# Patient Record
Sex: MALE | Race: WHITE | NOT HISPANIC OR LATINO | Employment: OTHER | URBAN - METROPOLITAN AREA
[De-identification: names, ages, dates, MRNs, and addresses within clinical notes are randomized per-mention and may not be internally consistent; named-entity substitution may affect disease eponyms.]

---

## 2018-11-13 ENCOUNTER — TELEPHONE (OUTPATIENT)
Dept: FAMILY MEDICINE | Facility: CLINIC | Age: 68
End: 2018-11-13

## 2018-11-13 ENCOUNTER — OFFICE VISIT (OUTPATIENT)
Dept: FAMILY MEDICINE | Facility: CLINIC | Age: 68
End: 2018-11-13
Payer: MEDICARE

## 2018-11-13 VITALS
HEART RATE: 88 BPM | DIASTOLIC BLOOD PRESSURE: 60 MMHG | WEIGHT: 232 LBS | RESPIRATION RATE: 18 BRPM | TEMPERATURE: 98 F | HEIGHT: 72 IN | BODY MASS INDEX: 31.42 KG/M2 | SYSTOLIC BLOOD PRESSURE: 130 MMHG

## 2018-11-13 DIAGNOSIS — F32.A DEPRESSION, UNSPECIFIED DEPRESSION TYPE: ICD-10-CM

## 2018-11-13 DIAGNOSIS — F41.9 ANXIETY: ICD-10-CM

## 2018-11-13 DIAGNOSIS — I10 ESSENTIAL HYPERTENSION: Primary | ICD-10-CM

## 2018-11-13 PROCEDURE — 99204 OFFICE O/P NEW MOD 45 MIN: CPT | Mod: S$GLB,,, | Performed by: NURSE PRACTITIONER

## 2018-11-13 RX ORDER — CLONAZEPAM 1 MG/1
1 TABLET ORAL 2 TIMES DAILY
COMMUNITY
End: 2018-11-13 | Stop reason: SDUPTHER

## 2018-11-13 RX ORDER — SERTRALINE HYDROCHLORIDE 50 MG/1
50 TABLET, FILM COATED ORAL DAILY
COMMUNITY
End: 2018-11-13 | Stop reason: SDUPTHER

## 2018-11-13 RX ORDER — SERTRALINE HYDROCHLORIDE 50 MG/1
50 TABLET, FILM COATED ORAL DAILY
Qty: 90 TABLET | Refills: 1 | Status: SHIPPED | OUTPATIENT
Start: 2018-11-13 | End: 2019-01-28

## 2018-11-13 RX ORDER — CARVEDILOL 3.12 MG/1
3.12 TABLET ORAL 2 TIMES DAILY WITH MEALS
COMMUNITY
End: 2018-12-06

## 2018-11-13 RX ORDER — LAMOTRIGINE 25 MG/1
25 TABLET ORAL 2 TIMES DAILY
COMMUNITY
End: 2020-02-19 | Stop reason: ALTCHOICE

## 2018-11-13 RX ORDER — CLONAZEPAM 1 MG/1
1 TABLET ORAL 2 TIMES DAILY
Qty: 30 TABLET | Refills: 0 | Status: SHIPPED | OUTPATIENT
Start: 2018-11-13 | End: 2019-01-28 | Stop reason: SDUPTHER

## 2018-11-13 RX ORDER — VALSARTAN 320 MG/1
320 TABLET ORAL DAILY
COMMUNITY
End: 2018-12-06

## 2018-11-13 NOTE — PROGRESS NOTES
Subjective:       Patient ID: Chris Quiroz is a 68 y.o. male.    Chief Complaint: Establish Care (Medication refills) and Hypertension    HPI New patient to me, here to establish care. States he is doing well. BP well controlled on current medications. He has history of depression and anxiety. Has been on his current medications with good control for a long time. He was living in [place] and has just moved back to the area. States he moved back because he was diagnosed with [place] allergy and was told that moving away from the area was the only way to avoid the symptoms. 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 It is common in [place] where her was living. Once he left the area all his respiratory symptoms have resolved. He states he had extensive work up done in [place] before he left. He had cardiac and pulmonary work up with labs and testing. We will get a copy of his records for review. He states he has had his colonoscopy, AAA screening and immunizations. Will review his records. He has no other concerns today. He is requesting refills. See ROS.    The following portion of the patients history was reviewed and updated as appropriate: allergies, current medications, past medical and surgical history. Past social history and problem list reviewed. Family PMH and Past social history reviewed. Tobacco, Illicit drug use reviewed.     Review of Systems   Constitutional: Negative for appetite change and fatigue.   HENT: Negative for congestion and sore throat.    Eyes: Negative for visual disturbance.   Respiratory: Negative for cough, chest tightness, shortness of breath and wheezing.    Cardiovascular: Negative for chest pain, palpitations and leg swelling.   Gastrointestinal: Negative for abdominal pain, blood in stool, diarrhea, nausea and vomiting.   Genitourinary: Negative for difficulty urinating.   Musculoskeletal: Negative for back pain and gait problem.   Neurological: Negative for headaches.    Psychiatric/Behavioral: Negative for decreased concentration, self-injury, sleep disturbance and suicidal ideas. The patient is not nervous/anxious.        Objective:     /60   Pulse 88   Temp 98.3 °F (36.8 °C) (Oral)   Resp 18   Ht 6' (1.829 m)   Wt 105.2 kg (232 lb)   BMI 31.46 kg/m²      Physical Exam  m   Constitutional: oriented to person, place, and time. well-developed and well-nourished.   HENT: normal nares, throat without erythema  Head: Normocephalic.   Eyes: Conjunctivae are normal. Pupils are equal, round, and reactive to light.   Neck: Normal range of motion. Neck supple. No tracheal deviation present. No thyromegaly present.   Cardiovascular: Normal rate, regular rhythm and normal heart sounds.    Pulmonary/Chest: Effort normal and breath sounds normal. No respiratory distress. No wheezes.   Abdominal: Soft. Bowel sounds are normal. No distension. There is no tenderness.   Musculoskeletal: Normal range of motion.gait and coordination normal   Neurological: oriented to person, place, and time.   Skin: Skin is warm and dry. No rashes or lesions  Psychiatric: Normal mood and affect.Behavior is normal. Judgment and thought content normal.   Assessment:       1. Essential hypertension    2. Depression, unspecified depression type    3. Anxiety        Plan:         Chris was seen today for establish care and hypertension.    Diagnoses and all orders for this visit:    Essential hypertension: BP well controlled. Continue current medications. Will give refills when he needs them.    Depression, unspecified depression type: continue the zoloft at current dose. I advised him that I cannot give him monthly klonopin. He will have to establish with Psychiatry for evaluation and treatment options. I will give him one refill to hold him until he can see them. I do not want him to go through withdrawals.   -     Ambulatory referral to Psychiatry    Anxiety: see above.    Other orders  -     sertraline  (ZOLOFT) 50 MG tablet; Take 1 tablet (50 mg total) by mouth once daily.  -     clonazePAM (KLONOPIN) 1 MG tablet; Take 1 tablet (1 mg total) by mouth 2 (two) times daily.  Do not take with ETOH, Sedatives or other narcotics. Do not take and drive due to potential for sedation. Do not take more than the prescribed amount.    Continue current medication  Take medications only as prescribed  Healthy diet, exercise  Adequate rest  Adequate hydration  Avoid allergens  Avoid excessive caffeine

## 2018-11-13 NOTE — PATIENT INSTRUCTIONS
[facility]  [phone]    [facility]  [address], [place] [address]  Phone: [phone]    [facility]  [address]  [place] [address]  Phone: [phone]  Fax: [phone]    [address] [place] [address]  Tel: [phone]   Fax: [phone]    [facility]  [address] [place] [address]  Telephone: [phone]    [facility] [address] [address]  Phone: [phone]  Fax: [phone]    [facility]  [address], [place] [address]  [phone]

## 2018-11-16 ENCOUNTER — PATIENT OUTREACH (OUTPATIENT)
Dept: ADMINISTRATIVE | Facility: HOSPITAL | Age: 68
End: 2018-11-16

## 2018-11-16 DIAGNOSIS — Z12.11 COLON CANCER SCREENING: ICD-10-CM

## 2018-11-16 NOTE — LETTER
November 16, 2018    Chris Quiroz  [address] [facility] [place] [address]             [facility]  [address]  [facility] [address]  Phone: [phone] Dear Mr. Quiroz:  Our records show you are due for colon cancer screening.  If you have already had your screening, or you have made an appointment for your screening, congratulations!  Youre on the road to good health. If you havent signed up for a colorectal screening please accept this invitation to be screened.      According to the American Cancer Society, colon cancer is the third most common cancer for people in the [place].  A simple screening test Fit Kit - done in your own home - can help find colon cancer at an early stage when it can be treated, even before any signs or symptoms develop.     Testing for blood in your stool (feces or bowel movement) is the first step. If you have an upcoming visit with your Primary Care Physician you can  a Fit Kit during your visit, or you can  a Fit Kit at your Primary Care Clinic prior to your visit.    If your test results are negative, you wont need testing again for another year.  If results show you need more testing, we will call you with next steps.    Regular colorectal cancer screening is one of the most powerful weapons for preventing coloncancer.    I wish you continued good health!    Sincerely,    Your [facility]

## 2018-12-06 PROBLEM — K92.2 GASTROINTESTINAL BLEEDING: Status: ACTIVE | Noted: 2018-12-06

## 2018-12-06 PROBLEM — J18.9 COMMUNITY ACQUIRED PNEUMONIA OF RIGHT LOWER LOBE OF LUNG: Status: ACTIVE | Noted: 2018-12-06

## 2018-12-06 PROBLEM — K21.9 GERD (GASTROESOPHAGEAL REFLUX DISEASE): Status: ACTIVE | Noted: 2018-12-06

## 2018-12-06 PROBLEM — K92.2 ACUTE UPPER GI BLEED: Status: ACTIVE | Noted: 2018-12-06

## 2018-12-13 ENCOUNTER — OFFICE VISIT (OUTPATIENT)
Dept: FAMILY MEDICINE | Facility: CLINIC | Age: 68
End: 2018-12-13
Payer: MEDICARE

## 2018-12-13 ENCOUNTER — HOSPITAL ENCOUNTER (OUTPATIENT)
Dept: RADIOLOGY | Facility: HOSPITAL | Age: 68
Discharge: HOME | End: 2018-12-13
Attending: NURSE PRACTITIONER
Payer: MEDICARE

## 2018-12-13 VITALS
SYSTOLIC BLOOD PRESSURE: 100 MMHG | WEIGHT: 230 LBS | DIASTOLIC BLOOD PRESSURE: 54 MMHG | TEMPERATURE: 98 F | HEART RATE: 88 BPM | BODY MASS INDEX: 31.15 KG/M2 | RESPIRATION RATE: 20 BRPM | HEIGHT: 72 IN

## 2018-12-13 DIAGNOSIS — J18.9 PNEUMONIA OF RIGHT LOWER LOBE DUE TO INFECTIOUS ORGANISM: ICD-10-CM

## 2018-12-13 DIAGNOSIS — Z09 HOSPITAL DISCHARGE FOLLOW-UP: Primary | ICD-10-CM

## 2018-12-13 DIAGNOSIS — K26.4 GASTROINTESTINAL HEMORRHAGE ASSOCIATED WITH DUODENAL ULCER: ICD-10-CM

## 2018-12-13 PROCEDURE — 99214 OFFICE O/P EST MOD 30 MIN: CPT | Mod: S$GLB,,, | Performed by: NURSE PRACTITIONER

## 2018-12-13 PROCEDURE — 71046 X-RAY EXAM CHEST 2 VIEWS: CPT | Mod: 26,,, | Performed by: RADIOLOGY

## 2018-12-13 PROCEDURE — 71046 X-RAY EXAM CHEST 2 VIEWS: CPT | Mod: TC,FY,PO

## 2018-12-13 RX ORDER — ACETAMINOPHEN 500 MG
500 TABLET ORAL EVERY 6 HOURS PRN
Status: ON HOLD | COMMUNITY
End: 2021-03-01 | Stop reason: HOSPADM

## 2018-12-13 RX ORDER — LEVOFLOXACIN 500 MG/1
500 TABLET, FILM COATED ORAL DAILY
Qty: 10 TABLET | Refills: 0 | Status: SHIPPED | OUTPATIENT
Start: 2018-12-13 | End: 2019-01-28 | Stop reason: ALTCHOICE

## 2018-12-13 RX ORDER — GLUCOSAMINE/CHONDRO SU A 500-400 MG
1 TABLET ORAL
Status: ON HOLD | COMMUNITY
End: 2023-01-01 | Stop reason: HOSPADM

## 2018-12-14 NOTE — PROGRESS NOTES
Subjective:       Patient ID: Chris Quiroz is a 68 y.o. male.    Chief Complaint: Hospital Follow Up ([facility]: Bleeding Ulcer and Pneumoina  12/6)    HPI here for hospital follow up. He was admitted for GI bleed and pneumonia. He was admitted on 12/6 and discharged on 12/8. States he had large black tarry stool during the night so went to the ER> had GI bleed about 20 years ago. States after the BM he was really dizzy and SOB. He was found on Xray to have pneumonia. He had EGD done in the hospital and was found to have gastritis, multiple gastric and duodenal ulcers. No active bleed was found. Upon discharge his H&H was 8.7 and 26.6.  He did not receive any blood transfusions. He was given IV antibiotics and send home with 3 days of levaquin. States he is still feeling some SOB and weakness. States he is better, but not back to base line yet.  He states he is eating and drinking well. He denies any fever. Has cough but that has improved. He has no other concerns today. Hospital records reviewed. See ROS.    The following portion of the patients history was reviewed and updated as appropriate: allergies, current medications, past medical and surgical history. Past social history and problem list reviewed. Family PMH and Past social history reviewed. Tobacco, Illicit drug use reviewed.     Review of Systems   Constitutional: Positive for fatigue. Negative for fever.   HENT: Negative for congestion and sore throat.    Eyes: Negative for visual disturbance.   Respiratory: Positive for cough (dry) and shortness of breath. Negative for wheezing.    Cardiovascular: Negative for chest pain, palpitations and leg swelling.   Gastrointestinal: Negative for abdominal pain, blood in stool, diarrhea, nausea and vomiting.   Musculoskeletal: Negative for back pain and gait problem.   Skin:        Pale     Neurological: Positive for weakness. Negative for dizziness and headaches.       Objective:     BP (!) 100/54   Pulse 88    Temp 98.2 °F (36.8 °C) (Oral)   Resp 20   Ht 6' (1.829 m)   Wt 104.3 kg (230 lb)   BMI 31.19 kg/m²      Physical Exam   Constitutional: oriented to person, place, and time. well-developed and well-nourished.   HENT: normal nares. Throat clear.  Head: Normocephalic.   Eyes: Conjunctivae are normal. Pupils are equal, round, and reactive to light.   Neck: Normal range of motion. Neck supple. No tracheal deviation present. No thyromegaly present.   Cardiovascular: Normal rate, regular rhythm and normal heart sounds.    Pulmonary/Chest: Effort normal   No distress. Still with some rales in the lung bases. No wheezing.   Abdominal: Soft. Bowel sounds are normal. No distension. There is no tenderness.   Musculoskeletal: Normal range of motion. Gait slow, steady.   Neurological: oriented to person, place, and time.   Skin: Skin is warm and dry. No rashes or lesions. pale  Psychiatric: Normal mood and affect.Behavior is normal. Judgment and thought content normal.   Assessment:       1. Hospital discharge follow-up    2. Pneumonia of right lower lobe due to infectious organism    3. Gastrointestinal hemorrhage associated with duodenal ulcer        Plan:         Chris was seen today for hospital follow up.    Diagnoses and all orders for this visit:    Hospital discharge follow-up: hospital records reviewed.    Pneumonia of right lower lobe due to infectious organism: repeat CXR: mild decrease in bilateral infiltrates. Will continue his antibiotics for a little longer.     -     X-Ray Chest PA And Lateral; Future    Gastrointestinal hemorrhage associated with duodenal ulcer: on medication as follow up with GI doctor. No more blood in stools. He has follow up labs scheduled per the GI doctor.     Other orders  -     levoFLOXacin (LEVAQUIN) 500 MG tablet; Take 1 tablet (500 mg total) by mouth once daily.    Continue current medication  Take medications only as prescribed  Healthy diet, exercise  Adequate rest  Adequate  hydration  Avoid allergens  Avoid excessive caffeine

## 2019-01-15 ENCOUNTER — PATIENT OUTREACH (OUTPATIENT)
Dept: ADMINISTRATIVE | Facility: HOSPITAL | Age: 69
End: 2019-01-15

## 2019-01-28 ENCOUNTER — OFFICE VISIT (OUTPATIENT)
Dept: FAMILY MEDICINE | Facility: CLINIC | Age: 69
End: 2019-01-28
Payer: MEDICARE

## 2019-01-28 VITALS
DIASTOLIC BLOOD PRESSURE: 66 MMHG | WEIGHT: 230.63 LBS | TEMPERATURE: 99 F | RESPIRATION RATE: 18 BRPM | BODY MASS INDEX: 31.24 KG/M2 | HEIGHT: 72 IN | SYSTOLIC BLOOD PRESSURE: 144 MMHG | HEART RATE: 88 BPM

## 2019-01-28 DIAGNOSIS — K26.4 GASTROINTESTINAL HEMORRHAGE ASSOCIATED WITH DUODENAL ULCER: ICD-10-CM

## 2019-01-28 DIAGNOSIS — I10 ESSENTIAL HYPERTENSION: ICD-10-CM

## 2019-01-28 DIAGNOSIS — H91.93 DECREASED HEARING OF BOTH EARS: ICD-10-CM

## 2019-01-28 DIAGNOSIS — F41.9 ANXIETY: Primary | ICD-10-CM

## 2019-01-28 DIAGNOSIS — F32.A DEPRESSION, UNSPECIFIED DEPRESSION TYPE: ICD-10-CM

## 2019-01-28 DIAGNOSIS — Z79.899 ENCOUNTER FOR LONG-TERM CURRENT USE OF MEDICATION: ICD-10-CM

## 2019-01-28 PROCEDURE — 99214 PR OFFICE/OUTPT VISIT, EST, LEVL IV, 30-39 MIN: ICD-10-PCS | Mod: S$GLB,,, | Performed by: NURSE PRACTITIONER

## 2019-01-28 PROCEDURE — 99214 OFFICE O/P EST MOD 30 MIN: CPT | Mod: S$GLB,,, | Performed by: NURSE PRACTITIONER

## 2019-01-28 RX ORDER — CLONAZEPAM 1 MG/1
1 TABLET ORAL 2 TIMES DAILY
Qty: 60 TABLET | Refills: 0 | Status: SHIPPED | OUTPATIENT
Start: 2019-01-28 | End: 2019-07-21 | Stop reason: SDUPTHER

## 2019-01-28 RX ORDER — SERTRALINE HYDROCHLORIDE 100 MG/1
100 TABLET, FILM COATED ORAL DAILY
Qty: 90 TABLET | Refills: 1 | Status: SHIPPED | OUTPATIENT
Start: 2019-01-28 | End: 2019-07-22 | Stop reason: SDUPTHER

## 2019-01-30 ENCOUNTER — LAB VISIT (OUTPATIENT)
Dept: LAB | Facility: HOSPITAL | Age: 69
End: 2019-01-30
Attending: NURSE PRACTITIONER
Payer: MEDICARE

## 2019-01-30 DIAGNOSIS — Z79.899 ENCOUNTER FOR LONG-TERM CURRENT USE OF MEDICATION: ICD-10-CM

## 2019-01-30 LAB
ALBUMIN SERPL BCP-MCNC: 3.4 G/DL
ALP SERPL-CCNC: 62 U/L
ALT SERPL W/O P-5'-P-CCNC: 11 U/L
ANION GAP SERPL CALC-SCNC: 12 MMOL/L
AST SERPL-CCNC: 25 U/L
BASOPHILS # BLD AUTO: 0.04 K/UL
BASOPHILS NFR BLD: 0.6 %
BILIRUB SERPL-MCNC: 1.1 MG/DL
BUN SERPL-MCNC: 17 MG/DL
CALCIUM SERPL-MCNC: 8.8 MG/DL
CHLORIDE SERPL-SCNC: 102 MMOL/L
CHOLEST SERPL-MCNC: 198 MG/DL
CHOLEST/HDLC SERPL: 4 {RATIO}
CO2 SERPL-SCNC: 25 MMOL/L
CREAT SERPL-MCNC: 1.1 MG/DL
DIFFERENTIAL METHOD: ABNORMAL
EOSINOPHIL # BLD AUTO: 0 K/UL
EOSINOPHIL NFR BLD: 0.4 %
ERYTHROCYTE [DISTWIDTH] IN BLOOD BY AUTOMATED COUNT: 15.7 %
EST. GFR  (AFRICAN AMERICAN): >60 ML/MIN/1.73 M^2
EST. GFR  (NON AFRICAN AMERICAN): >60 ML/MIN/1.73 M^2
GLUCOSE SERPL-MCNC: 117 MG/DL
HCT VFR BLD AUTO: 37.5 %
HDLC SERPL-MCNC: 49 MG/DL
HDLC SERPL: 24.7 %
HGB BLD-MCNC: 11.4 G/DL
IMM GRANULOCYTES # BLD AUTO: 0.03 K/UL
IMM GRANULOCYTES NFR BLD AUTO: 0.4 %
LDLC SERPL CALC-MCNC: 120.2 MG/DL
LYMPHOCYTES # BLD AUTO: 1.1 K/UL
LYMPHOCYTES NFR BLD: 15.7 %
MCH RBC QN AUTO: 25.8 PG
MCHC RBC AUTO-ENTMCNC: 30.4 G/DL
MCV RBC AUTO: 85 FL
MONOCYTES # BLD AUTO: 0.5 K/UL
MONOCYTES NFR BLD: 7.7 %
NEUTROPHILS # BLD AUTO: 5.1 K/UL
NEUTROPHILS NFR BLD: 75.2 %
NONHDLC SERPL-MCNC: 149 MG/DL
NRBC BLD-RTO: 0 /100 WBC
PLATELET # BLD AUTO: 171 K/UL
PMV BLD AUTO: 11.9 FL
POTASSIUM SERPL-SCNC: 3.7 MMOL/L
PROT SERPL-MCNC: 7 G/DL
RBC # BLD AUTO: 4.42 M/UL
SODIUM SERPL-SCNC: 139 MMOL/L
TRIGL SERPL-MCNC: 144 MG/DL
TSH SERPL DL<=0.005 MIU/L-ACNC: 1.21 UIU/ML
WBC # BLD AUTO: 6.77 K/UL

## 2019-01-30 PROCEDURE — 80053 COMPREHEN METABOLIC PANEL: CPT

## 2019-01-30 PROCEDURE — 80061 LIPID PANEL: CPT

## 2019-01-30 PROCEDURE — 86803 HEPATITIS C AB TEST: CPT

## 2019-01-30 PROCEDURE — 85025 COMPLETE CBC W/AUTO DIFF WBC: CPT

## 2019-01-30 PROCEDURE — 36415 COLL VENOUS BLD VENIPUNCTURE: CPT | Mod: PO

## 2019-01-30 PROCEDURE — 84443 ASSAY THYROID STIM HORMONE: CPT

## 2019-01-31 LAB — HCV AB SERPL QL IA: NEGATIVE

## 2019-02-01 NOTE — PROGRESS NOTES
Subjective:       Patient ID: Chris Quiroz is a 68 y.o. male.    Chief Complaint: Bleeding Ulcer (Follow up )    HPI here for follow up on anxiety, GI bleed. His stomach is feeling better since being on medication. He denies any black tarry stools.  He is due for labs. He feels he is stable on his zoloft. He takes the klonopin when he feels panicked which is only prn. He feels that his depression and anxiety are doing well on the zoloft and the lamictal. He denies thoughts of wanting to harm himself or others. See ROS.    The following portion of the patients history was reviewed and updated as appropriate: allergies, current medications, past medical and surgical history. Past social history and problem list reviewed. Family PMH and Past social history reviewed. Tobacco, Illicit drug use reviewed.     Review of Systems  Constitutional: No fatigue or fever    Respiratory:   No SOB, Wheezing, cough  Cardiovascular:   No CP, Palpitations  Gastrointestinal:   No N/V/D. No abdominal pain, weight stable. Appetite good.   Genitourinary:   No dysuria, urgency or frequency. No change in bowels. No blood in stools.   Musculoskeletal:   No joint pain  No change in gait or coordination. .  Neurological:   No dizziness. No headaches  Hematological: No abnormal bruising or bleeding    Psychiatric/Behavioral Negative for suicidal ideas.  Denies feelings of depression. No thoughts of wanting to harm self or others. anxious, worried all the time. Feels panicked at times, especially in social situations.     Objective:     BP (!) 144/66   Pulse 88   Temp 99 °F (37.2 °C) (Oral)   Resp 18   Ht 6' (1.829 m)   Wt 104.6 kg (230 lb 9.6 oz)   BMI 31.27 kg/m²      Physical Exam     Constitutional: oriented to person, place, and time. well-developed and well-nourished.   Neck: Normal range of motion. Neck supple. No tracheal deviation present. No thyromegaly present.   Cardiovascular: Normal rate, regular rhythm and normal  heart sounds.    Pulmonary/Chest: Effort normal and breath sounds normal. No respiratory distress. No wheezes.   Abdominal: Soft. Bowel sounds are normal. No distension. There is no tenderness.   Musculoskeletal: Normal range of motion. Gait and coordination normal. .   Neurological: oriented to person, place, and time.   Skin: Skin is warm and dry. No rashes or lesions  Psychiatric: Normal mood and affect.Behavior is normal. Judgment and thought content normal. he does not present as a threat to himself or others.   Assessment:       1. Anxiety    2. Encounter for long-term current use of medication    3. Decreased hearing of both ears    4. Gastrointestinal hemorrhage associated with duodenal ulcer    5. Essential hypertension    6. Depression, unspecified depression type        Plan:         Chris was seen today for bleeding ulcer.    Diagnoses and all orders for this visit:    Anxiety Continue the zoloft at 100mg, kloopin on PRN basis and the Lamictal at 25mg BID.     Encounter for long-term current use of medication  -     Lipid panel; Future  -     Hepatitis C antibody; Future  -     Comprehensive metabolic panel; Future  -     CBC auto differential; Future  -     TSH; Future    Decreased hearing of both ears: will send to Audiology for evaluation.   -     Ambulatory consult to Audiology    Gastrointestinal hemorrhage associated with duodenal ulcer: continue the protonix as prescribed.     Essential hypertension: he is not taking any BP medication at this time. Will monitor closely.     Depression, unspecified depression type: continue the zoloft.     Other orders  -     sertraline (ZOLOFT) 100 MG tablet; Take 1 tablet (100 mg total) by mouth once daily.  -     clonazePAM (KLONOPIN) 1 MG tablet; Take 1 tablet (1 mg total) by mouth 2 (two) times daily.  Do not take with ETOH, Sedatives or other narcotics. Do not take and drive due to potential for sedation. Do not take more than the prescribed  amount.    Continue current medication  Take medications only as prescribed  Healthy diet, exercise  Adequate rest  Adequate hydration  Avoid allergens  Avoid excessive caffeine

## 2019-04-02 ENCOUNTER — OFFICE VISIT (OUTPATIENT)
Dept: FAMILY MEDICINE | Facility: CLINIC | Age: 69
End: 2019-04-02
Payer: MEDICARE

## 2019-04-02 ENCOUNTER — HOSPITAL ENCOUNTER (OUTPATIENT)
Dept: RADIOLOGY | Facility: HOSPITAL | Age: 69
Discharge: HOME | End: 2019-04-02
Attending: NURSE PRACTITIONER
Payer: MEDICARE

## 2019-04-02 VITALS
OXYGEN SATURATION: 96 % | HEIGHT: 72 IN | TEMPERATURE: 98 F | SYSTOLIC BLOOD PRESSURE: 130 MMHG | RESPIRATION RATE: 18 BRPM | HEART RATE: 70 BPM | WEIGHT: 219.38 LBS | BODY MASS INDEX: 29.71 KG/M2 | DIASTOLIC BLOOD PRESSURE: 66 MMHG

## 2019-04-02 DIAGNOSIS — G89.29 CHRONIC RIGHT-SIDED LOW BACK PAIN WITH RIGHT-SIDED SCIATICA: ICD-10-CM

## 2019-04-02 DIAGNOSIS — Z13.6 SCREENING FOR AAA (ABDOMINAL AORTIC ANEURYSM): ICD-10-CM

## 2019-04-02 DIAGNOSIS — Z23 IMMUNIZATION DUE: ICD-10-CM

## 2019-04-02 DIAGNOSIS — M72.2 PLANTAR FASCIITIS OF RIGHT FOOT: ICD-10-CM

## 2019-04-02 DIAGNOSIS — M79.671 PAIN OF RIGHT HEEL: Primary | ICD-10-CM

## 2019-04-02 DIAGNOSIS — M79.671 PAIN OF RIGHT HEEL: ICD-10-CM

## 2019-04-02 DIAGNOSIS — M54.41 CHRONIC RIGHT-SIDED LOW BACK PAIN WITH RIGHT-SIDED SCIATICA: ICD-10-CM

## 2019-04-02 PROCEDURE — 72110 X-RAY EXAM L-2 SPINE 4/>VWS: CPT | Mod: TC,FY,PO

## 2019-04-02 PROCEDURE — 90670 PCV13 VACCINE IM: CPT | Mod: S$GLB,,, | Performed by: NURSE PRACTITIONER

## 2019-04-02 PROCEDURE — 73650 X-RAY EXAM OF HEEL: CPT | Mod: TC,FY,PO,RT

## 2019-04-02 PROCEDURE — G0009 ADMIN PNEUMOCOCCAL VACCINE: HCPCS | Mod: S$GLB,,, | Performed by: NURSE PRACTITIONER

## 2019-04-02 PROCEDURE — 72110 XR LUMBAR SPINE 5 VIEW WITH FLEX AND EXT: ICD-10-PCS | Mod: 26,,, | Performed by: RADIOLOGY

## 2019-04-02 PROCEDURE — 72110 X-RAY EXAM L-2 SPINE 4/>VWS: CPT | Mod: 26,,, | Performed by: RADIOLOGY

## 2019-04-02 PROCEDURE — 99214 OFFICE O/P EST MOD 30 MIN: CPT | Mod: 25,S$GLB,, | Performed by: NURSE PRACTITIONER

## 2019-04-02 PROCEDURE — 73650 XR CALCANEUS 2 VIEW RIGHT: ICD-10-PCS | Mod: 26,RT,, | Performed by: RADIOLOGY

## 2019-04-02 PROCEDURE — 90670 PNEUMOCOCCAL CONJUGATE VACCINE 13-VALENT LESS THAN 5YO & GREATER THAN: ICD-10-PCS | Mod: S$GLB,,, | Performed by: NURSE PRACTITIONER

## 2019-04-02 PROCEDURE — G0009 PNEUMOCOCCAL CONJUGATE VACCINE 13-VALENT LESS THAN 5YO & GREATER THAN: ICD-10-PCS | Mod: S$GLB,,, | Performed by: NURSE PRACTITIONER

## 2019-04-02 PROCEDURE — 99214 PR OFFICE/OUTPT VISIT, EST, LEVL IV, 30-39 MIN: ICD-10-PCS | Mod: 25,S$GLB,, | Performed by: NURSE PRACTITIONER

## 2019-04-02 PROCEDURE — 73650 X-RAY EXAM OF HEEL: CPT | Mod: 26,RT,, | Performed by: RADIOLOGY

## 2019-04-02 NOTE — PROGRESS NOTES
Subjective:       Patient ID: Chris Quiroz is a 68 y.o. male.    Chief Complaint: Leg Pain (Rt leg pain, On and off since September, constant for about one month)    HPI here with concerns regarding right leg pain, off and on since September but has gotten more constant over the past month. Pain goes from back of right heel up to buttocks. Hurts more when driving. If he walks too long it will hurt. States right leg goes out from under him. Applies ice twice a day and that helps. Taking tylenol and that helps. No recent injuries. He wants to go see physical therapy. Feels like a pulled muscle but goes all the way down the leg. Sharp and burning at times. He does not think that he is limping. He feels it sometimes goes from the lower back down the leg and sometimes from the heel upwards. See ROS.    The following portion of the patients history was reviewed and updated as appropriate: allergies, current medications, past medical and surgical history. Past social history and problem list reviewed. Family PMH and Past social history reviewed. Tobacco, Illicit drug use reviewed.     Review of Systems   Constitutional: Negative for activity change, appetite change, chills, fatigue, fever and unexpected weight change.   HENT: Negative for congestion, hearing loss, mouth sores, sneezing, trouble swallowing and voice change.    Eyes: Negative for redness and visual disturbance.   Respiratory: Negative for cough, choking, chest tightness, shortness of breath and wheezing.    Cardiovascular: Negative for chest pain, palpitations and leg swelling.   Gastrointestinal: Negative for abdominal distention, abdominal pain, blood in stool, constipation, diarrhea, nausea and vomiting.   Endocrine: Negative for cold intolerance, heat intolerance, polydipsia, polyphagia and polyuria.   Genitourinary: Negative for decreased urine volume, difficulty urinating, flank pain, frequency and urgency.   Musculoskeletal: Positive for  arthralgias, back pain and gait problem.        See HPI   Skin: Negative for pallor, rash and wound.   Allergic/Immunologic: Negative for environmental allergies and food allergies.   Neurological: Negative for dizziness, tremors, seizures, speech difficulty, weakness and headaches.   Hematological: Negative for adenopathy. Does not bruise/bleed easily.   Psychiatric/Behavioral: Negative for agitation, behavioral problems, confusion, decreased concentration, dysphoric mood, self-injury, sleep disturbance and suicidal ideas. The patient is not nervous/anxious.        Objective:     /66   Pulse 70   Temp 98 °F (36.7 °C) (Oral)   Resp 18   Ht 6' (1.829 m)   Wt 99.5 kg (219 lb 6.4 oz)   SpO2 96%   BMI 29.76 kg/m²      Physical Exam   Constitutional: He is oriented to person, place, and time. He appears well-developed and well-nourished. No distress.   HENT:   Mouth/Throat: No oropharyngeal exudate.   Eyes: Pupils are equal, round, and reactive to light. Conjunctivae are normal. Right eye exhibits no discharge. Left eye exhibits no discharge.   Neck: Normal range of motion. Neck supple. No JVD present. No thyromegaly present.   Cardiovascular: Normal rate, regular rhythm and normal heart sounds. Exam reveals no gallop.   No murmur heard.  Pulmonary/Chest: Effort normal and breath sounds normal. No respiratory distress. He has no wheezes. He has no rales. He exhibits no tenderness.   Abdominal: Soft. Bowel sounds are normal. He exhibits no distension. There is no tenderness. There is no rebound and no guarding.   Musculoskeletal: Normal range of motion. He exhibits no edema.        Back:         Right foot: There is tenderness and bony tenderness.   Lymphadenopathy:     He has no cervical adenopathy.   Neurological: He is alert and oriented to person, place, and time.   Skin: Skin is warm and dry. Capillary refill takes less than 2 seconds. No rash noted. He is not diaphoretic.   Psychiatric: He has a normal  mood and affect. His behavior is normal. Judgment and thought content normal.   Nursing note and vitals reviewed.      Assessment:       1. Pain of right heel    2. Chronic right-sided low back pain with right-sided sciatica    3. Plantar fasciitis of right foot    4. Immunization due    5. Screening for AAA (abdominal aortic aneurysm)        Plan:         Chris was seen today for leg pain.    Diagnoses and all orders for this visit:    Pain of right heel: will get xray and possibly refer to Podiatry  -     X-Ray Calcaneus 2 View Right; Future  -     Ambulatory consult to Physical Therapy    Chronic right-sided low back pain with right-sided sciatica: will get xray and refer to physical therapy.   -     X-Ray Lumbar Complete With Flex And Ext; Future  -     Ambulatory consult to Physical Therapy    Plantar fasciitis of right foot: get xray. Exercises discussed. Podiatry referral.     Immunization due  -     Pneumococcal Conjugate Vaccine (13 Valent) (IM)    Screening for AAA (abdominal aortic aneurysm): due for screening  -     -     Abdominal Aorta Evaluation (Cupid Only); Future    Continue current medication  Take medications only as prescribed  Healthy diet, exercise  Adequate rest  Adequate hydration  Avoid allergens  Avoid excessive caffeine

## 2019-04-03 DIAGNOSIS — G89.29 CHRONIC RIGHT-SIDED LOW BACK PAIN WITH RIGHT-SIDED SCIATICA: ICD-10-CM

## 2019-04-03 DIAGNOSIS — M54.41 CHRONIC RIGHT-SIDED LOW BACK PAIN WITH RIGHT-SIDED SCIATICA: ICD-10-CM

## 2019-04-03 DIAGNOSIS — M51.36 DDD (DEGENERATIVE DISC DISEASE), LUMBAR: ICD-10-CM

## 2019-04-03 DIAGNOSIS — M72.2 PLANTAR FASCIITIS, RIGHT: Primary | ICD-10-CM

## 2019-04-04 ENCOUNTER — OFFICE VISIT (OUTPATIENT)
Dept: PODIATRY | Facility: CLINIC | Age: 69
End: 2019-04-04
Payer: MEDICARE

## 2019-04-04 ENCOUNTER — CLINICAL SUPPORT (OUTPATIENT)
Dept: CARDIOLOGY | Facility: CLINIC | Age: 69
End: 2019-04-04
Attending: NURSE PRACTITIONER
Payer: MEDICARE

## 2019-04-04 VITALS
RESPIRATION RATE: 13 BRPM | SYSTOLIC BLOOD PRESSURE: 116 MMHG | DIASTOLIC BLOOD PRESSURE: 68 MMHG | HEIGHT: 72 IN | WEIGHT: 221.56 LBS | HEART RATE: 70 BPM | BODY MASS INDEX: 30.01 KG/M2

## 2019-04-04 DIAGNOSIS — M72.2 PLANTAR FASCIITIS: Primary | ICD-10-CM

## 2019-04-04 DIAGNOSIS — Q66.71 PES CAVUS OF RIGHT FOOT: ICD-10-CM

## 2019-04-04 DIAGNOSIS — M79.671 PAIN IN RIGHT FOOT: ICD-10-CM

## 2019-04-04 DIAGNOSIS — M77.31 CALCANEAL SPUR OF FOOT, RIGHT: ICD-10-CM

## 2019-04-04 DIAGNOSIS — M24.571 EQUINUS CONTRACTURE OF RIGHT ANKLE: ICD-10-CM

## 2019-04-04 DIAGNOSIS — Z13.6 SCREENING FOR AAA (ABDOMINAL AORTIC ANEURYSM): ICD-10-CM

## 2019-04-04 PROCEDURE — 93978 CV US ABDOMINAL AORTA EVALUATION (CUPID ONLY): ICD-10-PCS | Mod: 26,S$PBB,, | Performed by: INTERNAL MEDICINE

## 2019-04-04 PROCEDURE — 99999 PR PBB SHADOW E&M-EST. PATIENT-LVL IV: CPT | Mod: PBBFAC,,, | Performed by: PODIATRIST

## 2019-04-04 PROCEDURE — 99214 OFFICE O/P EST MOD 30 MIN: CPT | Mod: PBBFAC,PN,25 | Performed by: PODIATRIST

## 2019-04-04 PROCEDURE — 99999 PR PBB SHADOW E&M-EST. PATIENT-LVL IV: ICD-10-PCS | Mod: PBBFAC,,, | Performed by: PODIATRIST

## 2019-04-04 PROCEDURE — 99213 PR OFFICE/OUTPT VISIT, EST, LEVL III, 20-29 MIN: ICD-10-PCS | Mod: S$PBB,,, | Performed by: PODIATRIST

## 2019-04-04 PROCEDURE — 99213 OFFICE O/P EST LOW 20 MIN: CPT | Mod: S$PBB,,, | Performed by: PODIATRIST

## 2019-04-04 PROCEDURE — 93978 VASCULAR STUDY: CPT | Mod: PBBFAC,PO | Performed by: INTERNAL MEDICINE

## 2019-04-04 RX ORDER — SULFAMETHOXAZOLE AND TRIMETHOPRIM 800; 160 MG/1; MG/1
1 TABLET ORAL 2 TIMES DAILY
Qty: 28 TABLET | Refills: 0 | Status: SHIPPED | OUTPATIENT
Start: 2019-04-04 | End: 2019-04-04

## 2019-04-04 NOTE — PATIENT INSTRUCTIONS
- Wear supportive shoes such as sneakers with arch supports.  Look for New Balance or Mcelroy sneakers.    - Look for Superfeet or Powerstep arch supports.    - Rest/reduce ambulation to necessary activities of daily living.    - Ice heel for no more than 20 minutes/per hour.    - Elevate foot as much as possible throughout the day.    - Perform Achilles/plantar fascia stretches as much as possible throughout the day.    - Apply OTC topical arnica to affected area for pain relief and to reduce bruising/swelling.    - Notify clinic if pain worsens or fails to improve.    - Follow up in 2 weeks for heel pain.

## 2019-04-04 NOTE — LETTER
April 6, 2019      Neeru Bolivar, IGNACIO  [address] [address] [address]           [facility] [address] [facility] [address] [place] [address]  Phone: [phone]          Patient: Chris Quiroz   MR Number: [id number]   YOB: 1950   Date of Visit: 4/4/2019       Dear Neeru Bolivar:    Thank you for referring Chris Quiroz to me for evaluation. Attached you will find relevant portions of my assessment and plan of care.    If you have questions, please do not hesitate to call me. I look forward to following Chris Quiroz along with you.    Sincerely,    Yuliya Wilkins, NILE    Enclosure  CC:  No Recipients    If you would like to receive this communication electronically, please contact [email or web address] or [phone] to request more information on [employer] access.    For providers and/or their staff who would like to refer a patient to [facility], please contact us through our one-stop-shop provider referral line, [facility] [employer], at [phone].    If you feel you have received this communication in error or would no longer like to receive these types of communications, please e-mail [email or web address]

## 2019-04-06 PROBLEM — M24.571 EQUINUS CONTRACTURE OF RIGHT ANKLE: Status: ACTIVE | Noted: 2019-04-06

## 2019-04-06 PROBLEM — M72.2 PLANTAR FASCIITIS: Status: ACTIVE | Noted: 2019-04-06

## 2019-04-06 PROBLEM — M77.31 CALCANEAL SPUR OF FOOT, RIGHT: Status: ACTIVE | Noted: 2019-04-06

## 2019-04-06 PROBLEM — M79.671 PAIN IN RIGHT FOOT: Status: ACTIVE | Noted: 2019-04-06

## 2019-04-06 PROBLEM — Q66.71 PES CAVUS OF RIGHT FOOT: Status: ACTIVE | Noted: 2019-04-06

## 2019-04-06 NOTE — PROGRESS NOTES
Subjective:      Patient ID: Chris Quiroz is a 68 y.o. male.    Chief Complaint: Heel Pain (goes up to right hip, radiates up the leg )      HPI:  Chris is a 68 y.o. male who presents to the clinic complaining of heel pain in the right foot, especially with the first step in the morning. The pain is described as Aching, Throbbing, Grabbing, Tight, Sharp and Shooting. The onset of the pain was gradual and has worsened over the past several weeks. Chris rates the pain as 8/10. He denies a history of trauma. Prior treatments include rest.  He does report right hip and low back pain intermittently.  Patient denies any other pedal complaints at this time.    PCP: Neeru Bolivar NP  Date last seen:  4/2/19    Review of Systems   Constitutional: Negative for appetite change, fever, chills, fatigue and unexpected weight change.   Respiratory: Negative for cough, wheezing, and shortness of breath.   Cardiovascular: Negative for chest pain, claudication, cyanosis, and leg swelling.  Endocrine:  Negative for intolerance to cold, intolerance to heat, polydipsia, polyphagia, and polyuria.    Gastrointestinal: Negative for nausea, vomiting, diarrhea, and constipation.   Musculoskeletal: Positive for back pain, arthritis, joint pain, joint swelling, myalgias, and stiffness.   Skin: Negative for nail bed changes, discoloration, rash, itching, poor wound healing, suspicious lesion, and unusual hair distribution.   Neurological: Negative for loss of balance, sensory change, paresthesias, and numbness. Positive for pain.  Hematological: Negative for adenopathy, bleeding, and bruising easily.   Psychiatric/Behavioral: The patient is not nervous/anxious.  Negative for altered mental status.    No results found for: HGBA1C    Past Medical History:   Diagnosis Date    Anxiety     Atherosclerosis of coronary artery     per CT scan dated 7/13/18    Depression     Diverticulosis of intestine without bleeding     GERD  (gastroesophageal reflux disease)     Hyperlipidemia     Hypertension     Thoracic aorta atherosclerosis     per CT dated 7/13/18. sent for scanning    Vitamin D deficiency      Past Surgical History:   Procedure Laterality Date    COLONOSCOPY      EGD (ESOPHAGOGASTRODUODENOSCOPY) Left 12/7/2018    Performed by Josiah Cuadra [facility] at [facility]     Family History   Problem Relation Age of Onset    Diabetes Mother     Depression Mother     Alcohol abuse Father     Cancer Father     Depression Father     Diabetes Sister     Cancer Sister     Cancer Brother     Mental illness Sister     Cancer Sister     Arthritis Sister     Cancer Sister      Social History     Socioeconomic History    Marital status:      Spouse name: Not on file    Number of children: Not on file    Years of education: Not on file    Highest education level: Not on file   Occupational History    Not on file   Social Needs    Financial resource strain: Not on file    Food insecurity:     Worry: Not on file     Inability: Not on file    Transportation needs:     Medical: Not on file     Non-medical: Not on file   Tobacco Use    Smoking status: Former Smoker    Smokeless tobacco: Never Used   Substance and Sexual Activity    Alcohol use: Yes     Comment: once in a while    Drug use: No    Sexual activity: Never   Lifestyle    Physical activity:     Days per week: Not on file     Minutes per session: Not on file    Stress: Not on file   Relationships    Social connections:     Talks on phone: Not on file     Gets together: Not on file     Attends Judaism service: Not on file     Active member of club or organization: Not on file     Attends meetings of clubs or organizations: Not on file     Relationship status: Not on file   Other Topics Concern    Not on file   Social History Narrative    Not on file           Objective:        /68   Pulse 70   Resp 13   Ht 6' (1.829 m)   Wt 100.5 kg (221 lb 9  oz)   BMI 30.05 kg/m²     Physical Exam   Constitutional: Patient is oriented to person, place, and time. Patient appears well-developed and well-nourished. No acute distress.     Psychiatric: Patient has a normal mood and affect. Patient's speech is normal and behavior is normal. Judgment is normal. Cognition and memory are normal.     Right pedal exam was performed today.  Vascular: Pedal pulses palpable 1/4 DP & PT.  CFT is = 3 seconds to the hallux.  Skin temperature is warm to cool proximal tibia to distal toes without localized increase in calor noted.  Localized erythema and edema without ecchymosis noted to the plantar medial arch at the insertion of the medial band of the plantar fascia on the calcaneus.  Hair growth present distally to the LE.     Musculoskeletal: Ankle joint ROM is decreased. Subtalar joint ROM is decreased.  Midtarsal joint ROM is decreased.  1st ray ROM is within normal limits.  1st  MTPJ ROM is decreased.  Ankle joint dorsiflexion is restricted with the knee extended and flexed per Silfverskiold exam.    Muscle strength is 5/5 for all LE muscle groups tested.    Neurological: Epicritic sensation is grossly Intact to the foot.   Achilles DTR and Chaddock STR are Intact to right lower extremity.   Negative Babinski sign right lower extremity.  Negative clonus sign right lower extremity.    Dermatological: Toenails 1-5 right are WNL in length and thickness.  Webspaces 1-4 right are clean, dry, and intact.  Skin turgor is supple.  No dry, flaky skin noted to the LE.  No open wounds or suspicious pigmented lesions appreciable to the foot or ankle.    Nursing note and vitals reviewed.        Assessment:       Encounter Diagnoses   Name Primary?    Plantar fasciitis Yes    Equinus contracture of right ankle     Calcaneal spur of foot, right     Pes cavus of right foot     Pain in right foot          Plan:       I counseled the patient on his conditions, their implications and medical  management.    - Reviewed with patient xrays from 4/2/19.    - Educated patient on proper supportive, accommodative shoe gear.    - Educated patient on PRICE therapy and Achilles/plantar fascial stretches with demonstration of stretches and stretching technique handout given to patient.    Patient was given the following recommendations and instructions:  Patient Instructions   - Wear supportive shoes such as sneakers with arch supports.  Look for New Balance or Mcelroy sneakers.    - Look for Superfeet or Powerstep arch supports.    - Rest/reduce ambulation to necessary activities of daily living.    - Ice heel for no more than 20 minutes/per hour.    - Elevate foot as much as possible throughout the day.    - Perform Achilles/plantar fascia stretches as much as possible throughout the day.    - Apply OTC topical arnica to affected area for pain relief and to reduce bruising/swelling.    - Notify clinic if pain worsens or fails to improve.    - Follow up in 2 weeks for heel pain.          Yuliya Wilkins DPM        Dictation was performed using M*Modal Fluency.  Transcription errors may be present.

## 2019-04-08 LAB
ABDOMINAL IMA AP: 1.7 CM
ABDOMINAL IMA ED VEL: 0 CM/S
ABDOMINAL IMA PS VEL: 89 CM/S
ABDOMINAL IMA TRANS: 1.7 CM
ABDOMINAL INFRARENAL AORTA AP: 1.7 CM
ABDOMINAL INFRARENAL AORTA ED VEL: 0 CM/S
ABDOMINAL INFRARENAL AORTA PS VEL: 81 CM/S
ABDOMINAL INFRARENAL AORTA TRANS: 1.7 CM
ABDOMINAL JUXTARENAL AORTA AP: 1.9 CM
ABDOMINAL JUXTARENAL AORTA ED VEL: 15 CM/S
ABDOMINAL JUXTARENAL AORTA PS VEL: 75 CM/S
ABDOMINAL JUXTARENAL AORTA TRANS: 1.9 CM
ABDOMINAL LT COM ILIAC AP: 1.1 CM
ABDOMINAL LT COM ILIAC TRANS: 1.1 CM
ABDOMINAL LT COM ILIAC VEL: 124 CM/S
ABDOMINAL LT COM ILLIAC ED VEL: 0 CM/S
ABDOMINAL RT COM ILIAC AP: 1.1 CM
ABDOMINAL RT COM ILIAC TRANS: 1.5 CM
ABDOMINAL RT COM ILIAC VEL: 102 CM/S
ABDOMINAL RT COM ILLIAC ED VEL: 0 CM/S
ABDOMINAL SUPRARENAL AORTA AP: 2.1 CM
ABDOMINAL SUPRARENAL AORTA ED VEL: 14 CM/S
ABDOMINAL SUPRARENAL AORTA PS VEL: 77 CM/S
ABDOMINAL SUPRARENAL AORTA TRANS: 2.1 CM

## 2019-04-08 RX ORDER — ROSUVASTATIN CALCIUM 10 MG/1
10 TABLET, COATED ORAL
Qty: 36 TABLET | Refills: 3 | Status: SHIPPED | OUTPATIENT
Start: 2019-04-08 | End: 2020-12-11 | Stop reason: SINTOL

## 2019-04-15 ENCOUNTER — OFFICE VISIT (OUTPATIENT)
Dept: SPINE | Facility: CLINIC | Age: 69
End: 2019-04-15
Payer: MEDICARE

## 2019-04-15 ENCOUNTER — PATIENT OUTREACH (OUTPATIENT)
Dept: ADMINISTRATIVE | Facility: HOSPITAL | Age: 69
End: 2019-04-15

## 2019-04-15 VITALS
WEIGHT: 221.56 LBS | HEART RATE: 83 BPM | SYSTOLIC BLOOD PRESSURE: 127 MMHG | BODY MASS INDEX: 30.01 KG/M2 | HEIGHT: 72 IN | DIASTOLIC BLOOD PRESSURE: 81 MMHG

## 2019-04-15 DIAGNOSIS — M43.16 SPONDYLOLISTHESIS OF LUMBAR REGION: ICD-10-CM

## 2019-04-15 DIAGNOSIS — M54.41 CHRONIC RIGHT-SIDED LOW BACK PAIN WITH RIGHT-SIDED SCIATICA: Primary | ICD-10-CM

## 2019-04-15 DIAGNOSIS — G89.29 CHRONIC RIGHT-SIDED LOW BACK PAIN WITH RIGHT-SIDED SCIATICA: Primary | ICD-10-CM

## 2019-04-15 PROCEDURE — 99203 PR OFFICE/OUTPT VISIT, NEW, LEVL III, 30-44 MIN: ICD-10-PCS | Mod: S$PBB,,, | Performed by: PHYSICIAN ASSISTANT

## 2019-04-15 PROCEDURE — 99203 OFFICE O/P NEW LOW 30 MIN: CPT | Mod: S$PBB,,, | Performed by: PHYSICIAN ASSISTANT

## 2019-04-15 PROCEDURE — 99999 PR PBB SHADOW E&M-EST. PATIENT-LVL III: CPT | Mod: PBBFAC,,, | Performed by: PHYSICIAN ASSISTANT

## 2019-04-15 PROCEDURE — 99213 OFFICE O/P EST LOW 20 MIN: CPT | Mod: PBBFAC,PN | Performed by: PHYSICIAN ASSISTANT

## 2019-04-15 PROCEDURE — 99999 PR PBB SHADOW E&M-EST. PATIENT-LVL III: ICD-10-PCS | Mod: PBBFAC,,, | Performed by: PHYSICIAN ASSISTANT

## 2019-04-15 NOTE — LETTER
April 16, 2019      Neeru Bolivar, IGNACIO  [address]  [facility] [address]           [facility]  [address] [facility] [address]  [facility] [address]  Phone: [phone]  Fax: [phone]          Patient: Chris Quiroz   MR Number: [id number]   YOB: 1950   Date of Visit: 4/15/2019       Dear Neeru Bolivar:    Thank you for referring Chris Quiroz to me for evaluation. Attached you will find relevant portions of my assessment and plan of care.    If you have questions, please do not hesitate to call me. I look forward to following Chris Quiroz along with you.    Sincerely,    Katalina Elliott PA-C    Enclosure  CC:  No Recipients    If you would like to receive this communication electronically, please contact [email or web address] or [phone] to request more information on [employer] Link access.    For providers and/or their staff who would like to refer a patient to [facility], please contact us through our one-stop-shop provider referral line, [facility], at [phone].    If you feel you have received this communication in error or would no longer like to receive these types of communications, please e-mail [email or web address]

## 2019-04-16 NOTE — PROGRESS NOTES
Neurosurgery History & Physical    Patient ID: Chris Quiroz is a 68 y.o. male.    Chief Complaint   Patient presents with    Low-back Pain     He has had low back pain for a long time and it has recently become constant. Pain radiates down the right leg. Exercises are helping the pain. Wearing a right knee brace and cold compresses help pain. Sitting and walking makes pain worse.       Review of Systems   Constitutional: Negative for activity change, chills, fatigue and unexpected weight change.   HENT: Negative for hearing loss, tinnitus, trouble swallowing and voice change.    Eyes: Negative for visual disturbance.   Respiratory: Negative for apnea, chest tightness and shortness of breath.    Cardiovascular: Negative for chest pain and palpitations.   Gastrointestinal: Negative for abdominal pain, constipation, diarrhea, nausea and vomiting.   Genitourinary: Negative for difficulty urinating, dysuria and frequency.   Musculoskeletal: Positive for back pain, gait problem and myalgias. Negative for neck pain and neck stiffness.   Skin: Negative for wound.   Neurological: Negative for dizziness, tremors, seizures, facial asymmetry, speech difficulty, weakness, light-headedness, numbness and headaches.   Psychiatric/Behavioral: Negative for confusion and decreased concentration.       Past Medical History:   Diagnosis Date    Anxiety     Atherosclerosis of coronary artery     per CT scan dated 7/13/18    Depression     Diverticulosis of intestine without bleeding     GERD (gastroesophageal reflux disease)     Hyperlipidemia     Hypertension     Thoracic aorta atherosclerosis     per CT dated 7/13/18. sent for scanning    Vitamin D deficiency      Social History     Socioeconomic History    Marital status:      Spouse name: Not on file    Number of children: Not on file    Years of education: Not on file    Highest education level: Not on file   Occupational History    Not on file    Social Needs    Financial resource strain: Not on file    Food insecurity:     Worry: Not on file     Inability: Not on file    Transportation needs:     Medical: Not on file     Non-medical: Not on file   Tobacco Use    Smoking status: Former Smoker    Smokeless tobacco: Never Used   Substance and Sexual Activity    Alcohol use: Yes     Comment: once in a while    Drug use: No    Sexual activity: Never   Lifestyle    Physical activity:     Days per week: Not on file     Minutes per session: Not on file    Stress: Not on file   Relationships    Social connections:     Talks on phone: Not on file     Gets together: Not on file     Attends Oriental orthodox service: Not on file     Active member of club or organization: Not on file     Attends meetings of clubs or organizations: Not on file     Relationship status: Not on file   Other Topics Concern    Not on file   Social History Narrative    Not on file     Family History   Problem Relation Age of Onset    Diabetes Mother     Depression Mother     Alcohol abuse Father     Cancer Father     Depression Father     Diabetes Sister     Cancer Sister     Cancer Brother     Mental illness Sister     Cancer Sister     Arthritis Sister     Cancer Sister      Review of patient's allergies indicates:   Allergen Reactions    Cat dander        Current Outpatient Medications:     acetaminophen (TYLENOL EXTRA STRENGTH) 500 MG tablet, Take 500 mg by mouth every 6 (six) hours as needed for Pain., Disp: , Rfl:     clonazePAM (KLONOPIN) 1 MG tablet, Take 1 tablet (1 mg total) by mouth 2 (two) times daily., Disp: 60 tablet, Rfl: 0    glucosamine-chondroitin 500-400 mg tablet, Take 1 tablet by mouth 3 (three) times daily., Disp: , Rfl:     L.acidophil,parac-S.therm-Bif. (RISAQUAD) Cap capsule, Take 1 capsule by mouth once daily., Disp: , Rfl:     lamoTRIgine (LAMICTAL) 25 MG tablet, Take 25 mg by mouth 2 (two) times daily., Disp: , Rfl:     pantoprazole  (PROTONIX) 40 MG tablet, Take 1 tablet (40 mg total) by mouth 2 (two) times daily with meals., Disp: 60 tablet, Rfl: 3    rosuvastatin (CRESTOR) 10 MG tablet, Take 1 tablet (10 mg total) by mouth 3 (three) times a week., Disp: 36 tablet, Rfl: 3    sertraline (ZOLOFT) 100 MG tablet, Take 1 tablet (100 mg total) by mouth once daily. (Patient taking differently: Take 50 mg by mouth once daily. ), Disp: 90 tablet, Rfl: 1    Vitals:    04/15/19 1412   BP: 127/81   BP Location: Left arm   Patient Position: Sitting   BP Method: Large (Automatic)   Pulse: 83   Weight: 100.5 kg (221 lb 9 oz)   Height: 6' (1.829 m)       Physical Exam   Constitutional: He is oriented to person, place, and time. He appears well-developed and well-nourished.   HENT:   Head: Normocephalic and atraumatic.   Eyes: Pupils are equal, round, and reactive to light.   Neck: Normal range of motion. Neck supple.   Cardiovascular: Normal rate.   Pulmonary/Chest: Effort normal.   Abdominal: He exhibits no distension.   Musculoskeletal: Normal range of motion. He exhibits no edema.   Neurological: He is alert and oriented to person, place, and time. He has a normal Finger-Nose-Finger Test, a normal Heel to Shin Test, a normal Romberg Test and a normal Tandem Gait Test. Gait normal.   Reflex Scores:       Tricep reflexes are 2+ on the right side and 2+ on the left side.       Bicep reflexes are 2+ on the right side and 2+ on the left side.       Brachioradialis reflexes are 2+ on the right side and 2+ on the left side.       Patellar reflexes are 2+ on the right side and 2+ on the left side.       Achilles reflexes are 2+ on the right side and 2+ on the left side.  Skin: Skin is warm and dry.   Psychiatric: He has a normal mood and affect. His speech is normal and behavior is normal. Judgment and thought content normal.   Nursing note and vitals reviewed.      Neurologic Exam     Mental Status   Oriented to person, place, and time.   Oriented to person.    Oriented to place.   Oriented to time.   Follows 3 step commands.   Attention: normal. Concentration: normal.   Speech: speech is normal   Level of consciousness: alert  Knowledge: consistent with education.   Able to name object. Able to read. Able to repeat. Able to write. Normal comprehension.     Cranial Nerves     CN II   Visual acuity: normal  Right visual field deficit: none  Left visual field deficit: none     CN III, IV, VI   Pupils are equal, round, and reactive to light.  Right pupil: Size: 3 mm. Shape: regular. Reactivity: brisk. Consensual response: intact.   Left pupil: Size: 3 mm. Shape: regular. Reactivity: brisk. Consensual response: intact.   CN III: no CN III palsy  CN VI: no CN VI palsy  Nystagmus: none   Diplopia: none  Ophthalmoparesis: none  Conjugate gaze: present    CN V   Right facial sensation deficit: none  Left facial sensation deficit: none    CN VII   Right facial weakness: none  Left facial weakness: none    CN VIII   Hearing: intact    CN IX, X   CN IX normal.   CN X normal.     CN XI   Right sternocleidomastoid strength: normal  Left sternocleidomastoid strength: normal  Right trapezius strength: normal  Left trapezius strength: normal    CN XII   Fasciculations: absent  Tongue deviation: none    Motor Exam   Muscle bulk: normal  Overall muscle tone: normal  Right arm pronator drift: absent  Left arm pronator drift: absent    Strength   Right neck flexion: 5/5  Left neck flexion: 5/5  Right neck extension: 5/5  Left neck extension: 5/5  Right deltoid: 5/5  Left deltoid: 5/5  Right biceps: 5/5  Left biceps: 5/5  Right triceps: 5/5  Left triceps: 5/5  Right wrist flexion: 5/5  Left wrist flexion: 5/5  Right wrist extension: 5/5  Left wrist extension: 5/5  Right interossei: 5/5  Left interossei: 5/5  Right abdominals: 5/5  Left abdominals: 5/5  Right iliopsoas: 5/5  Left iliopsoas: 5/5  Right quadriceps: 5/5  Left quadriceps: 5/5  Right hamstrin/5  Left hamstrin/5  Right  glutei: 5/5  Left glutei: 5/5  Right anterior tibial: 5/5  Left anterior tibial: 5/5  Right posterior tibial: 5/5  Left posterior tibial: 5/5  Right peroneal: 5/5  Left peroneal: 5/5  Right gastroc: 5/5  Left gastroc: 5/5    Sensory Exam   Right arm light touch: normal  Left arm light touch: normal  Right leg light touch: normal  Left leg light touch: normal  Right arm vibration: normal  Left arm vibration: normal  Right arm pinprick: normal  Left arm pinprick: normal    Gait, Coordination, and Reflexes     Gait  Gait: normal    Coordination   Romberg: negative  Finger to nose coordination: normal  Heel to shin coordination: normal  Tandem walking coordination: normal    Tremor   Resting tremor: absent  Intention tremor: absent  Action tremor: absent    Reflexes   Right brachioradialis: 2+  Left brachioradialis: 2+  Right biceps: 2+  Left biceps: 2+  Right triceps: 2+  Left triceps: 2+  Right patellar: 2+  Left patellar: 2+  Right achilles: 2+  Left achilles: 2+  Right Kathleen: absent  Left Kathleen: absent  Right ankle clonus: absent  Left ankle clonus: absent      Provider dictation:  68 year old male with anxiety, depression, GERD, HTN, and HLD is referred by Neeru Bolivar for evaluation of back and right leg pain.  He describes chronic right sided lower back pain that has been increasing.  Pain radiates from the right lower back to th right groin/ hip, posterior thigh, anterior > posterior knee and anterio-medial shin to the heel.  He denies numbness, tingling and bowel bladder dysfunction.  He stumbles multiple times per day while walking.  He has seen some improvement of pain with initiation of plantar fasciits stretches.  He wears a knee brace and uses cold compresses to help with overall leg pain.  He takes tylenol for pain and is scheduled to start PT next week.    Oswestry score: 42%.  PHQ:  18.    He is neurologically intact on exam without focal deficits.  He has 5/5 strength, 2+ muscle stretch reflexes  and no dermatomal sensory deficits.    I have reviewed xrays of the lumbar spine from 4-2-19 demonstrating 3mm anterolisthesis of L4 on L5.  She is disk space narrowing at L3/4, L4/5 and L5/S1.  There is no instability on flexion and extension.      Mr. Quiroz has chronic progressively increasing lower back and right leg radiculopathy  - possibly L4 in distribution and related to L4/5 anterolisthesis.  To help with pain, options include PT, pain management injections and possible surgical intervention. MRI can be performed knowing he has anteriolisthesis and would definitely be carried out Prior to further considering MALCOM or sigical intervention.  He understands all options and consideration of MRI.  He would like to proceed with PT at this time holding on MRI until PT is completed and if it does not help. Follow up in clinic after PT to monitor progress.    Visit Diagnosis:  Chronic right-sided low back pain with right-sided sciatica    Spondylolisthesis of lumbar region        Total time spent counseling greater than fifty percent of total visit time.  Counseling included discussion regarding imaging findings, diagnosis possibilities, treatment options, risks and benefits.   The patient had many questions regarding the options and long-term effects.

## 2019-05-02 ENCOUNTER — OFFICE VISIT (OUTPATIENT)
Dept: FAMILY MEDICINE | Facility: CLINIC | Age: 69
End: 2019-05-02
Payer: MEDICARE

## 2019-05-02 VITALS
OXYGEN SATURATION: 95 % | HEIGHT: 72 IN | DIASTOLIC BLOOD PRESSURE: 60 MMHG | TEMPERATURE: 99 F | RESPIRATION RATE: 18 BRPM | SYSTOLIC BLOOD PRESSURE: 120 MMHG | BODY MASS INDEX: 29.99 KG/M2 | WEIGHT: 221.38 LBS | HEART RATE: 76 BPM

## 2019-05-02 DIAGNOSIS — Z12.5 PROSTATE CANCER SCREENING: ICD-10-CM

## 2019-05-02 DIAGNOSIS — E78.2 MIXED HYPERLIPIDEMIA: ICD-10-CM

## 2019-05-02 DIAGNOSIS — F41.9 ANXIETY: ICD-10-CM

## 2019-05-02 DIAGNOSIS — M62.830 LUMBAR PARASPINAL MUSCLE SPASM: Primary | ICD-10-CM

## 2019-05-02 DIAGNOSIS — M72.2 PLANTAR FASCIITIS: ICD-10-CM

## 2019-05-02 DIAGNOSIS — M77.31 CALCANEAL SPUR OF FOOT, RIGHT: ICD-10-CM

## 2019-05-02 DIAGNOSIS — F32.A DEPRESSION, UNSPECIFIED DEPRESSION TYPE: ICD-10-CM

## 2019-05-02 DIAGNOSIS — Z23 IMMUNIZATION DUE: ICD-10-CM

## 2019-05-02 DIAGNOSIS — M24.571 EQUINUS CONTRACTURE OF RIGHT ANKLE: ICD-10-CM

## 2019-05-02 LAB — COMPLEXED PSA SERPL-MCNC: 0.92 NG/ML (ref 0–4)

## 2019-05-02 PROCEDURE — 84153 ASSAY OF PSA TOTAL: CPT

## 2019-05-02 PROCEDURE — 36415 COLL VENOUS BLD VENIPUNCTURE: CPT | Mod: S$GLB,,, | Performed by: NURSE PRACTITIONER

## 2019-05-02 PROCEDURE — 99214 PR OFFICE/OUTPT VISIT, EST, LEVL IV, 30-39 MIN: ICD-10-PCS | Mod: S$GLB,,, | Performed by: NURSE PRACTITIONER

## 2019-05-02 PROCEDURE — 99214 OFFICE O/P EST MOD 30 MIN: CPT | Mod: S$GLB,,, | Performed by: NURSE PRACTITIONER

## 2019-05-02 PROCEDURE — 36415 PR COLLECTION VENOUS BLOOD,VENIPUNCTURE: ICD-10-PCS | Mod: S$GLB,,, | Performed by: NURSE PRACTITIONER

## 2019-05-02 RX ORDER — ACETAMINOPHEN 500 MG
2 TABLET ORAL NIGHTLY
COMMUNITY
End: 2023-01-01

## 2019-05-02 NOTE — PROGRESS NOTES
Subjective:       Patient ID: Chris Quiroz is a 68 y.o. male.    Chief Complaint: Follow-up    HPI he is here today for follow up on his leg pain. He has been going to [facility] physical therapy for his RLE pain and hip, thigh and calf muscle pain. He was having difficulty getting up and walking after sitting. His pain started as heel pain several months ago that progressed up to his hip and lower back. He feels the pain most with certain movements. It is hard for him to get in and out of his car. He gets relief when laying down. He was having some severe limitations with squatting, sitting, standing and walking due to pain. His physical Therapy evaluation was reviewed. He was evaluated by PT on 4/23/19.     He saw Podiatry on 4/15/19 for the foot pain and was diagnosed with Plantar Fasciitis. He was given home exercises to do and those are helping. He will follow up with podiatry.     He states he was concerned about some memory issues he was having. He was advised by Psychiatry to stop taking the antihistamines he was taking for his allergies because it was felt those could be a contributing factor. He states now his allergies are really acting up. He is wondering if he can use flonase. Advised he can try that.   Normal saline nasal irrigations would be beneficial as well.     Has stopped drinking alcohol. States since then he has been much More alert and having less muscle pain. He is planning to continue with the No Drinking routine.    Brother had prostate cancer. Check PSA.     Depression and anxiety well controlled on the Zoloft 100mg daily. He takes Klonopin on rare occasion that he feels overly anxious. Not often.     Hyperlipidemia: stable on the crestor. Last labs in January showed LDL at 120. Continue to follow low fat, low carb diet.     No other concerns today. See ROS.    The following portion of the patients history was reviewed and updated as appropriate: allergies, current medications, past  medical and surgical history. Past social history and problem list reviewed. Family PMH and Past social history reviewed. Tobacco, Illicit drug use reviewed.     Review of Systems  Constitutional: Negative for activity change, appetite change, chills, fatigue, fever and unexpected weight change.   HENT: Negative for congestion, hearing loss, mouth sores, sneezing, trouble swallowing and voice change.    Eyes: Negative for redness and visual disturbance.   Respiratory: Negative for cough, choking, chest tightness, shortness of breath and wheezing.    Cardiovascular: Negative for chest pain, palpitations and leg swelling.   Gastrointestinal: Negative for abdominal distention, abdominal pain, blood in stool, constipation, diarrhea, nausea and vomiting.   Endocrine: Negative for cold intolerance, heat intolerance, polydipsia, polyphagia and polyuria.   Genitourinary: Negative for decreased urine volume, difficulty urinating, flank pain, frequency and urgency.   Musculoskeletal: Positive for arthralgias, back pain and gait problem.        See HPI   Skin: Negative for pallor, rash and wound.   Allergic/Immunologic: Negative for environmental allergies and food allergies.   Neurological: Negative for dizziness, tremors, seizures, speech difficulty, weakness and headaches.   Hematological: Negative for adenopathy. Does not bruise/bleed easily.   Psychiatric/Behavioral: Negative for agitation, behavioral problems, confusion, decreased concentration, dysphoric mood, self-injury, sleep disturbance and suicidal ideas. The patient is not nervous/anxious.    Objective:     /60   Pulse 76   Temp 98.7 °F (37.1 °C) (Oral)   Resp 18   Ht 6' (1.829 m)   Wt 100.4 kg (221 lb 6.4 oz)   SpO2 95%   BMI 30.03 kg/m²      Physical Exam   Constitutional: He is oriented to person, place, and time. He appears well-developed and well-nourished. No distress.   HENT:   Head: Normocephalic and atraumatic.   Mouth/Throat: No  oropharyngeal exudate.   Eyes: Pupils are equal, round, and reactive to light. Conjunctivae are normal. Right eye exhibits no discharge. Left eye exhibits no discharge.   Neck: Normal range of motion. Neck supple. No JVD present. No thyromegaly present.   Cardiovascular: Normal rate, regular rhythm and normal heart sounds. Exam reveals no gallop.   No murmur heard.  Pulmonary/Chest: Effort normal and breath sounds normal. No respiratory distress. He has no wheezes. He has no rales. He exhibits no tenderness.   Abdominal: Soft. Bowel sounds are normal. He exhibits no distension. There is no tenderness. There is no rebound and no guarding.   Musculoskeletal: Normal range of motion. He exhibits no edema.   Gait is better. Stable gait. Tenderness along the right SI joint and along midline lower paraspinal muscles, but he states has improved   Lymphadenopathy:     He has no cervical adenopathy.   Neurological: He is alert and oriented to person, place, and time.   Skin: Skin is warm and dry. Capillary refill takes less than 2 seconds. No rash noted. He is not diaphoretic.   Psychiatric: He has a normal mood and affect. His behavior is normal. Judgment and thought content normal.   Nursing note and vitals reviewed.      Assessment:       1. Lumbar paraspinal muscle spasm    2. Prostate cancer screening    3. Immunization due    4. Plantar fasciitis    5. Equinus contracture of right ankle    6. Calcaneal spur of foot, right    7. Depression, unspecified depression type    8. Mixed hyperlipidemia    9. Anxiety        Plan:         Chris was seen today for follow-up.    Diagnoses and all orders for this visit:    Lumbar paraspinal muscle spasm: continue to follow with physical therapy. Will monitor.     Prostate cancer screening  -     PSA, Screening    Immunization due: will send to pharmacy  -     diphth,pertus,acell,,tetanus (BOOSTRIX) 2.5-8-5 Lf-mcg-Lf/0.5mL Susp; Inject 0.5 mLs into the muscle once. for 1 dose  -      varicella-zoster gE-AS01B, PF, (SHINGRIX, PF,) 50 mcg/0.5 mL injection; Inject 0.5 mLs into the muscle once. for 1 dose    Plantar fasciitis: followed by Podiatry. Continue with home exercises.    Equinus contracture of right ankle: following with Podiatry    Calcaneal spur of foot, right: following with podiatry    Depression, unspecified depression type: continue on the zoloft at current dose. He feels symptoms are stable.    Mixed hyperlipidemia: continue on crestor. Follow low fat, low carb diet.    Anxiety: controlled. Continue klonopin only on PRN basis. No signs of abuse by the [employer] website. Last filled 1/28/19.  Do not take with ETOH, Sedatives or other narcotics. Do not take and drive due to potential for sedation. Do not take more than the prescribed amount.    Continue current medication  Take medications only as prescribed  Healthy diet, exercise  Adequate rest  Adequate hydration  Avoid allergens  Avoid excessive caffeine

## 2019-07-22 RX ORDER — SERTRALINE HYDROCHLORIDE 100 MG/1
100 TABLET, FILM COATED ORAL DAILY
Qty: 90 TABLET | Refills: 1 | Status: SHIPPED | OUTPATIENT
Start: 2019-07-22 | End: 2020-03-20

## 2019-07-22 RX ORDER — CLONAZEPAM 1 MG/1
TABLET ORAL
Qty: 60 TABLET | Refills: 0 | Status: SHIPPED | OUTPATIENT
Start: 2019-07-22 | End: 2019-10-21 | Stop reason: SDUPTHER

## 2019-08-07 ENCOUNTER — PATIENT OUTREACH (OUTPATIENT)
Dept: ADMINISTRATIVE | Facility: HOSPITAL | Age: 69
End: 2019-08-07

## 2019-08-07 NOTE — PROGRESS NOTES
Health Maintenance Due   Topic Date Due    TETANUS VACCINE  10/29/1968    Shingles Vaccine (1 of 2) 10/29/2002    Influenza Vaccine (1) 08/01/2019

## 2019-08-19 ENCOUNTER — OFFICE VISIT (OUTPATIENT)
Dept: FAMILY MEDICINE | Facility: CLINIC | Age: 69
End: 2019-08-19
Payer: MEDICARE

## 2019-08-19 VITALS
WEIGHT: 220.81 LBS | HEART RATE: 85 BPM | TEMPERATURE: 98 F | DIASTOLIC BLOOD PRESSURE: 70 MMHG | BODY MASS INDEX: 29.91 KG/M2 | SYSTOLIC BLOOD PRESSURE: 118 MMHG | HEIGHT: 72 IN | OXYGEN SATURATION: 96 % | RESPIRATION RATE: 18 BRPM

## 2019-08-19 DIAGNOSIS — F32.A DEPRESSION, UNSPECIFIED DEPRESSION TYPE: ICD-10-CM

## 2019-08-19 DIAGNOSIS — R07.9 CHEST PAIN, UNSPECIFIED TYPE: ICD-10-CM

## 2019-08-19 DIAGNOSIS — E78.5 HYPERLIPIDEMIA, UNSPECIFIED HYPERLIPIDEMIA TYPE: Primary | ICD-10-CM

## 2019-08-19 DIAGNOSIS — F41.9 ANXIETY: ICD-10-CM

## 2019-08-19 DIAGNOSIS — Z12.5 ENCOUNTER FOR SCREENING FOR MALIGNANT NEOPLASM OF PROSTATE: ICD-10-CM

## 2019-08-19 DIAGNOSIS — M72.2 PLANTAR FASCIITIS: ICD-10-CM

## 2019-08-19 PROBLEM — K92.2 ACUTE UPPER GI BLEED: Status: RESOLVED | Noted: 2018-12-06 | Resolved: 2019-08-19

## 2019-08-19 LAB
ALBUMIN SERPL BCP-MCNC: 3.8 G/DL (ref 3.5–5.2)
ALP SERPL-CCNC: 71 U/L (ref 55–135)
ALT SERPL W/O P-5'-P-CCNC: 18 U/L (ref 10–44)
ANION GAP SERPL CALC-SCNC: 14 MMOL/L (ref 8–16)
AST SERPL-CCNC: 25 U/L (ref 10–40)
BILIRUB SERPL-MCNC: 1 MG/DL (ref 0.1–1)
BUN SERPL-MCNC: 9 MG/DL (ref 8–23)
CALCIUM SERPL-MCNC: 9.6 MG/DL (ref 8.7–10.5)
CHLORIDE SERPL-SCNC: 103 MMOL/L (ref 95–110)
CHOLEST SERPL-MCNC: 231 MG/DL (ref 120–199)
CHOLEST/HDLC SERPL: 3.9 {RATIO} (ref 2–5)
CO2 SERPL-SCNC: 24 MMOL/L (ref 23–29)
CREAT SERPL-MCNC: 1 MG/DL (ref 0.5–1.4)
EST. GFR  (AFRICAN AMERICAN): >60 ML/MIN/1.73 M^2
EST. GFR  (NON AFRICAN AMERICAN): >60 ML/MIN/1.73 M^2
GLUCOSE SERPL-MCNC: 109 MG/DL (ref 70–110)
HDLC SERPL-MCNC: 60 MG/DL (ref 40–75)
HDLC SERPL: 26 % (ref 20–50)
LDLC SERPL CALC-MCNC: 134 MG/DL (ref 63–159)
NONHDLC SERPL-MCNC: 171 MG/DL
POTASSIUM SERPL-SCNC: 4.6 MMOL/L (ref 3.5–5.1)
PROT SERPL-MCNC: 7.3 G/DL (ref 6–8.4)
SODIUM SERPL-SCNC: 141 MMOL/L (ref 136–145)
TRIGL SERPL-MCNC: 185 MG/DL (ref 30–150)

## 2019-08-19 PROCEDURE — 80053 COMPREHEN METABOLIC PANEL: CPT

## 2019-08-19 PROCEDURE — 99214 OFFICE O/P EST MOD 30 MIN: CPT | Mod: S$GLB,,, | Performed by: NURSE PRACTITIONER

## 2019-08-19 PROCEDURE — 80061 LIPID PANEL: CPT

## 2019-08-19 PROCEDURE — 84153 ASSAY OF PSA TOTAL: CPT

## 2019-08-19 PROCEDURE — 99214 PR OFFICE/OUTPT VISIT, EST, LEVL IV, 30-39 MIN: ICD-10-PCS | Mod: S$GLB,,, | Performed by: NURSE PRACTITIONER

## 2019-08-19 NOTE — PROGRESS NOTES
Subjective:       Patient ID: Chris Quiroz is a 68 y.o. male.    Chief Complaint: Follow-up (3 month follow up)    HPI here for follow up on several issues:    1. Hyperlipidemia: he is tolerating his cholesterol medication without side effects. Last labs looked good. Due for repeat lipid.     2. 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 He is taking the zoloft daily. The klonopin only as needed for panic symptoms. He gets his lamictal from psychiatry.     3. Right leg pain: states this has greatly improved since physical therapy. States he continues to do his daily exercises.    4. Plantar fasciitis: is followed by Podiatry. No pain at this time.    5. Chest pain: states he has intermittent chest pain. Not associated with exertion. States in the center of his chest and moves to the left. Not associated with nausea, diaphoresis. Does not radiate into the jaw or down the left arm. Had stress test done 12 years ago. Will schedule for evaluation.     He has no other concerns today. See ROS.    Review of Systems   Constitutional: Negative for activity change, appetite change, fatigue and fever.   HENT: Negative for congestion, sneezing, trouble swallowing and voice change.    Eyes: Negative for visual disturbance.   Respiratory: Positive for chest tightness (with anxiety). Negative for cough, choking, shortness of breath and wheezing.    Cardiovascular: Positive for chest pain. Negative for palpitations and leg swelling.   Gastrointestinal: Negative for abdominal distention, abdominal pain, blood in stool, constipation, diarrhea, nausea and vomiting.   Genitourinary: Negative for decreased urine volume, difficulty urinating, flank pain, frequency and urgency.   Musculoskeletal: Positive for arthralgias (random joint pain and stiffness at times) and gait problem (due to foot pain at  times and sciatica. good at this time. see HPI). Negative for back pain.   Skin: Negative for pallor, rash and wound.   Neurological: Negative for weakness and headaches.   Hematological: Negative for adenopathy. Does not bruise/bleed easily.   Psychiatric/Behavioral: Negative for dysphoric mood and sleep disturbance. The patient is not nervous/anxious.        Objective:     /70   Pulse 85   Temp 98 °F (36.7 °C) (Oral)   Resp 18   Ht 6' (1.829 m)   Wt 100.2 kg (220 lb 12.8 oz)   SpO2 96%   BMI 29.95 kg/m²      Physical Exam   Constitutional: He is oriented to person, place, and time. He appears well-developed and well-nourished. He is cooperative. No distress.   HENT:   Right Ear: Tympanic membrane and ear canal normal.   Left Ear: Tympanic membrane and ear canal normal.   Nose: No mucosal edema, rhinorrhea or sinus tenderness.   Mouth/Throat: Uvula is midline and mucous membranes are normal. No oropharyngeal exudate, posterior oropharyngeal edema or posterior oropharyngeal erythema.   Eyes: Pupils are equal, round, and reactive to light. Conjunctivae and lids are normal. Right eye exhibits no discharge and no exudate. Left eye exhibits no discharge and no exudate.   Neck: Trachea normal, normal range of motion and full passive range of motion without pain. Neck supple. No JVD present. No tracheal tenderness present. Carotid bruit is not present. No tracheal deviation present. No thyroid mass and no thyromegaly present.   Cardiovascular: Normal rate, regular rhythm, S1 normal, S2 normal, normal heart sounds and normal pulses.   No murmur heard.  Pulmonary/Chest: Effort normal and breath sounds normal. He has no wheezes. He has no rhonchi. He has no rales. He exhibits no tenderness.   Abdominal: Soft. Normal appearance and bowel sounds are normal. He exhibits no distension and no abdominal bruit. There is no hepatosplenomegaly. There is no tenderness. No hernia.   Musculoskeletal: Normal range of motion.    Gait slow, steady. Upper and lower extremity strength WNL.    Lymphadenopathy:     He has no cervical adenopathy.   Neurological: He is alert and oriented to person, place, and time. He has normal strength. He displays no tremor.   Skin: Skin is warm and dry. Capillary refill takes less than 2 seconds. No rash noted.   Psychiatric: He has a normal mood and affect. His speech is normal and behavior is normal. Thought content normal. His mood appears not anxious. He does not exhibit a depressed mood.       Assessment:       1. Hyperlipidemia, unspecified hyperlipidemia type    2. Encounter for screening for malignant neoplasm of prostate     3. Chest pain, unspecified type    4. Anxiety    5. Depression, unspecified depression type    6. Plantar fasciitis        Plan:         Chris was seen today for follow-up.    Diagnoses and all orders for this visit:    Hyperlipidemia, unspecified hyperlipidemia type: due for repeat labs. Tolerating his cholesterol medication well. Last LDL was 120. Triglycerides had gone down from 299 to 144. He states he hydrates well. Does not exercise on a regular basis.  -     Lipid panel  -     Comprehensive metabolic panel    Encounter for screening for malignant neoplasm of prostate   -     PSA, Screening    Chest pain, unspecified type: will get stress echo for evaluation. No CP at this time.  -     Echocardiogram stress test with [facility]; Future    Anxiety: doing well on current medications. Continue as prescribed.    Depression, unspecified depression type: stable on the zoloft. Continue at current dose.    Plantar fasciitis: stable. Continue to follow with Podiatry.     Continue current medication  Take medications only as prescribed  Healthy diet, exercise  Adequate rest  Adequate hydration  Avoid allergens  Avoid excessive caffeine

## 2019-08-20 LAB — COMPLEXED PSA SERPL-MCNC: 1.3 NG/ML (ref 0–4)

## 2019-08-21 ENCOUNTER — CLINICAL SUPPORT (OUTPATIENT)
Dept: FAMILY MEDICINE | Facility: CLINIC | Age: 69
End: 2019-08-21
Payer: MEDICARE

## 2019-08-21 ENCOUNTER — TELEPHONE (OUTPATIENT)
Dept: FAMILY MEDICINE | Facility: CLINIC | Age: 69
End: 2019-08-21

## 2019-08-21 PROCEDURE — G0008 ADMIN INFLUENZA VIRUS VAC: HCPCS | Mod: S$GLB,,, | Performed by: INTERNAL MEDICINE

## 2019-08-21 PROCEDURE — 90686 IIV4 VACC NO PRSV 0.5 ML IM: CPT | Mod: S$GLB,,, | Performed by: INTERNAL MEDICINE

## 2019-08-21 PROCEDURE — G0008 FLU VACCINE (QUAD) GREATER THAN OR EQUAL TO 3YO PRESERVATIVE FREE IM: ICD-10-PCS | Mod: S$GLB,,, | Performed by: INTERNAL MEDICINE

## 2019-08-21 PROCEDURE — 90686 FLU VACCINE (QUAD) GREATER THAN OR EQUAL TO 3YO PRESERVATIVE FREE IM: ICD-10-PCS | Mod: S$GLB,,, | Performed by: INTERNAL MEDICINE

## 2019-08-21 NOTE — TELEPHONE ENCOUNTER
----- Message from Neeru Bolivar NP sent at 8/20/2019  3:07 PM CDT -----  PSA was normal range. Cholesterol went back up with LDL at 134 and triglycerides at 185.  If he taking the crestor?  Everything else looked good.

## 2019-08-21 NOTE — TELEPHONE ENCOUNTER
Pt notified of lab results.  States he is not taking his crestor, but will resume now.  Pt verbalizes understanding.

## 2019-08-22 ENCOUNTER — CLINICAL SUPPORT (OUTPATIENT)
Dept: CARDIOLOGY | Facility: CLINIC | Age: 69
End: 2019-08-22
Attending: NURSE PRACTITIONER
Payer: MEDICARE

## 2019-08-22 VITALS — BODY MASS INDEX: 29.8 KG/M2 | WEIGHT: 220 LBS | HEIGHT: 72 IN

## 2019-08-22 DIAGNOSIS — R07.9 CHEST PAIN, UNSPECIFIED TYPE: ICD-10-CM

## 2019-08-22 PROCEDURE — 93351 STRESS TTE COMPLETE: CPT | Mod: PBBFAC,PO | Performed by: INTERNAL MEDICINE

## 2019-08-22 PROCEDURE — 93351 ECHOCARDIOGRAM STRESS TEST WITH COLOR FLOW DOPPLER (CUPID ONLY): ICD-10-PCS | Mod: 26,S$PBB,, | Performed by: INTERNAL MEDICINE

## 2019-08-22 PROCEDURE — 99999 PR PBB SHADOW E&M-EST. PATIENT-LVL I: CPT | Mod: PBBFAC,,,

## 2019-08-22 PROCEDURE — 99211 OFF/OP EST MAY X REQ PHY/QHP: CPT | Mod: PBBFAC,PO

## 2019-08-22 PROCEDURE — 99999 PR PBB SHADOW E&M-EST. PATIENT-LVL I: ICD-10-PCS | Mod: PBBFAC,,,

## 2019-08-26 LAB
ASCENDING AORTA: 3.21 CM
AV INDEX (PROSTH): 0.83
AV MEAN GRADIENT: 4 MMHG
AV PEAK GRADIENT: 6 MMHG
AV VALVE AREA: 2.9 CM2
AV VELOCITY RATIO: 0.93
BSA FOR ECHO PROCEDURE: 2.25 M2
CV ECHO LV RWT: 0.4 CM
CV STRESS BASE HR: 77 BPM
DIASTOLIC BLOOD PRESSURE: 95 MMHG
DOP CALC AO PEAK VEL: 1.23 M/S
DOP CALC AO VTI: 29.13 CM
DOP CALC LVOT AREA: 3.5 CM2
DOP CALC LVOT DIAMETER: 2.11 CM
DOP CALC LVOT PEAK VEL: 1.15 M/S
DOP CALC LVOT STROKE VOLUME: 84.4 CM3
DOP CALCLVOT PEAK VEL VTI: 24.15 CM
E WAVE DECELERATION TIME: 234.05 MSEC
E/A RATIO: 1.04
E/E' RATIO: 9.87 M/S
ECHO LV POSTERIOR WALL: 0.85 CM (ref 0.6–1.1)
FRACTIONAL SHORTENING: 28 % (ref 28–44)
INTERVENTRICULAR SEPTUM: 1.02 CM (ref 0.6–1.1)
IVRT: 0.13 MSEC
LA MAJOR: 5.03 CM
LA MINOR: 5.66 CM
LA WIDTH: 3.36 CM
LEFT ATRIUM SIZE: 3.75 CM
LEFT ATRIUM VOLUME INDEX: 25.7 ML/M2
LEFT ATRIUM VOLUME: 57.05 CM3
LEFT INTERNAL DIMENSION IN SYSTOLE: 3.07 CM (ref 2.1–4)
LEFT VENTRICLE DIASTOLIC VOLUME INDEX: 36.5 ML/M2
LEFT VENTRICLE DIASTOLIC VOLUME: 80.98 ML
LEFT VENTRICLE MASS INDEX: 57 G/M2
LEFT VENTRICLE SYSTOLIC VOLUME INDEX: 16.7 ML/M2
LEFT VENTRICLE SYSTOLIC VOLUME: 36.95 ML
LEFT VENTRICULAR INTERNAL DIMENSION IN DIASTOLE: 4.25 CM (ref 3.5–6)
LEFT VENTRICULAR MASS: 127.44 G
LV LATERAL E/E' RATIO: 8.22 M/S
LV SEPTAL E/E' RATIO: 12.33 M/S
MV PEAK A VEL: 0.71 M/S
MV PEAK E VEL: 0.74 M/S
OHS CV CPX 1 MINUTE RECOVERY HEART RATE: 101 BPM
OHS CV CPX 85 PERCENT MAX PREDICTED HEART RATE MALE: 131
OHS CV CPX ESTIMATED METS: 6
OHS CV CPX MAX PREDICTED HEART RATE: 154
OHS CV CPX PATIENT IS FEMALE: 0
OHS CV CPX PATIENT IS MALE: 1
OHS CV CPX PEAK DIASTOLIC BLOOD PRESSURE: 109 MMHG
OHS CV CPX PEAK HEAR RATE: 129 BPM
OHS CV CPX PEAK RATE PRESSURE PRODUCT: NORMAL
OHS CV CPX PEAK SYSTOLIC BLOOD PRESSURE: 188 MMHG
OHS CV CPX PERCENT MAX PREDICTED HEART RATE ACHIEVED: 84
OHS CV CPX RATE PRESSURE PRODUCT PRESENTING: NORMAL
PULM VEIN S/D RATIO: 1.18
PV PEAK D VEL: 0.4 M/S
PV PEAK S VEL: 0.47 M/S
RA MAJOR: 4.68 CM
RA WIDTH: 3.79 CM
RIGHT VENTRICULAR END-DIASTOLIC DIMENSION: 4.37 CM
RV TISSUE DOPPLER FREE WALL SYSTOLIC VELOCITY 1 (APICAL 4 CHAMBER VIEW): 11.57 CM/S
SINUS: 3.65 CM
STJ: 2.98 CM
STRESS ECHO POST EXERCISE DUR MIN: 3 MINUTES
STRESS ECHO POST EXERCISE DUR SEC: 40 SECONDS
SYSTOLIC BLOOD PRESSURE: 161 MMHG
TDI LATERAL: 0.09 M/S
TDI SEPTAL: 0.06 M/S
TDI: 0.08 M/S
TRICUSPID ANNULAR PLANE SYSTOLIC EXCURSION: 2.02 CM

## 2019-10-21 DIAGNOSIS — F41.9 ANXIETY: Primary | ICD-10-CM

## 2019-10-22 RX ORDER — CLONAZEPAM 1 MG/1
1 TABLET ORAL 2 TIMES DAILY
Qty: 60 TABLET | Refills: 0 | Status: SHIPPED | OUTPATIENT
Start: 2019-10-22 | End: 2020-02-19 | Stop reason: ALTCHOICE

## 2020-01-02 ENCOUNTER — OFFICE VISIT (OUTPATIENT)
Dept: FAMILY MEDICINE | Facility: CLINIC | Age: 70
End: 2020-01-02
Payer: MEDICARE

## 2020-01-02 VITALS
TEMPERATURE: 99 F | WEIGHT: 222.69 LBS | HEIGHT: 72 IN | OXYGEN SATURATION: 97 % | HEART RATE: 102 BPM | SYSTOLIC BLOOD PRESSURE: 130 MMHG | BODY MASS INDEX: 30.16 KG/M2 | DIASTOLIC BLOOD PRESSURE: 80 MMHG | RESPIRATION RATE: 20 BRPM

## 2020-01-02 DIAGNOSIS — J30.81 ALLERGIC RHINITIS DUE TO ANIMAL HAIR AND DANDER: ICD-10-CM

## 2020-01-02 DIAGNOSIS — T78.40XA ALLERGIC REACTION, INITIAL ENCOUNTER: Primary | ICD-10-CM

## 2020-01-02 DIAGNOSIS — H11.32 CONJUNCTIVAL HEMORRHAGE OF LEFT EYE: ICD-10-CM

## 2020-01-02 PROCEDURE — 96372 THER/PROPH/DIAG INJ SC/IM: CPT | Mod: S$GLB,,, | Performed by: NURSE PRACTITIONER

## 2020-01-02 PROCEDURE — 1159F PR MEDICATION LIST DOCUMENTED IN MEDICAL RECORD: ICD-10-PCS | Mod: S$GLB,,, | Performed by: NURSE PRACTITIONER

## 2020-01-02 PROCEDURE — 99213 PR OFFICE/OUTPT VISIT, EST, LEVL III, 20-29 MIN: ICD-10-PCS | Mod: 25,S$GLB,, | Performed by: NURSE PRACTITIONER

## 2020-01-02 PROCEDURE — 99213 OFFICE O/P EST LOW 20 MIN: CPT | Mod: 25,S$GLB,, | Performed by: NURSE PRACTITIONER

## 2020-01-02 PROCEDURE — 1159F MED LIST DOCD IN RCRD: CPT | Mod: S$GLB,,, | Performed by: NURSE PRACTITIONER

## 2020-01-02 PROCEDURE — 1126F AMNT PAIN NOTED NONE PRSNT: CPT | Mod: S$GLB,,, | Performed by: NURSE PRACTITIONER

## 2020-01-02 PROCEDURE — 96372 PR INJECTION,THERAP/PROPH/DIAG2ST, IM OR SUBCUT: ICD-10-PCS | Mod: S$GLB,,, | Performed by: NURSE PRACTITIONER

## 2020-01-02 PROCEDURE — 1126F PR PAIN SEVERITY QUANTIFIED, NO PAIN PRESENT: ICD-10-PCS | Mod: S$GLB,,, | Performed by: NURSE PRACTITIONER

## 2020-01-02 RX ORDER — DEXAMETHASONE SODIUM PHOSPHATE 4 MG/ML
8 INJECTION, SOLUTION INTRA-ARTICULAR; INTRALESIONAL; INTRAMUSCULAR; INTRAVENOUS; SOFT TISSUE ONCE
Status: COMPLETED | OUTPATIENT
Start: 2020-01-02 | End: 2020-01-02

## 2020-01-02 RX ADMIN — DEXAMETHASONE SODIUM PHOSPHATE 8 MG: 4 INJECTION, SOLUTION INTRA-ARTICULAR; INTRALESIONAL; INTRAMUSCULAR; INTRAVENOUS; SOFT TISSUE at 09:01

## 2020-01-02 NOTE — PROGRESS NOTES
Subjective:       Patient ID: Chris Quiroz is a 69 y.o. male.    Chief Complaint: left eye red, irritated, swollen    HPI here with concerns regarding left eye irritation. Was staying with a friend that had cats that he is allergic too. His allergies were really bad. Has been rubbing his eyes, itching. He has busted blood vessels in his left eye. No crusting. Sneezing better. Taking benadryl. States this has happened in the past when he had a bad allergy attack. States the eye is not painful. His vision is normal. He is able to open his eye without difficulty. He has had some wheezing. See ROS.    The following portion of the patients history was reviewed and updated as appropriate: allergies, current medications, past medical and surgical history. Past social history and problem list reviewed. Family PMH and Past social history reviewed. Tobacco, Illicit drug use reviewed.      Review of patient's allergies indicates:   Allergen Reactions    Cat dander        Current Outpatient Medications:     acetaminophen (TYLENOL EXTRA STRENGTH) 500 MG tablet, Take 500 mg by mouth every 6 (six) hours as needed for Pain., Disp: , Rfl:     clonazePAM (KLONOPIN) 1 MG tablet, Take 1 tablet (1 mg total) by mouth 2 (two) times daily., Disp: 60 tablet, Rfl: 0    glucosamine-chondroitin 500-400 mg tablet, Take 1 tablet by mouth 3 (three) times daily., Disp: , Rfl:     lamoTRIgine (LAMICTAL) 25 MG tablet, Take 25 mg by mouth 2 (two) times daily., Disp: , Rfl:     melatonin 5 mg Tab, melatonin, Disp: , Rfl:     pantoprazole (PROTONIX) 40 MG tablet, Take 1 tablet (40 mg total) by mouth 2 (two) times daily with meals., Disp: 60 tablet, Rfl: 3    rosuvastatin (CRESTOR) 10 MG tablet, Take 1 tablet (10 mg total) by mouth 3 (three) times a week., Disp: 36 tablet, Rfl: 3    sertraline (ZOLOFT) 100 MG tablet, Take 1 tablet (100 mg total) by mouth once daily., Disp: 90 tablet, Rfl: 1    Past Medical History:   Diagnosis Date     Anxiety     Atherosclerosis of coronary artery     per CT scan dated 7/13/18    Depression     Diverticulosis of intestine without bleeding     GERD (gastroesophageal reflux disease)     Hyperlipidemia     Hypertension     Plantar fasciitis     Thoracic aorta atherosclerosis     per CT dated 7/13/18. sent for scanning    Vitamin D deficiency        Past Surgical History:   Procedure Laterality Date    COLONOSCOPY      ESOPHAGOGASTRODUODENOSCOPY Left 12/7/2018    Procedure: EGD (ESOPHAGOGASTRODUODENOSCOPY);  Surgeon: Josiah Cuadra MD;  Location: [facility];  Service: Endoscopy;  Laterality: Left;       Social History     Socioeconomic History    Marital status:      Spouse name: Not on file    Number of children: Not on file    Years of education: Not on file    Highest education level: Not on file   Occupational History    Not on file   Social Needs    Financial resource strain: Not on file    Food insecurity:     Worry: Not on file     Inability: Not on file    Transportation needs:     Medical: Not on file     Non-medical: Not on file   Tobacco Use    Smoking status: Former Smoker    Smokeless tobacco: Never Used   Substance and Sexual Activity    Alcohol use: Not Currently     Frequency: Never     Comment: once in a while    Drug use: No     Comment: Tried  CBD     Sexual activity: Never   Lifestyle    Physical activity:     Days per week: Not on file     Minutes per session: Not on file    Stress: Not on file   Relationships    Social connections:     Talks on phone: Not on file     Gets together: Not on file     Attends Shinto service: Not on file     Active member of club or organization: Not on file     Attends meetings of clubs or organizations: Not on file     Relationship status: Not on file   Other Topics Concern    Not on file   Social History Narrative    Not on file     Review of Systems   Constitutional: Negative for fatigue and fever.   HENT: Positive for  postnasal drip, rhinorrhea and sneezing. Negative for congestion, sinus pressure, sinus pain, sore throat and trouble swallowing.    Eyes: Positive for redness and itching. Negative for pain, discharge and visual disturbance.   Respiratory: Positive for cough and wheezing. Negative for chest tightness and shortness of breath.    Cardiovascular: Negative for chest pain, palpitations and leg swelling.   Gastrointestinal: Negative for abdominal pain, diarrhea, nausea and vomiting.   Musculoskeletal: Negative for arthralgias, back pain and gait problem.   Neurological: Negative for headaches.       Objective:      /80   Pulse 102   Temp 98.9 °F (37.2 °C) (Oral)   Resp 20   Ht 6' (1.829 m)   Wt 101 kg (222 lb 10.6 oz)   SpO2 97%   BMI 30.20 kg/m²      Physical Exam   Constitutional: He is oriented to person, place, and time. He appears well-developed and well-nourished. No distress.   HENT:   Head: Normocephalic and atraumatic.   Right Ear: Tympanic membrane, external ear and ear canal normal.   Left Ear: Tympanic membrane, external ear and ear canal normal.   Nose: Rhinorrhea present. Right sinus exhibits no maxillary sinus tenderness and no frontal sinus tenderness. Left sinus exhibits no maxillary sinus tenderness and no frontal sinus tenderness.   Mouth/Throat: Uvula is midline, oropharynx is clear and moist and mucous membranes are normal. No oropharyngeal exudate.   Eyes: Pupils are equal, round, and reactive to light. EOM are normal. Right eye exhibits no discharge. Left eye exhibits no discharge. Left conjunctiva has a hemorrhage.   Neck: Normal range of motion. Neck supple.   Cardiovascular: Normal rate, regular rhythm and normal heart sounds. Exam reveals no gallop.   No murmur heard.  Pulmonary/Chest: Effort normal. No respiratory distress. He has wheezes (very mild faint end exp wheeze) in the right lower field and the left lower field. He has no rales. He exhibits no tenderness.    Musculoskeletal: Normal range of motion. He exhibits no edema.   Gait and coordination normal.  strong, equal. Upper and lower extremity strength normal.    Lymphadenopathy:     He has no cervical adenopathy.   Neurological: He is alert and oriented to person, place, and time.   Skin: Skin is warm and dry. Capillary refill takes less than 2 seconds. No rash noted. He is not diaphoretic.   Psychiatric: He has a normal mood and affect. His speech is normal and behavior is normal.   Nursing note and vitals reviewed.          Assessment:       1. Allergic reaction, initial encounter    2. Allergic rhinitis due to animal hair and dander    3. Conjunctival hemorrhage of left eye        Plan:       Allergic reaction, initial encounter  -     dexamethasone injection 8 mg:  Risks and benefits discussed. Potential side effects such as anxiety, palpitations and flushing discussed. May increase blood glucose levels over the next 48 hours. Potential for skin atrophy at injection site. Tolerated injection well.    Allergic rhinitis due to animal hair and dander: avoid allergen. Take zyrtec daily.     Conjunctival hemorrhage of left eye: no vision changes or pain. Will take weeks for this to resolve. Avoid rubbing the eyes. If symptoms change follow up right away or go to ER.  No treatment needed.     Continue current medication  Take medications only as prescribed  Healthy diet, exercise  Adequate rest  Adequate hydration  Avoid allergens  Avoid excessive caffeine     follow up as needed.

## 2020-02-19 ENCOUNTER — TELEPHONE (OUTPATIENT)
Dept: FAMILY MEDICINE | Facility: CLINIC | Age: 70
End: 2020-02-19

## 2020-02-19 ENCOUNTER — OFFICE VISIT (OUTPATIENT)
Dept: FAMILY MEDICINE | Facility: CLINIC | Age: 70
End: 2020-02-19
Payer: MEDICARE

## 2020-02-19 VITALS
HEART RATE: 76 BPM | BODY MASS INDEX: 30.81 KG/M2 | HEIGHT: 72 IN | OXYGEN SATURATION: 99 % | DIASTOLIC BLOOD PRESSURE: 70 MMHG | TEMPERATURE: 98 F | WEIGHT: 227.5 LBS | RESPIRATION RATE: 18 BRPM | SYSTOLIC BLOOD PRESSURE: 124 MMHG

## 2020-02-19 DIAGNOSIS — I70.0 THORACIC AORTA ATHEROSCLEROSIS: ICD-10-CM

## 2020-02-19 DIAGNOSIS — F41.9 ANXIETY: ICD-10-CM

## 2020-02-19 DIAGNOSIS — I25.10 ATHEROSCLEROSIS OF CORONARY ARTERY, ANGINA PRESENCE UNSPECIFIED, UNSPECIFIED VESSEL OR LESION TYPE, UNSPECIFIED WHETHER NATIVE OR TRANSPLANTED HEART: ICD-10-CM

## 2020-02-19 DIAGNOSIS — J30.9 ALLERGIC RHINITIS, UNSPECIFIED SEASONALITY, UNSPECIFIED TRIGGER: ICD-10-CM

## 2020-02-19 DIAGNOSIS — K21.9 GASTROESOPHAGEAL REFLUX DISEASE, ESOPHAGITIS PRESENCE NOT SPECIFIED: ICD-10-CM

## 2020-02-19 DIAGNOSIS — F32.A DEPRESSION, UNSPECIFIED DEPRESSION TYPE: ICD-10-CM

## 2020-02-19 DIAGNOSIS — E78.5 HYPERLIPIDEMIA, UNSPECIFIED HYPERLIPIDEMIA TYPE: ICD-10-CM

## 2020-02-19 DIAGNOSIS — R41.3 MEMORY CHANGE: Primary | ICD-10-CM

## 2020-02-19 DIAGNOSIS — R42 DIZZINESS: ICD-10-CM

## 2020-02-19 DIAGNOSIS — R29.898 WEAKNESS OF RIGHT LOWER EXTREMITY: ICD-10-CM

## 2020-02-19 LAB
ALBUMIN SERPL BCP-MCNC: 3.6 G/DL (ref 3.5–5.2)
ALP SERPL-CCNC: 76 U/L (ref 55–135)
ALT SERPL W/O P-5'-P-CCNC: 25 U/L (ref 10–44)
ANION GAP SERPL CALC-SCNC: 10 MMOL/L (ref 8–16)
AST SERPL-CCNC: 30 U/L (ref 10–40)
BASOPHILS # BLD AUTO: 0.07 K/UL (ref 0–0.2)
BASOPHILS NFR BLD: 0.8 % (ref 0–1.9)
BILIRUB SERPL-MCNC: 1.1 MG/DL (ref 0.1–1)
BUN SERPL-MCNC: 16 MG/DL (ref 8–23)
CALCIUM SERPL-MCNC: 9.1 MG/DL (ref 8.7–10.5)
CHLORIDE SERPL-SCNC: 105 MMOL/L (ref 95–110)
CO2 SERPL-SCNC: 25 MMOL/L (ref 23–29)
CREAT SERPL-MCNC: 0.8 MG/DL (ref 0.5–1.4)
DIFFERENTIAL METHOD: ABNORMAL
EOSINOPHIL # BLD AUTO: 0.4 K/UL (ref 0–0.5)
EOSINOPHIL NFR BLD: 5.2 % (ref 0–8)
ERYTHROCYTE [DISTWIDTH] IN BLOOD BY AUTOMATED COUNT: 13.7 % (ref 11.5–14.5)
EST. GFR  (AFRICAN AMERICAN): >60 ML/MIN/1.73 M^2
EST. GFR  (NON AFRICAN AMERICAN): >60 ML/MIN/1.73 M^2
GLUCOSE SERPL-MCNC: 87 MG/DL (ref 70–110)
HCT VFR BLD AUTO: 43.7 % (ref 40–54)
HGB BLD-MCNC: 13.6 G/DL (ref 14–18)
IMM GRANULOCYTES # BLD AUTO: 0.03 K/UL (ref 0–0.04)
IMM GRANULOCYTES NFR BLD AUTO: 0.4 % (ref 0–0.5)
LYMPHOCYTES # BLD AUTO: 1.3 K/UL (ref 1–4.8)
LYMPHOCYTES NFR BLD: 15.3 % (ref 18–48)
MCH RBC QN AUTO: 29.1 PG (ref 27–31)
MCHC RBC AUTO-ENTMCNC: 31.1 G/DL (ref 32–36)
MCV RBC AUTO: 93 FL (ref 82–98)
MONOCYTES # BLD AUTO: 0.6 K/UL (ref 0.3–1)
MONOCYTES NFR BLD: 7.1 % (ref 4–15)
NEUTROPHILS # BLD AUTO: 6.1 K/UL (ref 1.8–7.7)
NEUTROPHILS NFR BLD: 71.2 % (ref 38–73)
NRBC BLD-RTO: 0 /100 WBC
PLATELET # BLD AUTO: 142 K/UL (ref 150–350)
PMV BLD AUTO: 13.2 FL (ref 9.2–12.9)
POTASSIUM SERPL-SCNC: 4.4 MMOL/L (ref 3.5–5.1)
PROT SERPL-MCNC: 6.9 G/DL (ref 6–8.4)
RBC # BLD AUTO: 4.68 M/UL (ref 4.6–6.2)
SODIUM SERPL-SCNC: 140 MMOL/L (ref 136–145)
TSH SERPL DL<=0.005 MIU/L-ACNC: 1.42 UIU/ML (ref 0.4–4)
WBC # BLD AUTO: 8.54 K/UL (ref 3.9–12.7)

## 2020-02-19 PROCEDURE — 99214 OFFICE O/P EST MOD 30 MIN: CPT | Mod: S$GLB,,, | Performed by: NURSE PRACTITIONER

## 2020-02-19 PROCEDURE — 85025 COMPLETE CBC W/AUTO DIFF WBC: CPT

## 2020-02-19 PROCEDURE — 84443 ASSAY THYROID STIM HORMONE: CPT

## 2020-02-19 PROCEDURE — 80053 COMPREHEN METABOLIC PANEL: CPT

## 2020-02-19 PROCEDURE — 99214 PR OFFICE/OUTPT VISIT, EST, LEVL IV, 30-39 MIN: ICD-10-PCS | Mod: S$GLB,,, | Performed by: NURSE PRACTITIONER

## 2020-02-19 NOTE — TELEPHONE ENCOUNTER
Called pt regarding referral; explained that we currently do not have availability until the June or July time frame and provided the pt with the number to [facility];pt verbalized understanding

## 2020-02-19 NOTE — PROGRESS NOTES
Venipuncture performed with 21 gauge butterfly, x's 2 attempt,  to R Antecubital vein.  Specimens collected per orders.      Pressure dressing applied to site, instructed patient to remove dressing in 10-15 minutes, OK to re-adjust dressing if pressure causing any discomfort, to observe closely for numbness and/or discoloration to hand or fingers, and to notify provider if bleeding persists after applying constant pressure lasting 30 minutes.

## 2020-02-19 NOTE — TELEPHONE ENCOUNTER
----- Message from Daphney Mujica sent at 2/19/2020  2:34 PM CST -----  Contact:  [facility]  Type: Needs Medical Advice    Who Called:  Alana  Best Call Back Number: [phone]  Additional Information: Pt called to make an appt w/ Dr Newman/neuro. Alana is calling to see if she can get some pt information. Pls call pt adv

## 2020-02-19 NOTE — TELEPHONE ENCOUNTER
Referral was placed by Neeru Bolivar for diagnosis of memory change.  Unable to schedule appt, can you please assist.

## 2020-02-19 NOTE — PROGRESS NOTES
Subjective:       Patient ID: Chris Quiroz is a 69 y.o. male.    Chief Complaint: Follow-up (6 month follow up)    HPI here for 6 month follow up.     1. Hyperlipidemia: taking as prescribed. No side effects to the statin.     2. Depression: he feels like the zoloft is working alright for him but depression is still present. States he feels like he wants to cry all the time.  He would like to talk with someone in psychiatry. He denies thoughts of wanting to harm himself or others.     3. Anxiety: he feels that his anxiety level comes and goes.     4.GERD: good control with the protonix.     5. Memory changes: finds that his memory is not as sharp.  forgetting where he is going. Forgets to do simple things and is repeating himself. He would like to see neurology for evaluation. States he feels lightheaded at times.     He has no other concerns. See ROS.    The following portion of the patients history was reviewed and updated as appropriate: allergies, current medications, past medical and surgical history. Past social history and problem list reviewed. Family PMH and Past social history reviewed. Tobacco, Illicit drug use reviewed.      Review of patient's allergies indicates:   Allergen Reactions    Cat dander          Current Outpatient Medications:     acetaminophen (TYLENOL EXTRA STRENGTH) 500 MG tablet, Take 500 mg by mouth every 6 (six) hours as needed for Pain., Disp: , Rfl:     clonazePAM (KLONOPIN) 1 MG tablet, Take 1 tablet (1 mg total) by mouth 2 (two) times daily., Disp: 60 tablet, Rfl: 0    glucosamine-chondroitin 500-400 mg tablet, Take 1 tablet by mouth 3 (three) times daily., Disp: , Rfl:     melatonin 5 mg Tab, melatonin, Disp: , Rfl:     pantoprazole (PROTONIX) 40 MG tablet, Take 1 tablet (40 mg total) by mouth 2 (two) times daily with meals., Disp: 60 tablet, Rfl: 3    rosuvastatin (CRESTOR) 10 MG tablet, Take 1 tablet (10 mg total) by mouth 3 (three) times a week., Disp: 36  tablet, Rfl: 3    sertraline (ZOLOFT) 100 MG tablet, Take 1 tablet (100 mg total) by mouth once daily., Disp: 90 tablet, Rfl: 1    lamoTRIgine (LAMICTAL) 25 MG tablet, Take 25 mg by mouth 2 (two) times daily., Disp: , Rfl:     Past Medical History:   Diagnosis Date    Anxiety     Atherosclerosis of coronary artery     per CT scan dated 7/13/18    Depression     Diverticulosis of intestine without bleeding     GERD (gastroesophageal reflux disease)     Hyperlipidemia     Hypertension     Plantar fasciitis     Thoracic aorta atherosclerosis     per CT dated 7/13/18. sent for scanning    Vitamin D deficiency        Past Surgical History:   Procedure Laterality Date    COLONOSCOPY      ESOPHAGOGASTRODUODENOSCOPY Left 12/7/2018    Procedure: EGD (ESOPHAGOGASTRODUODENOSCOPY);  Surgeon: Josiah Cuadra MD;  Location: [facility];  Service: Endoscopy;  Laterality: Left;       Social History     Socioeconomic History    Marital status:      Spouse name: Not on file    Number of children: Not on file    Years of education: Not on file    Highest education level: Not on file   Occupational History    Not on file   Social Needs    Financial resource strain: Not on file    Food insecurity:     Worry: Not on file     Inability: Not on file    Transportation needs:     Medical: Not on file     Non-medical: Not on file   Tobacco Use    Smoking status: Former Smoker    Smokeless tobacco: Never Used   Substance and Sexual Activity    Alcohol use: Not Currently     Frequency: Never     Comment: once in a while    Drug use: No     Comment: Tried  CBD     Sexual activity: Never   Lifestyle    Physical activity:     Days per week: Not on file     Minutes per session: Not on file    Stress: Not on file   Relationships    Social connections:     Talks on phone: Not on file     Gets together: Not on file     Attends Synagogue service: Not on file     Active member of club or organization: Not on file      Attends meetings of clubs or organizations: Not on file     Relationship status: Not on file   Other Topics Concern    Not on file   Social History Narrative    Not on file     Review of Systems   Constitutional: Negative for appetite change, chills, fatigue, fever and unexpected weight change.   HENT: Positive for postnasal drip. Negative for congestion, sneezing, trouble swallowing and voice change.    Eyes: Negative for visual disturbance.   Respiratory: Positive for cough (productive, gray/white). Negative for chest tightness, shortness of breath and wheezing.    Cardiovascular: Negative for chest pain, palpitations and leg swelling.   Gastrointestinal: Negative for abdominal pain, blood in stool, constipation, diarrhea, nausea and vomiting.   Genitourinary: Negative for decreased urine volume, difficulty urinating, frequency and urgency.   Musculoskeletal: Positive for gait problem (uses cane at times. sometimes right leg feels weak and goes out from under him). Negative for arthralgias and back pain.   Skin: Negative for rash and wound.   Allergic/Immunologic: Positive for environmental allergies. Negative for food allergies.   Neurological: Positive for light-headedness (when stands up quickly). Negative for dizziness, weakness and headaches.   Hematological: Negative for adenopathy. Does not bruise/bleed easily.   Psychiatric/Behavioral: Positive for confusion, decreased concentration, dysphoric mood and sleep disturbance (has dreams that wake him up). Negative for self-injury and suicidal ideas. The patient is not nervous/anxious.         Feels like crying all the time       Objective:      /70   Pulse 76   Temp 98.3 °F (36.8 °C)   Resp 18   Ht 6' (1.829 m)   Wt 103.2 kg (227 lb 8.2 oz)   SpO2 99%   BMI 30.86 kg/m²      Physical Exam   Constitutional: He is oriented to person, place, and time. He appears well-developed and well-nourished. No distress.   HENT:   Head: Normocephalic and  atraumatic.   Right Ear: Tympanic membrane, external ear and ear canal normal.   Left Ear: Tympanic membrane, external ear and ear canal normal.   Nose: Rhinorrhea present. Right sinus exhibits no maxillary sinus tenderness. Left sinus exhibits no maxillary sinus tenderness.   Mouth/Throat: Uvula is midline and mucous membranes are normal. No oropharyngeal exudate, posterior oropharyngeal edema or posterior oropharyngeal erythema.   Eyes: Pupils are equal, round, and reactive to light. Conjunctivae are normal. Right eye exhibits no discharge. Left eye exhibits no discharge.   Neck: Normal range of motion. Neck supple. No JVD present. No thyromegaly present.   Cardiovascular: Normal rate, regular rhythm and normal heart sounds. Exam reveals no gallop.   No murmur heard.  Pulmonary/Chest: Effort normal and breath sounds normal. No respiratory distress. He has no wheezes. He has no rales. He exhibits no tenderness.   Abdominal: Soft. Bowel sounds are normal. He exhibits no distension. There is no tenderness. There is no rebound and no guarding.   Musculoskeletal: Normal range of motion. He exhibits no edema.   Gait and coordination normal.  strong, equal. Upper and lower extremity strength normal.    Lymphadenopathy:     He has no cervical adenopathy.   Neurological: He is alert and oriented to person, place, and time.   Skin: Skin is warm and dry. Capillary refill takes less than 2 seconds. No rash noted. He is not diaphoretic.   Psychiatric: His speech is normal and behavior is normal. Judgment and thought content normal. He exhibits a depressed mood.   He does not present as a threat to himself or others   Nursing note and vitals reviewed.        Assessment:       1. Memory change    2. Depression, unspecified depression type    3. Dizziness    4. Hyperlipidemia, unspecified hyperlipidemia type    5. Anxiety    6. Gastroesophageal reflux disease, esophagitis presence not specified    7. Atherosclerosis of  coronary artery, angina presence unspecified, unspecified vessel or lesion type, unspecified whether native or transplanted heart    8. Thoracic aorta atherosclerosis    9. Weakness of right lower extremity    10. Allergic rhinitis, unspecified seasonality, unspecified trigger        Plan:       Memory change: will place referral to Neurology for evaluation and treatment options. Will check carotid US. Check labs.  -     Ambulatory referral/consult to Neurology; Future; Expected date: 02/26/2020  -     US Carotid Bilateral; Future; Expected date: 02/19/2020  -     TSH    Depression, unspecified depression type: will refer to psychiatry for evaluation. Continue the zoloft.   -     Ambulatory referral/consult to Psychiatry; Future; Expected date: 02/26/2020    Dizziness: will check carotid US and labs. Could be allergy related.   -     US Carotid Bilateral; Future; Expected date: 02/19/2020  -     TSH    Hyperlipidemia, unspecified hyperlipidemia type: follow low fat, low carb diet. Exercise. Doing well with the crestor. Due for labs.  -     CBC auto differential  -     Comprehensive metabolic panel    Anxiety: continue the zoloft. Will place referral to psychiatry.     Gastroesophageal reflux disease, esophagitis presence not specified: continue the protonix.     Atherosclerosis of coronary artery, angina presence unspecified, unspecified vessel or lesion type, unspecified whether native or transplanted heart: important to keep cholesterol under good control.     Thoracic aorta atherosclerosis: important to keep cholesterol under good control.     Weakness of right lower extremity: he does not want any additional work up for this at this time. If symptoms worsen will follow up. He has seen neurosurgery in the past for his lower back pain on the right side. He will follow up with them as needed     Allergic rhinitis, unspecified seasonality, unspecified trigger: take OTC claritin daily, can use flonase nasal spray.       Continue current medication  Take medications only as prescribed  Healthy diet, exercise  Adequate rest  Adequate hydration  Avoid allergens  Avoid excessive caffeine     follow up after evaluation by neuro and psych, sooner if needed.

## 2020-02-21 ENCOUNTER — TELEPHONE (OUTPATIENT)
Dept: FAMILY MEDICINE | Facility: CLINIC | Age: 70
End: 2020-02-21

## 2020-02-21 ENCOUNTER — TELEPHONE (OUTPATIENT)
Dept: HEMATOLOGY/ONCOLOGY | Facility: CLINIC | Age: 70
End: 2020-02-21

## 2020-02-21 DIAGNOSIS — D69.6 ANEMIA WITH LOW PLATELET COUNT: Primary | ICD-10-CM

## 2020-02-21 NOTE — TELEPHONE ENCOUNTER
Called patient to request to reschedule.  Patient was scheduled to see Dr. Tolentino which sees GI/ patients.  Patient agreed and due to not having calendar will call us back next week to reschedule.     skin normal color for race, warm, dry and intact.

## 2020-02-21 NOTE — TELEPHONE ENCOUNTER
Alana with Dr. Newman's office states patient has already been seen for an appointment and denies any further needs at this time.

## 2020-02-21 NOTE — TELEPHONE ENCOUNTER
----- Message from Daphney Mujica sent at 2/21/2020 10:52 AM CST -----  Contact: Alana w/ Dr Newman's ofc  Type:  Patient Returning Call    Who Called:  Alana  Who Left Message for Patient:  Susan  Does the patient know what this is regarding?:  n/a  Best Call Back Number:  [phone]  Additional Information:  Pls call Alana for further

## 2020-03-03 ENCOUNTER — HOSPITAL ENCOUNTER (OUTPATIENT)
Dept: RADIOLOGY | Facility: HOSPITAL | Age: 70
Discharge: HOME | End: 2020-03-03
Attending: NURSE PRACTITIONER
Payer: MEDICARE

## 2020-03-03 DIAGNOSIS — R42 DIZZINESS: ICD-10-CM

## 2020-03-03 DIAGNOSIS — R41.3 MEMORY CHANGE: ICD-10-CM

## 2020-03-03 PROCEDURE — 93880 US CAROTID BILATERAL: ICD-10-PCS | Mod: 26,,, | Performed by: RADIOLOGY

## 2020-03-03 PROCEDURE — 93880 EXTRACRANIAL BILAT STUDY: CPT | Mod: TC,PO

## 2020-03-03 PROCEDURE — 93880 EXTRACRANIAL BILAT STUDY: CPT | Mod: 26,,, | Performed by: RADIOLOGY

## 2020-03-09 ENCOUNTER — TELEPHONE (OUTPATIENT)
Dept: HEMATOLOGY/ONCOLOGY | Facility: CLINIC | Age: 70
End: 2020-03-09

## 2020-03-09 NOTE — TELEPHONE ENCOUNTER
Spoke with pt re: hematology apt sched at  220PM today.  Verb he was not aware of apt and wanted to check out some things prior to rescheduling apt.  States will call our office back to reschedule when ready.  Contact information provided.  Verb understanding.

## 2020-03-12 ENCOUNTER — OFFICE VISIT (OUTPATIENT)
Dept: FAMILY MEDICINE | Facility: CLINIC | Age: 70
End: 2020-03-12
Payer: MEDICARE

## 2020-03-12 VITALS
HEART RATE: 81 BPM | BODY MASS INDEX: 29.85 KG/M2 | DIASTOLIC BLOOD PRESSURE: 86 MMHG | RESPIRATION RATE: 16 BRPM | SYSTOLIC BLOOD PRESSURE: 132 MMHG | WEIGHT: 220.38 LBS | HEIGHT: 72 IN | TEMPERATURE: 98 F

## 2020-03-12 DIAGNOSIS — F10.21 ALCOHOL USE DISORDER, SEVERE, IN EARLY REMISSION: ICD-10-CM

## 2020-03-12 DIAGNOSIS — F41.9 ANXIETY: ICD-10-CM

## 2020-03-12 DIAGNOSIS — F33.1 MODERATE EPISODE OF RECURRENT MAJOR DEPRESSIVE DISORDER: Primary | ICD-10-CM

## 2020-03-12 PROCEDURE — 99214 PR OFFICE/OUTPT VISIT, EST, LEVL IV, 30-39 MIN: ICD-10-PCS | Mod: S$GLB,,, | Performed by: NURSE PRACTITIONER

## 2020-03-12 PROCEDURE — 99214 OFFICE O/P EST MOD 30 MIN: CPT | Mod: S$GLB,,, | Performed by: NURSE PRACTITIONER

## 2020-03-12 RX ORDER — DIPHENHYDRAMINE HCL 25 MG
25 CAPSULE ORAL DAILY PRN
Status: ON HOLD | COMMUNITY
End: 2021-03-01 | Stop reason: HOSPADM

## 2020-03-12 NOTE — PROGRESS NOTES
Subjective:       Patient ID: Chris Quiroz is a 69 y.o. male.    Chief Complaint: Follow-up (saw Neurosurgeon)    HPI here for follow up after seeing neurosurgery. He had MRI done of the brain. He was seen by Neurology for his memory issues. He was scheduled for MRI and EEG. I do not have a copy of those records. States he was told that he needed to psychiatry. He has issues with his memory. He continues to drink large amounts of ETOH. States he feels depressed. Denies thoughts of wanting to harm himself. See ROS.    The following portion of the patients history was reviewed and updated as appropriate: allergies, current medications, past medical and surgical history. Past social history and problem list reviewed. Family PMH and Past social history reviewed. Tobacco, Illicit drug use reviewed.      Review of patient's allergies indicates:   Allergen Reactions    Juniper Shortness Of Breath     States lungs fill up with fluid    Cat dander          Current Outpatient Medications:     acetaminophen (TYLENOL EXTRA STRENGTH) 500 MG tablet, Take 500 mg by mouth every 6 (six) hours as needed for Pain., Disp: , Rfl:     diphenhydrAMINE (BENADRYL) 25 mg capsule, Take 25 mg by mouth daily as needed for Allergies (reports approximately three times a week)., Disp: , Rfl:     glucosamine-chondroitin 500-400 mg tablet, Take 1 tablet by mouth 3 (three) times daily. , Disp: , Rfl:     melatonin 5 mg Tab, Take 2 mg by mouth nightly. , Disp: , Rfl:     rosuvastatin (CRESTOR) 10 MG tablet, Take 1 tablet (10 mg total) by mouth 3 (three) times a week., Disp: 36 tablet, Rfl: 3    sertraline (ZOLOFT) 100 MG tablet, Take 1 tablet (100 mg total) by mouth once daily., Disp: 90 tablet, Rfl: 1    pantoprazole (PROTONIX) 40 MG tablet, Take 1 tablet (40 mg total) by mouth 2 (two) times daily with meals. (Patient not taking: Reported on 3/12/2020), Disp: 60 tablet, Rfl: 3    Past Medical History:   Diagnosis Date    Anxiety      Atherosclerosis of coronary artery     per CT scan dated 7/13/18    Depression     Diverticulosis of intestine without bleeding     GERD (gastroesophageal reflux disease)     Hyperlipidemia     Hypertension     Plantar fasciitis     Thoracic aorta atherosclerosis     per CT dated 7/13/18. sent for scanning    Vitamin D deficiency        Past Surgical History:   Procedure Laterality Date    COLONOSCOPY      ESOPHAGOGASTRODUODENOSCOPY Left 12/7/2018    Procedure: EGD (ESOPHAGOGASTRODUODENOSCOPY);  Surgeon: Josiah Cuadra MD;  [facility];  Service: Endoscopy;  Laterality: Left;       Social History     Socioeconomic History    Marital status:      Spouse name: Not on file    Number of children: Not on file    Years of education: Not on file    Highest education level: Not on file   Occupational History    Not on file   Social Needs    Financial resource strain: Not on file    Food insecurity:     Worry: Not on file     Inability: Not on file    Transportation needs:     Medical: Not on file     Non-medical: Not on file   Tobacco Use    Smoking status: Former Smoker    Smokeless tobacco: Never Used   Substance and Sexual Activity    Alcohol use: Not Currently     Frequency: Never     Comment: once in a while    Drug use: No     Comment: Tried  CBD     Sexual activity: Never   Lifestyle    Physical activity:     Days per week: Not on file     Minutes per session: Not on file    Stress: Not on file   Relationships    Social connections:     Talks on phone: Not on file     Gets together: Not on file     Attends Sikh service: Not on file     Active member of club or organization: Not on file     Attends meetings of clubs or organizations: Not on file     Relationship status: Not on file   Other Topics Concern    Not on file   Social History Narrative    Not on file     Review of Systems   Constitutional: Negative for fatigue and fever.   HENT: Negative for  sneezing.    Eyes: Negative for redness and visual disturbance.   Respiratory: Negative for cough, chest tightness, shortness of breath and wheezing.    Cardiovascular: Negative for chest pain, palpitations and leg swelling.   Gastrointestinal: Negative for abdominal distention, abdominal pain, diarrhea, nausea and vomiting.   Genitourinary: Negative for difficulty urinating.   Musculoskeletal: Positive for arthralgias. Negative for back pain and gait problem.   Skin: Negative for rash and wound.   Neurological: Negative for weakness and headaches.   Hematological: Negative for adenopathy. Does not bruise/bleed easily.   Psychiatric/Behavioral: Positive for dysphoric mood. Negative for self-injury, sleep disturbance and suicidal ideas. The patient is nervous/anxious.        Objective:      /86 (BP Location: Left arm, Patient Position: Sitting)   Pulse 81   Temp 97.9 °F (36.6 °C) (Oral)   Resp 16   Ht 6' (1.829 m)   Wt 100 kg (220 lb 5.6 oz)   BMI 29.88 kg/m²      Physical Exam   Constitutional: He is oriented to person, place, and time. He appears well-developed and well-nourished. No distress.   HENT:   Head: Normocephalic and atraumatic.   Mouth/Throat: No oropharyngeal exudate.   Eyes: Pupils are equal, round, and reactive to light. Conjunctivae are normal. Right eye exhibits no discharge. Left eye exhibits no discharge.   Neck: Normal range of motion. Neck supple. No JVD present. No thyromegaly present.   Cardiovascular: Normal rate, regular rhythm and normal heart sounds. Exam reveals no gallop.   No murmur heard.  Pulmonary/Chest: Effort normal and breath sounds normal. No respiratory distress. He has no wheezes. He has no rales. He exhibits no tenderness.   Abdominal: Soft. Bowel sounds are normal. He exhibits no distension. There is no tenderness. There is no rebound and no guarding.   Musculoskeletal: Normal range of motion. He exhibits no edema.   Lymphadenopathy:     He has no cervical  adenopathy.   Neurological: He is alert and oriented to person, place, and time.   Skin: Skin is warm and dry. Capillary refill takes less than 2 seconds. No rash noted. He is not diaphoretic.   Psychiatric: Judgment and thought content normal. His mood appears anxious. His speech is rapid and/or pressured. He is slowed. He exhibits a depressed mood.   Nursing note and vitals reviewed.      Assessment:       1. Moderate episode of recurrent major depressive disorder    2. Anxiety    3. Alcohol use disorder, severe, in early remission        Plan:       Moderate episode of recurrent major depressive disorder: continue with current medications. Has appointment with Psychiatry.    Anxiety: continue current medications.    Alcohol use disorder, severe, in early remission: he does not want referral for inhouse rehab. He want to discuss with psychiatry.        Continue current medication  Take medications only as prescribed  Healthy diet, exercise  Adequate rest  Adequate hydration  Avoid allergens  Avoid excessive caffeine     follow up as needed.

## 2020-03-17 ENCOUNTER — TELEPHONE (OUTPATIENT)
Dept: FAMILY MEDICINE | Facility: CLINIC | Age: 70
End: 2020-03-17

## 2020-03-17 NOTE — TELEPHONE ENCOUNTER
----- Message from Neeru Bolivar NP sent at 3/17/2020  8:46 AM CDT -----  He had MRI and EEG done. I need a copy of those reports. You will have to call him to see where he went to have them done. Also get records from whichever Neurosurgeon he went to see please. Thank you.

## 2020-03-18 NOTE — TELEPHONE ENCOUNTER
MRI (head) and EEG done at [facility] - [place] [phone]  MRI (leg) - also done at [facility] in [place], [place] - [phone]    Neurosurgeon - Dr. Tony Lopez with [facility]

## 2020-03-20 ENCOUNTER — OFFICE VISIT (OUTPATIENT)
Dept: PSYCHIATRY | Facility: CLINIC | Age: 70
End: 2020-03-20
Payer: MEDICARE

## 2020-03-20 ENCOUNTER — PATIENT MESSAGE (OUTPATIENT)
Dept: PSYCHIATRY | Facility: CLINIC | Age: 70
End: 2020-03-20

## 2020-03-20 DIAGNOSIS — F33.1 MODERATE EPISODE OF RECURRENT MAJOR DEPRESSIVE DISORDER: ICD-10-CM

## 2020-03-20 DIAGNOSIS — R41.89 COGNITIVE DECLINE: Primary | ICD-10-CM

## 2020-03-20 DIAGNOSIS — F10.21 ALCOHOL USE DISORDER, SEVERE, IN EARLY REMISSION: ICD-10-CM

## 2020-03-20 DIAGNOSIS — F32.A DEPRESSION, UNSPECIFIED DEPRESSION TYPE: ICD-10-CM

## 2020-03-20 PROCEDURE — 90791 PSYCH DIAGNOSTIC EVALUATION: CPT | Mod: 95,,, | Performed by: PSYCHOLOGIST

## 2020-03-20 PROCEDURE — 90791 PR PSYCHIATRIC DIAGNOSTIC EVALUATION: ICD-10-PCS | Mod: 95,,, | Performed by: PSYCHOLOGIST

## 2020-03-20 RX ORDER — SERTRALINE HYDROCHLORIDE 25 MG/1
25 TABLET, FILM COATED ORAL DAILY
Qty: 3 TABLET | Refills: 0 | Status: SHIPPED | OUTPATIENT
Start: 2020-03-20 | End: 2020-04-23
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
## 2020-03-20 NOTE — LETTER
March 20, 2020        Neeru Bolivar, IGNACIO  [address]  [facility] [address]             [facility]  [address]  [facility] [address]  Phone: [phone]   Patient: Chris Quiroz   MR Number: [id number]   YOB: 1950   Date of Visit: 3/20/2020       Dear Dr. Bolivar:    Thank you for referring Chris Quiroz to me for evaluation. Below are the relevant portions of my assessment and plan of care.    Pt is to take Trintellix 5mg with Zoloft 50mg for four days. Then pt is to take Trintellix 5mg with Zoloft 25mg for 3 days. On day 8, pt is to stop Zoloft and increase Trintellix dosage to 10mg once daily. Pt is to continue infrequent Klonopin 1mg Q D PRN use for panic attacks. This will be monitored for abuse. Pt is to continue sobriety and start seeing a counselor. Pt disinterested in AA currently. A neuropsychology referral will be placed to assess the degree of cognitive defecit and assess the interplay between anxiety and perceived vs real cognitive decline and whether his alcohol abuse contributes to his decline.      If you have questions, please do not hesitate to call me. I look forward to following Chris along with you.    Sincerely,      Thomas Temple, PhD, MP           CC  No Recipients

## 2020-03-27 ENCOUNTER — TELEPHONE (OUTPATIENT)
Dept: NEUROLOGY | Facility: CLINIC | Age: 70
End: 2020-03-27

## 2020-03-30 ENCOUNTER — OFFICE VISIT (OUTPATIENT)
Dept: NEUROLOGY | Facility: CLINIC | Age: 70
End: 2020-03-30
Payer: MEDICARE

## 2020-03-30 ENCOUNTER — TELEPHONE (OUTPATIENT)
Dept: NEUROLOGY | Facility: CLINIC | Age: 70
End: 2020-03-30

## 2020-03-30 ENCOUNTER — PATIENT MESSAGE (OUTPATIENT)
Dept: PSYCHIATRY | Facility: CLINIC | Age: 70
End: 2020-03-30

## 2020-03-30 DIAGNOSIS — F33.1 MODERATE EPISODE OF RECURRENT MAJOR DEPRESSIVE DISORDER: ICD-10-CM

## 2020-03-30 DIAGNOSIS — R41.89 COGNITIVE DECLINE: Primary | ICD-10-CM

## 2020-03-30 DIAGNOSIS — F10.21 ALCOHOL USE DISORDER, SEVERE, IN EARLY REMISSION: ICD-10-CM

## 2020-03-30 DIAGNOSIS — F41.9 ANXIETY: ICD-10-CM

## 2020-03-30 DIAGNOSIS — G31.84 MILD COGNITIVE IMPAIRMENT: ICD-10-CM

## 2020-03-30 PROCEDURE — 96121 PR NEUROBEHAVIORAL STAT EXAM, EA ADDTL HR: ICD-10-PCS | Mod: 95,,, | Performed by: PSYCHIATRY & NEUROLOGY

## 2020-03-30 PROCEDURE — 96116 NUBHVL XM PHYS/QHP 1ST HR: CPT | Mod: 95,,, | Performed by: PSYCHIATRY & NEUROLOGY

## 2020-03-30 PROCEDURE — 96116 PR NEUROBEHAVIORAL STATUS EXAM BY PSYCH/PHYS: ICD-10-PCS | Mod: 95,,, | Performed by: PSYCHIATRY & NEUROLOGY

## 2020-03-30 PROCEDURE — 99499 NO LOS: ICD-10-PCS | Mod: 95,,, | Performed by: PSYCHIATRY & NEUROLOGY

## 2020-03-30 PROCEDURE — 96121 NUBHVL XM PHY/QHP EA ADDL HR: CPT | Mod: 95,,, | Performed by: PSYCHIATRY & NEUROLOGY

## 2020-03-30 PROCEDURE — 99499 UNLISTED E&M SERVICE: CPT | Mod: 95,,, | Performed by: PSYCHIATRY & NEUROLOGY

## 2020-03-30 NOTE — PROGRESS NOTES
NEUROPSYCHOLOGY TELEHEALTH INTERVIEW    Referral Information  Name: Chris Quiroz  MRN: [id number]  : 1950  Age: 69 y.o.  Race: White  Gender: male  Referring Provider: Thomas Temple, Phd, Mp  [address] [facility] [place] [address]  Billing: See below for details as coding/billing has changed   Telemedicine:   The patient location is: Home  The provider location is: Home  The chief complaint leading to consultation/medical necessity is: Cognitive complaints  Visit type: Virtual visit with synchronous audio and video was attempted. Technology was not working after multiple attempts, so the interview was conducted via phone.   Total time spent with patient: 50 minutes.   Each patient to whom he or she provides medical services by telemedicine is:  (1) informed of the relationship between the physician and patient and the respective role of any other health care provider with respect to management of the patient; and (2) notified that he or she may decline to receive medical services by telemedicine and may withdraw from such care at any time.  Consent/Emergency Plan: The patient expressed an understanding of the purpose of the evaluation and consented to all procedures. I informed the patient of limits to confidentiality and discussed an emergency plan.    NEUROPSYCHOLOGICAL EVALUATION - CONFIDENTIAL    MEDICAL NECESSITY:  Evaluate cognitive functioning in the setting of cognitive decline.   DATE CONDUCTED: 2020    SOURCES OF INFORMATION:  The following was gathered from a clinical interview with Mr. Chris Quiroz and review of the available medical records. Mr. Quiroz expressed an understanding of the purpose of the evaluation and consented to all procedures. Total licensed billing psychologists professional time including clinical interview, test administration and interpretation of tests administered by the billing psychologist, integration of test results and other clinical  "data, preparing the final report, and personally reporting results to the patient   [id number] - 120 minutes.     SUMMARY/TREATMENT PLAN   Mr. Quirzo will benefit from comprehensive neuropsychological testing once clinic reopens. At this point, there is minimal evidence to suggest that he has a neurodegenerative condition. Rather, his history is more suggestive of the type of cognitive inefficiencies related to cerebrovascular disease. His history is remarkable for longstanding alcohol abuse (currently in early remission), multiple vascular risk factors, and poor sleep quality/quantity. He also described cognitive side effects of medication and hopes these improve with recent changes to his regimen. A neurosurgeon reportedly suggested that he undergo shunt placement to "err on the side of caution," but also noted that his symptoms were not consistent with NPH. Several recommendations are offered at this time.     Diagnoses  Problem List Items Addressed This Visit        Neuro    Mild cognitive impairment    Overview     See 4/2020 Neuropsych note         Current Assessment & Plan     Neurosurgery Consult: Mr. Quiroz may considered a second opinion for shunt placement as a shunt was reportedly recommended by an outside neurosurgeon despite not having symptoms of NPH.     Manage Vascular Risk Factors/Optimize Brain Health: Continue to abstain from alcohol. Maintain a heart health diet and 100% treatment compliance. Prioritize physical and social activity.     Sleep: Improve sleep hygiene. Try to fall asleep at a consistent time with a routine around bedtime. Aim for 8 hours of continuous sleep each night.     Neuropsychology Follow-up: Return to clinic for full testing at reopening of clinic.             Psychiatric    Anxiety    Current Assessment & Plan     Medication Management: Continue to follow with Dr. Temple.    Counseling/Therapy: Mr. Quiroz may also benefit from consistent counseling to address both " "anxiety, depression, and alcohol abuse. He can also explore mindfulness apps, such as Headspace.     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          Moderate episode of recurrent major depressive disorder    Alcohol use disorder, severe, in early remission      Other Visit Diagnoses     Cognitive decline    -  Primary        Thank you for allowing me to participate in Mr. Delvalle care.  If you have any questions, please contact me at [phone].    Haris Jasso Psy.D., LUBNAP  Board Certified in Clinical Neuropsychology  Department of Neurology    HISTORY OF PRESENT ILLNESS: Mr. Chris Quiroz is a 69 y.o., right-handed,   male with 19 years of education who was referred for a neuropsychological evaluation in the setting of self-reported cognitive decline. He believes the onset of his cognitive difficulties coincides with taking sertraline in 2014. This medication helped with anxiety, but it's more difficult to concentrate. He equates this experience with taking amitriptyline in the past. Regardless, he feels that his attention has progressively declined since 2014. He can forget why he walked into a room and notices that he can forget a name or a fact when in conversation. He recalled one instance last year when he was not fully oriented to location while driving, even though he had taken the trip many times. 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 He acknowledged that this was likely due to a variety of factors, including the fact that he was very stressed with poor sleep. The production ended in 7/2019 and he has noticed his overall functioning, mood, and cognition improved over the past 9 months, which he attributes to reduced stress and improved sleep. 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     Mr. Quiroz is followed by medical psychologist, Dr. Thomas Temple. He is in the process of weaning off sertraline and starting Trintellix. He is optimistic that changing medications may also improve his attention/cognition. In addition to the above cognitive complaints, Mr. Quiroz also described longstanding cognitive weaknesses. He feels that names/faces have never been a strength, noting that he would forget a student's name almost immediately at the conclusion of a semester. He always considered himself an "absent minded professor" type who would "leave things behind" (such as student's names) as soon as he was done with a take. He also had a tendency to hyperfocus on tasks at the expense of others. He has written several books and plays and would become engrossed in the topics to the extent that other things took a "back seat."    Mr. Quiroz moved back to [place] about 1 year ago. He is in contact with his ex-wife Dorothea and considers her a strong source of support who lives locally. He also feels that she notices his reduced cognitive clarity, which is worrying. He was especially interested in further cognitive evaluation as his sister  with Alzheimer's disease in her early 70's, with Down syndrome as a comorbidity.He was evaluated by an outside neurologist, who subsequently referred him to neurosurgery after a brain MRI apparently showed enlarged ventricles. Per Mr. Quiroz, the neurosurgeon not that he did not have clinical symptoms of NPH, such as magnetic gait or urinary incontinence, but that they could do a shunt placement to "err on the side of caution." This caused a great deal of anxiety and Mr. Quiroz has not followed up with these providers.     IADLS/DAILY FUNCTIONING: He lives independently. His ex-wife (Dorothea) lives close.    Support System: Dorothea and close friend, " "Gm.   Appointment Management: independent  Medication Compliance: independent with strong compliance.   Financial Management: independent, bills are set to automated debit.   Cooking: independent  Driving: Continues to drive.     MEDICAL HISTORY: Mr. Quiroz  has a past medical history of Anxiety, Atherosclerosis of coronary artery, Depression, Diverticulosis of intestine without bleeding, GERD (gastroesophageal reflux disease), Hyperlipidemia, Hypertension, Plantar fasciitis, Thoracic aorta atherosclerosis, and Vitamin D deficiency. Sleeps for 3-4 hours with "short nightmares" finds that he is "jerking himself awake." He goes to sleep anywhere between 6-9pm. He is up at 3am almost every morning. 1-2 hour nap in the afternoon. He stopped drinking alcohol one month ago. Historically, he develops nightmares after he stops drinking, which makes sleeping unpleasant. The nightmares typically resolve after a few months.     NEUROIMAGING:  None on file, but apparently suggestive of "possible fluid on the brain."    SUBSTANCE USE: Mr. Quiroz  reports that he has quit smoking in 2001. History of polysubstance abuse. Cocaine abuse during the 80's, quit in 1986. Alcohol has been a persistent issue throughout adulthood. Inpatient treatment in 1991. 3 years of daily AA with abstinence. He occasionally returns to AA, but it doesn't connect with him like it did in the early 1990's.     CURRENT MEDICATIONS:      Current Outpatient Medications:     acetaminophen (TYLENOL EXTRA STRENGTH) 500 MG tablet, Take 500 mg by mouth every 6 (six) hours as needed for Pain., Disp: , Rfl:     diphenhydrAMINE (BENADRYL) 25 mg capsule, Take 25 mg by mouth daily as needed for Allergies (reports approximately three times a week)., Disp: , Rfl:     glucosamine-chondroitin 500-400 mg tablet, Take 1 tablet by mouth 3 (three) times daily. , Disp: , Rfl:     melatonin 5 mg Tab, Take 2 mg by mouth nightly. , Disp: , Rfl:     pantoprazole " "(PROTONIX) 40 MG tablet, Take 1 tablet (40 mg total) by mouth 2 (two) times daily with meals. (Patient not taking: Reported on 3/12/2020), Disp: 60 tablet, Rfl: 3    rosuvastatin (CRESTOR) 10 MG tablet, Take 1 tablet (10 mg total) by mouth 3 (three) times a week., Disp: 36 tablet, Rfl: 3    sertraline (ZOLOFT) 25 MG tablet, Take 1 tablet (25 mg total) by mouth once daily. for 3 doses, Disp: 3 tablet, Rfl: 0    vortioxetine (TRINTELLIX) 10 mg Tab, Take 1 tablet (10 mg total) by mouth once daily., Disp: 30 tablet, Rfl: 1     PSYCHIATRIC HISTORY: Followed by medical psychologist Dr. Thomas Temple. Please see Dr. Temple's note from 3/20/2020 for a comprehensive overview of his psychiatric history. In short, his history is remarkable for longstanding depression, anxiety, and substance abuse. He was most recently in therapy 4-5 years ago. Described his current mood as "alright." He is happy that he has distanced himself from theatre, but he is thinking about writing. Anxiety and creativity seem tied for him. Creativity gives him a rush. Described having a "breakdown" in 2014, which precipitated starting sertraline.     SUICIDAL IDEATION:  Active Suicidal Ideation:Denied  Plan/Intent: Denied    FAMILY HISTORY: family history includes Alcohol abuse in his father; Arthritis in his sister; Cancer in his brother, father, sister, sister, and sister; Depression in his father and mother; Diabetes in his mother and sister; Mental illness in his sister.    PSYCHOSOCIAL HISTORY:   Education:   Level Attained: Masters in theatre and 1 year towards PhD.    Learning Difficulties: Denied, excellent student.    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     Relationship Status:   : no   : yes, 2x   Children: none    MENTAL STATUS AND OBSERVATIONS:  APPEARANCE: Casually dressed and adequate grooming/hygiene.   ALERTNESS/ORIENTATION: Attentive and alert.   GAIT/MOTOR: Unable to assess.   SENSORY: Corrective lenses.   SPEECH/LANGUAGE: Normal in rate, rhythm, tone, and volume. Expressive and receptive language were grossly intact.  STATED MOOD/AFFECT: Mood was euthymic.   INTERPERSONAL BEHAVIOR: Rapport was quickly and easily established   THOUGHT PROCESSES: Thoughts seemed logical and goal-directed.    IADL 3/30/2020   Ability to Use Telephone Operates telephone on own initiative, looks up and dials numbers   Shopping Takes care of all shopping needs independently   Food Preparation Plans, prepares, and serves adequate meals independently   Housekeeping Maintains house alone with occasional assistance (heavy work)   Laundry Does personal laundry completely   Mode of Transportation Travels independently on public tranportation or drives own car   Responsibility for Own Medications Is responsible for taking medication in correct dosages at correct time   Ability to Handle Finances Manages financial matters independently (budgets, writes checks, pays rent and bills, goes to bank). Collects and keeps track of income      NPIQ RFS 3/30/2020   WHO IS FILLING OUT FORM? PWD   Does this patient have false beliefs, such as thinking that others are stealing from him/her or planning to harm him/her in some way? No   Does this patient have hallucinations such as false visions or voices? Hooks she/he seem to hear or see things that are not present? No   Is the patient resistive to help from others at times, or hard to handle? No   Does the patient seem sad or say that he/she is depressed? Yes   Depression/Dysphoria Severity 1   Depression/Dysphoria Distress 3   Does the patient become upset when  from you? Does he/she have any other signs of  nervousness such as shortness of breath, sighing, being unable tor elax, or feeling excessively tense? No   Does the patient appear to feel good or act excessively happy? No   Does this patient seem less interested in his/her usual activities or in the activities and plans of others? Yes   Apathy/Indifference Severity 2   Apathy/Indifference Distress 4   Does this patient seem to act cumpolsively, for example, talking to strangers as if she/he knows them, or saying things that may hurt people's feelings? No   Is the patient impatient and cranky? Does he/she have difficulty coping with delays or waiting for planned activities? No   Does the patient engage in repetitive activities such as pacing around the house, handling buttons, wrapping string, or doing other things repeatedly? No   Does this patient awaken you during the night, rise too early in the morning, or take excessive naps during the day? Yes   Nightime Behavior Severity 1   Nightime Behavior Distress 2   Has the patient lost or gained weight, or had a change in the type of food he/she likes? No   NPI Total Severity Score 4   NPI Total Distress Score 9     PHQ9 3/30/2020   Total Score 14

## 2020-04-01 ENCOUNTER — OFFICE VISIT (OUTPATIENT)
Dept: HEMATOLOGY/ONCOLOGY | Facility: CLINIC | Age: 70
End: 2020-04-01
Payer: MEDICARE

## 2020-04-01 ENCOUNTER — TELEPHONE (OUTPATIENT)
Dept: HEMATOLOGY/ONCOLOGY | Facility: CLINIC | Age: 70
End: 2020-04-01

## 2020-04-01 DIAGNOSIS — D69.6 THROMBOCYTOPENIA: Primary | ICD-10-CM

## 2020-04-01 DIAGNOSIS — D64.9 ANEMIA, UNSPECIFIED TYPE: ICD-10-CM

## 2020-04-01 PROCEDURE — 99204 PR OFFICE/OUTPT VISIT, NEW, LEVL IV, 45-59 MIN: ICD-10-PCS | Mod: 95,,, | Performed by: INTERNAL MEDICINE

## 2020-04-01 PROCEDURE — 99204 OFFICE O/P NEW MOD 45 MIN: CPT | Mod: 95,,, | Performed by: INTERNAL MEDICINE

## 2020-04-01 NOTE — TELEPHONE ENCOUNTER
----- Message from Abril Brown sent at 4/1/2020 10:37 AM CDT -----   Type: Needs Medical Advice    Who Called:  pt  Best Call Back Number: [phone]  Additional Information: pt is  Having  Trouble  Getting  In  My video // please call

## 2020-04-01 NOTE — TELEPHONE ENCOUNTER
----- Message from Jj Vegas sent at 4/1/2020 11:01 AM CDT -----  Contact: same  Patient called in and stated he was having issues connecting for his virtual appointment at 10:40am as it kept knocking him out.  Patient would like a call back to see if maybe the virtual could be rescheduled at [phone]

## 2020-04-01 NOTE — TELEPHONE ENCOUNTER
Spoke with pt to notify him that Dr Wilkins can do a telephone visit. Pt verbalized understanding and will wait for Dr Wilkins' call.

## 2020-04-01 NOTE — PROGRESS NOTES
The patient location is: home  The chief complaint leading to consultation is:  Mild anemia and thrombocytopenia  Visit type: Virtual visit with synchronous audio and video  Total time spent with patient: 15  Each patient to whom he or she provides medical services by telemedicine is:  (1) informed of the relationship between the physician and patient and the respective role of any other health care provider with respect to management of the patient; and (2) notified that he or she may decline to receive medical services by telemedicine and may withdraw from such care at any time.    Notes:     HPI    69 years old patient with mild anemia and thrombocytopenia.  Denies overt bleeding.  Denies bruises.  Patient has history of anxiety coronary artery disease depression diverticulosis acid reflux disease and hypertension.    Currently denies any chest pain shortness breath.  Denies any abdominal pain nausea vomiting or diarrhea.  No fever no chills. upper EGD on 12/07/2018 and colonoscopy 04/13/2018        Past Medical History:   Diagnosis Date    Anxiety     Atherosclerosis of coronary artery     per CT scan dated 7/13/18    Depression     Diverticulosis of intestine without bleeding     GERD (gastroesophageal reflux disease)     Hyperlipidemia     Hypertension     Plantar fasciitis     Thoracic aorta atherosclerosis     per CT dated 7/13/18. sent for scanning    Vitamin D deficiency      Social History     Socioeconomic History    Marital status:      Spouse name: Not on file    Number of children: Not on file    Years of education: Not on file    Highest education level: Not on file   Occupational History    Not on file   Social Needs    Financial resource strain: Not on file    Food insecurity:     Worry: Not on file     Inability: Not on file    Transportation needs:     Medical: Not on file     Non-medical: Not on file   Tobacco Use    Smoking status: Former Smoker    Smokeless  tobacco: Never Used   Substance and Sexual Activity    Alcohol use: Not Currently     Frequency: Never     Comment: once in a while    Drug use: No     Comment: Tried  CBD     Sexual activity: Never   Lifestyle    Physical activity:     Days per week: Not on file     Minutes per session: Not on file    Stress: Not on file   Relationships    Social connections:     Talks on phone: Not on file     Gets together: Not on file     Attends [employer] service: Not on file     Active member of club or organization: Not on file     Attends meetings of clubs or organizations: Not on file     Relationship status: Not on file   Other Topics Concern    Not on file   Social History Narrative    Not on file         Subjective      Review of Systems   Constitutional: Negative for appetite change, fatigue and unexpected weight change.   HENT: Negative for mouth sores.   Eyes: Negative for visual disturbance.   Respiratory: Negative for cough and shortness of breath.   Cardiovascular: Negative for chest pain.   Gastrointestinal: Negative for diarrhea.   Genitourinary: Negative for frequency.   Musculoskeletal: Negative for back pain.   Skin: Negative for rash.   Neurological: Negative for headaches.   Hematological: Negative for adenopathy.   Psychiatric/Behavioral: The patient is not nervous/anxious.   All other systems reviewed and are negative.     Objective    Physical Exam   There were no vitals filed for this visit.    COVID-19 percussion measure  CMP  Sodium   Date Value Ref Range Status   02/19/2020 140 136 - 145 mmol/L Final     Potassium   Date Value Ref Range Status   02/19/2020 4.4 3.5 - 5.1 mmol/L Final     Chloride   Date Value Ref Range Status   02/19/2020 105 95 - 110 mmol/L Final     CO2   Date Value Ref Range Status   02/19/2020 25 23 - 29 mmol/L Final     Glucose   Date Value Ref Range Status   02/19/2020 87 70 - 110 mg/dL Final     BUN, Bld   Date Value Ref Range Status   02/19/2020 16 8 - 23 mg/dL Final      Creatinine   Date Value Ref Range Status   02/19/2020 0.8 0.5 - 1.4 mg/dL Final     Calcium   Date Value Ref Range Status   02/19/2020 9.1 8.7 - 10.5 mg/dL Final     Total Protein   Date Value Ref Range Status   02/19/2020 6.9 6.0 - 8.4 g/dL Final     Albumin   Date Value Ref Range Status   02/19/2020 3.6 3.5 - 5.2 g/dL Final     Total Bilirubin   Date Value Ref Range Status   02/19/2020 1.1 (H) 0.1 - 1.0 mg/dL Final     Comment:     For infants and newborns, interpretation of results should be based  on gestational age, weight and in agreement with clinical  observations.  Premature Infant recommended reference ranges:  Up to 24 hours.............<8.0 mg/dL  Up to 48 hours............<12.0 mg/dL  3-5 days..................<15.0 mg/dL  6-29 days.................<15.0 mg/dL       Alkaline Phosphatase   Date Value Ref Range Status   02/19/2020 76 55 - 135 U/L Final     AST   Date Value Ref Range Status   02/19/2020 30 10 - 40 U/L Final     ALT   Date Value Ref Range Status   02/19/2020 25 10 - 44 U/L Final     Anion Gap   Date Value Ref Range Status   02/19/2020 10 8 - 16 mmol/L Final     eGFR if    Date Value Ref Range Status   02/19/2020 >60.0 >60 mL/min/1.73 m^2 Final     eGFR if non    Date Value Ref Range Status   02/19/2020 >60.0 >60 mL/min/1.73 m^2 Final     Comment:     Calculation used to obtain the estimated glomerular filtration  rate (eGFR) is the CKD-EPI equation.        Lab Results   Component Value Date    WBC 8.54 02/19/2020    HGB 13.6 (L) 02/19/2020    HCT 43.7 02/19/2020    MCV 93 02/19/2020     (L) 02/19/2020       Assessment plan      [] Mild anemia and mild thrombocytopenia  > repeat blood work 2 months from now.  > Upper EGD benign appearing esophageal is gastric ulcer multiple duodenal ulcer.  Biopsy negative.      [] Reviewed medication with patient      There are no diagnoses linked to this encounter.

## 2020-04-01 NOTE — LETTER
April 1, 2020      Neeru Bolivar NP  [address] [address]  [facility] [address]           [facility] [address], [facility] [address]  [facility] [address]  Phone: [phone]  Fax: [phone]          Patient: Chris Quiroz   MR Number: [id number]   YOB: 1950   Date of Visit: 4/1/2020       Dear Neeru oBlivar:    Thank you for referring Chris Quiroz to me for evaluation. Attached you will find relevant portions of my assessment and plan of care.    If you have questions, please do not hesitate to call me. I look forward to following Chris Quiroz along with you.    Sincerely,    Duy Wilkins MD    Enclosure  CC:  No Recipients    If you would like to receive this communication electronically, please contact [email or web address] or [phone] to request more information on [employer] access.    For providers and/or their staff who would like to refer a patient to [facility], please contact us through our one-stop-shop provider referral line, [facility], at [phone].    If you feel you have received this communication in error or would no longer like to receive these types of communications, please e-mail [email or web address]

## 2020-04-07 PROBLEM — G31.84 MILD COGNITIVE IMPAIRMENT: Status: ACTIVE | Noted: 2020-04-07

## 2020-04-07 NOTE — PATIENT INSTRUCTIONS
"Mr. Quiroz will benefit from comprehensive neuropsychological testing once clinic reopens. At this point, there is minimal evidence to suggest that he has a neurodegenerative condition. Rather, his history is more suggestive of the type of cognitive inefficiencies related to cerebrovascular disease. His history is remarkable for longstanding alcohol abuse (currently in early remission), multiple vascular risk factors, and poor sleep quality/quantity. He also described cognitive side effects of medication and hopes these improve with recent changes to his regimen. A neurosurgeon reportedly suggested that he undergo shunt placement to "err on the side of caution," but also noted that his symptoms were not consistent with NPH. Several recommendations are offered at this time.     Diagnoses  Problem List Items Addressed This Visit        Neuro    Mild cognitive impairment    Overview     See 4/2020 Neuropsych note         Current Assessment & Plan     Neurosurgery Consult: Mr. Quiroz may considered a second opinion for shunt placement as a shunt was reportedly recommended by an outside neurosurgeon despite not having symptoms of NPH.     Manage Vascular Risk Factors/Optimize Brain Health: Continue to abstain from alcohol. Maintain a heart health diet and 100% treatment compliance. Prioritize physical and social activity.     Sleep: Improve sleep hygiene. Try to fall asleep at a consistent time with a routine around bedtime. Aim for 8 hours of continuous sleep each night.     Neuropsychology Follow-up: Return to clinic for full testing at reopening of clinic.             Psychiatric    Anxiety    Current Assessment & Plan     Medication Management: Continue to follow with Dr. Temple.    Counseling/Therapy: Mr. Quiroz may also benefit from consistent counseling to address both anxiety, depression, and alcohol abuse. He can also explore mindfulness apps, such as Headspace.     Creative Outlets: Explore potential " creative outlets that don't involve producing/directing shows.          Moderate episode of recurrent major depressive disorder    Alcohol use disorder, severe, in early remission      Other Visit Diagnoses     Cognitive decline    -  Primary

## 2020-04-07 NOTE — ASSESSMENT & PLAN NOTE
Medication Management: Continue to follow with Dr. Temple.    Counseling/Therapy: Mr. Qurioz may also benefit from consistent counseling to address both anxiety, depression, and alcohol abuse. He can also explore mindfulness apps, such as Headspace.     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

## 2020-04-07 NOTE — ASSESSMENT & PLAN NOTE
Neurosurgery Consult: Mr. Quiroz may considered a second opinion for shunt placement as a shunt was reportedly recommended by an outside neurosurgeon despite not having symptoms of NPH.     Manage Vascular Risk Factors/Optimize Brain Health: Continue to abstain from alcohol. Maintain a heart health diet and 100% treatment compliance. Prioritize physical and social activity.     Sleep: Improve sleep hygiene. Try to fall asleep at a consistent time with a routine around bedtime. Aim for 8 hours of continuous sleep each night.     Neuropsychology Follow-up: Return to clinic for full testing at reopening of clinic.

## 2020-04-23 ENCOUNTER — OFFICE VISIT (OUTPATIENT)
Dept: PSYCHIATRY | Facility: CLINIC | Age: 70
End: 2020-04-23
Payer: MEDICARE

## 2020-04-23 DIAGNOSIS — F10.21 ALCOHOL USE DISORDER, SEVERE, IN EARLY REMISSION: Primary | ICD-10-CM

## 2020-04-23 DIAGNOSIS — F33.1 MODERATE EPISODE OF RECURRENT MAJOR DEPRESSIVE DISORDER: ICD-10-CM

## 2020-04-23 PROCEDURE — 99214 OFFICE O/P EST MOD 30 MIN: CPT | Mod: 95,,, | Performed by: PSYCHOLOGIST

## 2020-04-23 PROCEDURE — 99214 PR OFFICE/OUTPT VISIT, EST, LEVL IV, 30-39 MIN: ICD-10-PCS | Mod: 95,,, | Performed by: PSYCHOLOGIST

## 2020-04-23 RX ORDER — HYDROXYZINE PAMOATE 25 MG/1
CAPSULE ORAL
Qty: 90 CAPSULE | Refills: 1 | Status: SHIPPED | OUTPATIENT
Start: 2020-04-23 | End: 2020-06-24

## 2020-04-23 NOTE — PROGRESS NOTES
"The patient location is: home  The chief complaint leading to consultation is: depression, alcoholism, anxiety with panic  Visit type: Virtual visit with synchronous audio and video  Total time spent with patient: 20 minutes  Each patient to whom he or she provides medical services by telemedicine is:  (1) informed of the relationship between the physician and patient and the respective role of any other health care provider with respect to management of the patient; and (2) notified that he or she may decline to receive medical services by telemedicine and may withdraw from such care at any time.    Notes:      Outpatient Psychiatry Follow-Up Visit    Clinical Status of Patient: Outpatient (Ambulatory)  04/23/2020     Chief Complaint: 69 year old male presenting today for a follow-up.       Interval History and Content of Current Session:  Interim Events/Subjective Report/Content of Current Session:  follow-up appointment.    Pt is a 69 year old male with past psychiatric hx of depression, alcoholism, anxiety with panic who presents for follow-up treatment. Pt said that he has noted significant improvement in his mood with Trintellix treatment. Pt said that he has said to himself, "where have you been all of my life" and "is this what feeling good feels like?". Pt complained of continued poor sleep and said that he took edible marijuana and had an adverse, paradoxical reaction. Pt did research online and thought it was Serotonin Syndrome. Pt was told that this was not likely the case and he should not use addictive drugs given his addiction history. Will try Atarax treatment.    Past Psychiatric hx: inpt for polysubstance in 1991 and prior treatment with Zoloft, Xanax, Lamictal, Wellbutrin, trazodone, Paxil, Effexor, Elavil, Cymbalta, Lexapro, Celexa, and Prozac    Past Medical hx:   Past Medical History:   Diagnosis Date    Anxiety     Atherosclerosis of coronary artery     per CT scan dated 7/13/18    " Depression     Diverticulosis of intestine without bleeding     GERD (gastroesophageal reflux disease)     Hyperlipidemia     Hypertension     Plantar fasciitis     Thoracic aorta atherosclerosis     per CT dated 7/13/18. sent for scanning    Vitamin D deficiency         Interim hx:  Medication changes last visit:  cross-titrated from Zoloft 100mg to Trintellix 10mg once daily  Anxiety: mild - improved  Depression: mild - improved     Denies suicidal/homicidal ideations.  Denies hopelessness/worthlessness.    Denies auditory/visual hallucinations      Alcohol:  Pt is sober  Drug: THC use  Caffeine: minimal use  Tobacco: pt denied      Review of Systems   · PSYCHIATRIC: Pertinent items are noted in the narrative.        CONSTITUTIONAL: weight stable     Past Medical, Family and Social History: The patient's past medical, family and social history have been reviewed and updated as appropriate within the electronic medical record. See encounter notes.     Current Psychiatric Medication:  Trintellix 10mg once daily     Compliance: yes      Side effects: pt denied     Risk Parameters:  Patient reports no suicidal ideation  Patient reports no homicidal ideation  Patient reports no self-injurious behavior  Patient reports no violent behavior     Exam (detailed: at least 9 elements; comprehensive: all 15 elements)   Constitutional  Vitals:  Most recent vital signs, dated less than 90 days prior to this appointment, were reviewed. BP: ()/()   Arterial Line BP: ()/()       General:  unremarkable, age appropriate, casual attire, good eye contact, good rapport       Musculoskeletal  Muscle Strength/Tone:  no flaccidity, no tremor    Gait & Station:  normal      Psychiatric                       Speech:  normal tone, normal rate, rhythm, and volume   Mood & Affect:   Depressed, anxious         Thought Process:   Goal directed; Linear    Associations:   intact   Thought Content:   No SI/HI, delusions, or paranoia, no AV/VH    Insight & Judgement:   Good, adequate to circumstances   Orientation:   grossly intact; alert and oriented x 4    Memory:  intact for content of interview    Language:  grossly intact, can repeat    Attention Span  : Grossly intact for content of interview   Fund of Knowledge:   intact and appropriate to age and level of education        Assessment and Diagnosis   Status/Progress: improving     Impression: Pt suffers from recurrent depression and panic attacks. Pt has a long history of alcohol dependence with a recent relapse and a history of cocaine abuse. Pt is concerned about his memory decline but this may be related to anxiety about his health.     Diagnosis: 1) Major Depressive Disorder, recurrent, moderate    2) Alcohol Use Disorder, severe, in current remission    Intervention/Counseling/Treatment Plan   · Medication Management:      1. Continue Trintellix 10mg once daily     2. Start Atarax 25mg Q D PRN and 50mg Q HS PRN     3. Do not use drugs/alcohol    4. Start psychotherapy     5. Call to report any worsening of symptoms or problems with the medication. Pt instructed to go to ER with thoughts of harming self, others    Psychotherapy:   · Target symptoms: depression, anxiety  · Why chosen therapy is appropriate versus another modality: CBT used; relevant to diagnosis, patient responds to this modality  · Outcome monitoring methods: self-report, observation  · Therapeutic intervention type: Cognitive Behavioral Therapy  · Topics discussed/themes: building skills sets for symptom management, symptom recognition, nutrition, exercise  · The patient's response to the intervention is good  · Patient's response to treatment is: good.   · The patient's progress toward treatment goals: improving  · Duration of intervention: 20 minutes     Return to clinic: 2 months    -Spent 20min face to face with the pt; >50% time spent in counseling   -Cognitive-Behavioral/Supportive therapy and psychoeducation  provided  -R/B/SE's of medications discussed with the pt who expresses understanding and chooses to take medications as prescribed.   -Pt instructed to call clinic, 911 or go to nearest emergency room if sxs worsen or pt is in   crisis. The pt expresses understanding.    Thomas Temple, PhD, MP     Sleep Aid Initiation:  Patient advised of potential side effects of medication including sleep walking or other complex behaviors.  Patient advised not to mix medication with alcohol, to go to bed immediately after taking, and to stop at first sign of any unusual behaviors.

## 2020-05-05 ENCOUNTER — PATIENT MESSAGE (OUTPATIENT)
Dept: ADMINISTRATIVE | Facility: HOSPITAL | Age: 70
End: 2020-05-05

## 2020-05-29 ENCOUNTER — OFFICE VISIT (OUTPATIENT)
Dept: FAMILY MEDICINE | Facility: CLINIC | Age: 70
End: 2020-05-29
Payer: MEDICARE

## 2020-05-29 VITALS
RESPIRATION RATE: 20 BRPM | DIASTOLIC BLOOD PRESSURE: 86 MMHG | HEART RATE: 83 BPM | OXYGEN SATURATION: 96 % | SYSTOLIC BLOOD PRESSURE: 139 MMHG | WEIGHT: 231.94 LBS | HEIGHT: 72 IN | TEMPERATURE: 98 F | BODY MASS INDEX: 31.42 KG/M2

## 2020-05-29 DIAGNOSIS — R06.2 WHEEZING: ICD-10-CM

## 2020-05-29 DIAGNOSIS — J45.21 MILD INTERMITTENT REACTIVE AIRWAY DISEASE WITH ACUTE EXACERBATION: Primary | ICD-10-CM

## 2020-05-29 DIAGNOSIS — R05.9 COUGH: ICD-10-CM

## 2020-05-29 PROCEDURE — 99214 PR OFFICE/OUTPT VISIT, EST, LEVL IV, 30-39 MIN: ICD-10-PCS | Mod: S$GLB,,, | Performed by: NURSE PRACTITIONER

## 2020-05-29 PROCEDURE — 99214 OFFICE O/P EST MOD 30 MIN: CPT | Mod: S$GLB,,, | Performed by: NURSE PRACTITIONER

## 2020-05-29 RX ORDER — PROMETHAZINE HYDROCHLORIDE AND DEXTROMETHORPHAN HYDROBROMIDE 6.25; 15 MG/5ML; MG/5ML
5 SYRUP ORAL EVERY 4 HOURS PRN
Qty: 240 ML | Refills: 0 | Status: SHIPPED | OUTPATIENT
Start: 2020-05-29 | End: 2020-06-08

## 2020-05-29 RX ORDER — LEVOFLOXACIN 500 MG/1
500 TABLET, FILM COATED ORAL DAILY
Qty: 10 TABLET | Refills: 0 | Status: SHIPPED | OUTPATIENT
Start: 2020-05-29 | End: 2020-06-08

## 2020-05-29 RX ORDER — PREDNISONE 20 MG/1
TABLET ORAL
Qty: 10 TABLET | Refills: 0 | Status: SHIPPED | OUTPATIENT
Start: 2020-05-29 | End: 2020-06-26

## 2020-05-29 NOTE — PROGRESS NOTES
Subjective:       Patient ID: Chris Quiroz is a 69 y.o. male.    Chief Complaint: Cough    HPI onset 4 years ago, comes and goes. Now back and all day constant. Having trouble catching his breath. Taking benadryl daily. claritin daily. Feel this is cough from PND. States a constant runny nose, PND. He moved here from [place] due to these symptoms and now he is getting more allergy flare ups here. He denies any fever. States more wheezing. States he chokes on the PND.  Cough is productive at times, states green mucous. Mild headache. See ROS.    The following portion of the patients history was reviewed and updated as appropriate: allergies, current medications, past medical and surgical history. Past social history and problem list reviewed. Family PMH and Past social history reviewed. Tobacco, Illicit drug use reviewed.      Review of patient's allergies indicates:   Allergen Reactions    Juniper Shortness Of Breath     States lungs fill up with fluid    Cat dander          Current Outpatient Medications:     acetaminophen (TYLENOL EXTRA STRENGTH) 500 MG tablet, Take 500 mg by mouth every 6 (six) hours as needed for Pain., Disp: , Rfl:     diphenhydrAMINE (BENADRYL) 25 mg capsule, Take 25 mg by mouth daily as needed for Allergies (reports approximately three times a week)., Disp: , Rfl:     glucosamine-chondroitin 500-400 mg tablet, Take 1 tablet by mouth as needed. , Disp: , Rfl:     hydrOXYzine pamoate (VISTARIL) 25 MG Cap, Take 1 Q D PRN for anxiety and 1-2 Q HS PRN for anxiety/sleep, Disp: 90 capsule, Rfl: 1    melatonin 5 mg Tab, Take 2 mg by mouth nightly. , Disp: , Rfl:     rosuvastatin (CRESTOR) 10 MG tablet, Take 1 tablet (10 mg total) by mouth 3 (three) times a week., Disp: 36 tablet, Rfl: 3    vortioxetine (TRINTELLIX) 10 mg Tab, Take 1 tablet (10 mg total) by mouth once daily., Disp: 90 tablet, Rfl: 0    Past Medical History:   Diagnosis Date    Anxiety     Atherosclerosis of coronary  artery     per CT scan dated 7/13/18    Depression     Diverticulosis of intestine without bleeding     GERD (gastroesophageal reflux disease)     Hyperlipidemia     Hypertension     Plantar fasciitis     Thoracic aorta atherosclerosis     per CT dated 7/13/18. sent for scanning    Vitamin D deficiency        Past Surgical History:   Procedure Laterality Date    COLONOSCOPY      ESOPHAGOGASTRODUODENOSCOPY Left 12/7/2018    Procedure: EGD (ESOPHAGOGASTRODUODENOSCOPY);  Surgeon: Josiah Cuadra MD;  Location: [facility];  Service: Endoscopy;  Laterality: Left;       Social History     Socioeconomic History    Marital status:      Spouse name: Not on file    Number of children: Not on file    Years of education: Not on file    Highest education level: Not on file   Occupational History    Not on file   Social Needs    Financial resource strain: Not on file    Food insecurity:     Worry: Not on file     Inability: Not on file    Transportation needs:     Medical: Not on file     Non-medical: Not on file   Tobacco Use    Smoking status: Former Smoker    Smokeless tobacco: Never Used   Substance and Sexual Activity    Alcohol use: Not Currently     Frequency: Never     Comment: once in a while    Drug use: No     Comment: Tried  CBD     Sexual activity: Never   Lifestyle    Physical activity:     Days per week: Not on file     Minutes per session: Not on file    Stress: Not on file   Relationships    Social connections:     Talks on phone: Not on file     Gets together: Not on file     Attends Restorationist service: Not on file     Active member of club or organization: Not on file     Attends meetings of clubs or organizations: Not on file     Relationship status: Not on file   Other Topics Concern    Not on file   Social History Narrative    Not on file       Review of Systems   Constitutional: Negative for fatigue and fever.   HENT: Positive for postnasal drip, rhinorrhea, sinus  pressure, sinus pain and sore throat.    Respiratory: Positive for cough (productive with yellow mucous), shortness of breath and wheezing. Negative for chest tightness.    Cardiovascular: Negative for chest pain and palpitations.   Gastrointestinal: Negative for abdominal pain, diarrhea, nausea and vomiting.   Musculoskeletal: Negative for arthralgias, back pain and gait problem.   Neurological: Positive for headaches. Negative for light-headedness.       Objective:      /86   Pulse 83   Temp 97.5 °F (36.4 °C) (Oral)   Resp 20   Ht 6' (1.829 m)   Wt 105.2 kg (231 lb 14.8 oz)   SpO2 96%   BMI 31.45 kg/m²      Physical Exam   Constitutional: He is oriented to person, place, and time. He appears well-developed and well-nourished. No distress.   HENT:   Head: Normocephalic and atraumatic.   Right Ear: Tympanic membrane, external ear and ear canal normal.   Left Ear: Tympanic membrane, external ear and ear canal normal.   Nose: Rhinorrhea present. Right sinus exhibits no maxillary sinus tenderness. Left sinus exhibits no maxillary sinus tenderness.   Mouth/Throat: Uvula is midline and mucous membranes are normal. Posterior oropharyngeal erythema present. No oropharyngeal exudate.   Eyes: Pupils are equal, round, and reactive to light. Conjunctivae are normal. Right eye exhibits no discharge. Left eye exhibits no discharge.   Neck: Normal range of motion. Neck supple. No thyromegaly present.   Cardiovascular: Normal rate, regular rhythm and normal heart sounds. Exam reveals no gallop.   No murmur heard.  Pulses:       Radial pulses are 2+ on the right side, and 2+ on the left side.   Pulmonary/Chest: Effort normal. No respiratory distress. He has wheezes (end exp wheezing). He has no rales. He exhibits no tenderness.   Abdominal: Soft. Bowel sounds are normal. He exhibits no distension. There is no tenderness. There is no rebound and no guarding.   Musculoskeletal: Normal range of motion. He exhibits no  edema.   Gait and coordination normal.  strong, equal. Upper and lower extremity strength normal.    Lymphadenopathy:     He has no cervical adenopathy.   Neurological: He is alert and oriented to person, place, and time. He has normal strength.   Skin: Skin is warm and dry. Capillary refill takes less than 2 seconds. No rash noted. He is not diaphoretic.   Psychiatric: He has a normal mood and affect. His speech is normal and behavior is normal.   Nursing note and vitals reviewed.      Assessment:       1. Mild intermittent reactive airway disease with acute exacerbation    2. Wheezing    3. Cough        Plan:       Mild intermittent reactive airway disease with acute exacerbation: steroid taper, take as directed. Due to discolored mucous will start on levaquin. Has history of CAP.  He refuses to have CXR done at this time. He will have it done if not improving. He denies any exposure to Covid and does not want testing.    Wheezing: steroid taper, take as directed. Needs to be extra cautious and follow covid restrictions. If symptoms worsen in any way needs to let me know.    Cough: will give cough medication to use as needed. Do not take and drive if symptoms do not improve.    Other orders  -     predniSONE (DELTASONE) 20 MG tablet; Take 2 tablets for 3 days, then one for 3 days then 1/2 for 2 days  Dispense: 10 tablet; Refill: 0  -     levoFLOXacin (LEVAQUIN) 500 MG tablet; Take 1 tablet (500 mg total) by mouth once daily. for 10 days  Dispense: 10 tablet; Refill: 0  -     promethazine-dextromethorphan (PROMETHAZINE-DM) 6.25-15 mg/5 mL Syrp; Take 5 mLs by mouth every 4 (four) hours as needed.  Dispense: 240 mL; Refill: 0     Continue current medication  Take medications only as prescribed  Healthy diet, exercise  Adequate rest  Adequate hydration  Avoid allergens  Avoid excessive caffeine     follow up if not improving

## 2020-06-03 ENCOUNTER — OFFICE VISIT (OUTPATIENT)
Dept: HEMATOLOGY/ONCOLOGY | Facility: CLINIC | Age: 70
End: 2020-06-03
Payer: MEDICARE

## 2020-06-03 ENCOUNTER — LAB VISIT (OUTPATIENT)
Dept: LAB | Facility: HOSPITAL | Age: 70
End: 2020-06-03
Attending: INTERNAL MEDICINE
Payer: MEDICARE

## 2020-06-03 DIAGNOSIS — D69.6 THROMBOCYTOPENIA: ICD-10-CM

## 2020-06-03 DIAGNOSIS — D64.9 ANEMIA, UNSPECIFIED TYPE: ICD-10-CM

## 2020-06-03 LAB
ALBUMIN SERPL BCP-MCNC: 3.7 G/DL (ref 3.5–5.2)
ALP SERPL-CCNC: 61 U/L (ref 38–145)
ALT SERPL W/O P-5'-P-CCNC: 20 U/L (ref 0–50)
ANION GAP SERPL CALC-SCNC: 4 MMOL/L (ref 8–16)
AST SERPL-CCNC: 28 U/L (ref 17–59)
BASOPHILS # BLD AUTO: 0.1 K/UL (ref 0–0.2)
BASOPHILS NFR BLD: 0.9 % (ref 0–1.9)
BILIRUB SERPL-MCNC: 0.8 MG/DL (ref 0.2–1.3)
BUN SERPL-MCNC: 21 MG/DL (ref 9–21)
CALCIUM SERPL-MCNC: 9.2 MG/DL (ref 8.4–10.2)
CHLORIDE SERPL-SCNC: 103 MMOL/L (ref 95–110)
CO2 SERPL-SCNC: 33 MMOL/L (ref 22–31)
CREAT SERPL-MCNC: 0.91 MG/DL (ref 0.5–1.4)
DIFFERENTIAL METHOD: ABNORMAL
EOSINOPHIL # BLD AUTO: 0.3 K/UL (ref 0–0.5)
EOSINOPHIL NFR BLD: 2.4 % (ref 0–8)
ERYTHROCYTE [DISTWIDTH] IN BLOOD BY AUTOMATED COUNT: 13.7 % (ref 11.5–14.5)
EST. GFR  (AFRICAN AMERICAN): >60 ML/MIN/1.73 M^2
EST. GFR  (NON AFRICAN AMERICAN): >60 ML/MIN/1.73 M^2
GLUCOSE SERPL-MCNC: 108 MG/DL (ref 70–110)
HCT VFR BLD AUTO: 42 % (ref 40–54)
HGB BLD-MCNC: 13.4 G/DL (ref 14–18)
IMM GRANULOCYTES # BLD AUTO: 0.09 K/UL (ref 0–0.04)
IMM GRANULOCYTES NFR BLD AUTO: 0.8 % (ref 0–0.5)
LYMPHOCYTES # BLD AUTO: 2.2 K/UL (ref 1–4.8)
LYMPHOCYTES NFR BLD: 20.3 % (ref 18–48)
MCH RBC QN AUTO: 29.4 PG (ref 27–31)
MCHC RBC AUTO-ENTMCNC: 31.9 G/DL (ref 32–36)
MCV RBC AUTO: 92 FL (ref 82–98)
MONOCYTES # BLD AUTO: 0.8 K/UL (ref 0.3–1)
MONOCYTES NFR BLD: 7 % (ref 4–15)
NEUTROPHILS # BLD AUTO: 7.4 K/UL (ref 1.8–7.7)
NEUTROPHILS NFR BLD: 68.6 % (ref 38–73)
NRBC BLD-RTO: 0 /100 WBC
PLATELET # BLD AUTO: 172 K/UL (ref 150–350)
PMV BLD AUTO: 11 FL (ref 9.2–12.9)
POTASSIUM SERPL-SCNC: 3.9 MMOL/L (ref 3.5–5.1)
PROT SERPL-MCNC: 6.7 G/DL (ref 6–8.4)
RBC # BLD AUTO: 4.56 M/UL (ref 4.6–6.2)
SODIUM SERPL-SCNC: 140 MMOL/L (ref 136–145)
WBC # BLD AUTO: 10.86 K/UL (ref 3.9–12.7)

## 2020-06-03 PROCEDURE — 85025 COMPLETE CBC W/AUTO DIFF WBC: CPT

## 2020-06-03 PROCEDURE — 36415 COLL VENOUS BLD VENIPUNCTURE: CPT | Mod: PN

## 2020-06-03 PROCEDURE — 80053 COMPREHEN METABOLIC PANEL: CPT

## 2020-06-03 PROCEDURE — 85025 COMPLETE CBC W/AUTO DIFF WBC: CPT | Mod: PN

## 2020-06-03 PROCEDURE — 99213 PR OFFICE/OUTPT VISIT, EST, LEVL III, 20-29 MIN: ICD-10-PCS | Mod: 95,,, | Performed by: INTERNAL MEDICINE

## 2020-06-03 PROCEDURE — 99213 OFFICE O/P EST LOW 20 MIN: CPT | Mod: 95,,, | Performed by: INTERNAL MEDICINE

## 2020-06-03 PROCEDURE — 80053 COMPREHEN METABOLIC PANEL: CPT | Mod: PN

## 2020-06-03 NOTE — PROGRESS NOTES
The patient location is: home  The chief complaint leading to consultation is:  Anemia    Visit type: audiovisual    Face to Face time with patient: 10  20 minutes of total time spent on the encounter, which includes face to face time and non-face to face time preparing to see the patient (eg, review of tests), Obtaining and/or reviewing separately obtained history, Documenting clinical information in the electronic or other health record, Independently interpreting results (not separately reported) and communicating results to the patient/family/caregiver, or Care coordination (not separately reported).         Each patient to whom he or she provides medical services by telemedicine is:  (1) informed of the relationship between the physician and patient and the respective role of any other health care provider with respect to management of the patient; and (2) notified that he or she may decline to receive medical services by telemedicine and may withdraw from such care at any time.    Notes:       HPI    69 years old patient with mild anemia and thrombocytopenia.  Denies overt bleeding.  Denies bruises.  Patient has history of anxiety coronary artery disease depression diverticulosis acid reflux disease and hypertension.    Currently denies any chest pain shortness breath.  Denies any abdominal pain nausea vomiting or diarrhea.  No fever no chills. upper EGD on 12/07/2018 and colonoscopy 04/13/2018        Past Medical History:   Diagnosis Date    Anxiety     Atherosclerosis of coronary artery     per CT scan dated 7/13/18    Depression     Diverticulosis of intestine without bleeding     GERD (gastroesophageal reflux disease)     Hyperlipidemia     Hypertension     Plantar fasciitis     Thoracic aorta atherosclerosis     per CT dated 7/13/18. sent for scanning    Vitamin D deficiency      Social History     Socioeconomic History    Marital status:      Spouse name: Not on file    Number of  children: Not on file    Years of education: Not on file    Highest education level: Not on file   Occupational History    Not on file   Social Needs    Financial resource strain: Not on file    Food insecurity:     Worry: Not on file     Inability: Not on file    Transportation needs:     Medical: Not on file     Non-medical: Not on file   Tobacco Use    Smoking status: Former Smoker    Smokeless tobacco: Never Used   Substance and Sexual Activity    Alcohol use: Not Currently     Frequency: Never     Comment: once in a while    Drug use: No     Comment: Tried  CBD     Sexual activity: Never   Lifestyle    Physical activity:     Days per week: Not on file     Minutes per session: Not on file    Stress: Not on file   Relationships    Social connections:     Talks on phone: Not on file     Gets together: Not on file     Attends Confucianism service: Not on file     Active member of club or organization: Not on file     Attends meetings of clubs or organizations: Not on file     Relationship status: Not on file   Other Topics Concern    Not on file   Social History Narrative    Not on file         Subjective      Review of Systems   Constitutional: Negative for appetite change, fatigue and unexpected weight change.   HENT: Negative for mouth sores.   Eyes: Negative for visual disturbance.   Respiratory: Negative for cough and shortness of breath.   Cardiovascular: Negative for chest pain.   Gastrointestinal: Negative for diarrhea.   Genitourinary: Negative for frequency.   Musculoskeletal: Negative for back pain.   Skin: Negative for rash.   Neurological: Negative for headaches.   Hematological: Negative for adenopathy.   Psychiatric/Behavioral: The patient is not nervous/anxious.   All other systems reviewed and are negative.     Objective    Physical Exam   Virtual visit  COVID-19 percussion measure  CMP  Sodium   Date Value Ref Range Status   06/03/2020 140 136 - 145 mmol/L Final     Potassium   Date  Value Ref Range Status   06/03/2020 3.9 3.5 - 5.1 mmol/L Final     Chloride   Date Value Ref Range Status   06/03/2020 103 95 - 110 mmol/L Final     CO2   Date Value Ref Range Status   06/03/2020 33 (H) 22 - 31 mmol/L Final     Glucose   Date Value Ref Range Status   06/03/2020 108 70 - 110 mg/dL Final     Comment:     The ADA recommends the following guidelines for fasting glucose:  Normal:       less than 100 mg/dL  Prediabetes:  100 mg/dL to 125 mg/dL  Diabetes:     126 mg/dL or higher       BUN, Bld   Date Value Ref Range Status   06/03/2020 21 9 - 21 mg/dL Final     Creatinine   Date Value Ref Range Status   06/03/2020 0.91 0.50 - 1.40 mg/dL Final     Calcium   Date Value Ref Range Status   06/03/2020 9.2 8.4 - 10.2 mg/dL Final     Total Protein   Date Value Ref Range Status   06/03/2020 6.7 6.0 - 8.4 g/dL Final     Albumin   Date Value Ref Range Status   06/03/2020 3.7 3.5 - 5.2 g/dL Final     Total Bilirubin   Date Value Ref Range Status   06/03/2020 0.8 0.2 - 1.3 mg/dL Final     Alkaline Phosphatase   Date Value Ref Range Status   06/03/2020 61 38 - 145 U/L Final     AST   Date Value Ref Range Status   06/03/2020 28 17 - 59 U/L Final     ALT   Date Value Ref Range Status   06/03/2020 20 0 - 50 U/L Final     Anion Gap   Date Value Ref Range Status   06/03/2020 4 (L) 8 - 16 mmol/L Final     eGFR if    Date Value Ref Range Status   06/03/2020 >60 >60 mL/min/1.73 m^2 Final     eGFR if non    Date Value Ref Range Status   06/03/2020 >60 >60 mL/min/1.73 m^2 Final     Comment:     Calculation used to obtain the estimated glomerular filtration  rate (eGFR) is the CKD-EPI equation.        Lab Results   Component Value Date    WBC 10.86 06/03/2020    HGB 13.4 (L) 06/03/2020    HCT 42.0 06/03/2020    MCV 92 06/03/2020     06/03/2020             Assessment plan      [] Mild anemia and mild thrombocytopenia   > review of blood work today patient may follow-up primary care physician.   Follow-up hematology clinic p.r.n..  > no further recommendation at this point    [] Reviewed medication with patient    [] all other medical condition followed by primary care physician      There are no diagnoses linked to this encounter.

## 2020-06-24 ENCOUNTER — OFFICE VISIT (OUTPATIENT)
Dept: PSYCHIATRY | Facility: CLINIC | Age: 70
End: 2020-06-24
Payer: MEDICARE

## 2020-06-24 VITALS
BODY MASS INDEX: 31.69 KG/M2 | SYSTOLIC BLOOD PRESSURE: 181 MMHG | DIASTOLIC BLOOD PRESSURE: 103 MMHG | HEIGHT: 72 IN | WEIGHT: 234 LBS | HEART RATE: 83 BPM

## 2020-06-24 DIAGNOSIS — F33.1 MODERATE EPISODE OF RECURRENT MAJOR DEPRESSIVE DISORDER: Primary | ICD-10-CM

## 2020-06-24 DIAGNOSIS — F10.21 ALCOHOL USE DISORDER, SEVERE, IN EARLY REMISSION: ICD-10-CM

## 2020-06-24 PROCEDURE — 99213 OFFICE O/P EST LOW 20 MIN: CPT | Mod: PBBFAC,PO | Performed by: PSYCHOLOGIST

## 2020-06-24 PROCEDURE — 90833 PR PSYCHOTHERAPY W/PATIENT W/E&M, 30 MIN (ADD ON): ICD-10-PCS | Mod: S$PBB,,, | Performed by: PSYCHOLOGIST

## 2020-06-24 PROCEDURE — 99214 OFFICE O/P EST MOD 30 MIN: CPT | Mod: S$PBB,,, | Performed by: PSYCHOLOGIST

## 2020-06-24 PROCEDURE — 99999 PR PBB SHADOW E&M-EST. PATIENT-LVL III: ICD-10-PCS | Mod: PBBFAC,,, | Performed by: PSYCHOLOGIST

## 2020-06-24 PROCEDURE — 99999 PR PBB SHADOW E&M-EST. PATIENT-LVL III: CPT | Mod: PBBFAC,,, | Performed by: PSYCHOLOGIST

## 2020-06-24 PROCEDURE — 99214 PR OFFICE/OUTPT VISIT, EST, LEVL IV, 30-39 MIN: ICD-10-PCS | Mod: S$PBB,,, | Performed by: PSYCHOLOGIST

## 2020-06-24 PROCEDURE — 90833 PSYTX W PT W E/M 30 MIN: CPT | Mod: S$PBB,,, | Performed by: PSYCHOLOGIST

## 2020-06-24 RX ORDER — VORTIOXETINE 20 MG/1
20 TABLET, FILM COATED ORAL DAILY
Qty: 30 TABLET | Refills: 2 | Status: SHIPPED | OUTPATIENT
Start: 2020-06-24 | End: 2020-08-24 | Stop reason: SDUPTHER

## 2020-06-24 NOTE — PROGRESS NOTES
The patient location is: home  The chief complaint leading to consultation is: depression, alcoholism, anxiety with panic  Visit type: Virtual visit with synchronous audio and video  Total time spent with patient: 20 minutes  Each patient to whom he or she provides medical services by telemedicine is:  (1) informed of the relationship between the physician and patient and the respective role of any other health care provider with respect to management of the patient; and (2) notified that he or she may decline to receive medical services by telemedicine and may withdraw from such care at any time.    Notes:      Outpatient Psychiatry Follow-Up Visit    Clinical Status of Patient: Outpatient (Ambulatory)  06/24/2020     Chief Complaint: 69 year old male presenting today for a follow-up.       Interval History and Content of Current Session:  Interim Events/Subjective Report/Content of Current Session:  follow-up appointment.    Pt is a 69 year old male with past psychiatric hx of depression, alcoholism, anxiety with panic who presents for follow-up treatment. Pt reported inconsistent mood since the last session. Pt said that he has some great days and some very low days. Pt said that before Trintellix he would have no good days, so he sees this as an improvement. Pt shared that he has been feeling foggy lately and we discovered that he is taking multiple antihistamines. Atarax will be discontinued. Pt complained of very poor motivation. Pt said that he feels very highly anxious today and believes this is why his BP is so high. Pt said that he will monitor at home and this author looked at previous MD office visits which were not elevated. Pt referred to counseling and is amenable to this referral.     Past Psychiatric hx: inpt for polysubstance in 1991 and prior treatment with Zoloft, Xanax, Lamictal, Wellbutrin, trazodone, Paxil, Effexor, Elavil, Cymbalta, Lexapro, Celexa, and Prozac    Past Medical hx:   Past  Medical History:   Diagnosis Date    Anxiety     Atherosclerosis of coronary artery     per CT scan dated 7/13/18    Depression     Diverticulosis of intestine without bleeding     GERD (gastroesophageal reflux disease)     Hyperlipidemia     Hypertension     Plantar fasciitis     Thoracic aorta atherosclerosis     per CT dated 7/13/18. sent for scanning    Vitamin D deficiency         Interim hx:  Medication changes last visit: started Atarax 25mg Q D PRN and 50mg Q HS PRN  Anxiety: moderate - variable  Depression: moderate - variable     Denies suicidal/homicidal ideations.  Denies hopelessness/worthlessness.    Denies auditory/visual hallucinations      Alcohol:  Pt is sober  Drug: THC use  Caffeine: minimal use  Tobacco: pt denied      Review of Systems   · PSYCHIATRIC: Pertinent items are noted in the narrative.        CONSTITUTIONAL: weight stable     Past Medical, Family and Social History: The patient's past medical, family and social history have been reviewed and updated as appropriate within the electronic medical record. See encounter notes.     Current Psychiatric Medication:  Trintellix 10mg once daily and Atarax 25mg Q D PRN and 50mg Q HS PRN     Compliance: yes      Side effects: pt denied     Risk Parameters:  Patient reports no suicidal ideation  Patient reports no homicidal ideation  Patient reports no self-injurious behavior  Patient reports no violent behavior     Exam (detailed: at least 9 elements; comprehensive: all 15 elements)   Constitutional  Vitals:  Most recent vital signs, dated less than 90 days prior to this appointment, were reviewed. Pulse:  [83]   BP: (181)/(103)       General:  unremarkable, age appropriate, casual attire, good eye contact, good rapport       Musculoskeletal  Muscle Strength/Tone:  no flaccidity, no tremor    Gait & Station:  normal      Psychiatric                       Speech:  normal tone, normal rate, rhythm, and volume   Mood & Affect:   Depressed,  anxious         Thought Process:   Goal directed; Linear    Associations:   intact   Thought Content:   No SI/HI, delusions, or paranoia, no AV/VH   Insight & Judgement:   Good, adequate to circumstances   Orientation:   grossly intact; alert and oriented x 4    Memory:  intact for content of interview    Language:  grossly intact, can repeat    Attention Span  : Grossly intact for content of interview   Fund of Knowledge:   intact and appropriate to age and level of education        Assessment and Diagnosis   Status/Progress: improving     Impression: Pt suffers from recurrent depression and panic attacks. Pt has a long history of alcohol dependence with a recent relapse and a history of cocaine abuse. Pt is concerned about his memory decline but this may be related to anxiety about his health.     Diagnosis: 1) Major Depressive Disorder, recurrent, moderate    2) Alcohol Use Disorder, severe, in current remission    Intervention/Counseling/Treatment Plan   · Medication Management:      1. Increase Trintellix dosage to 20mg Q D     2. Stop Atarax     3. Do not use drugs/alcohol    4. Start psychotherapy - put on waitlist in our clinic     5. Call to report any worsening of symptoms or problems with the medication. Pt instructed to go to ER with thoughts of harming self, others    Psychotherapy:   · Target symptoms: depression, anxiety  · Why chosen therapy is appropriate versus another modality: CBT used; relevant to diagnosis, patient responds to this modality  · Outcome monitoring methods: self-report, observation  · Therapeutic intervention type: Cognitive Behavioral Therapy  · Topics discussed/themes: building skills sets for symptom management, symptom recognition, nutrition, exercise  · The patient's response to the intervention is good  · Patient's response to treatment is: good.   · The patient's progress toward treatment goals: improving  · Duration of intervention: 20 minutes     Return to clinic: 2  months    -Spent 20min face to face with the pt; >50% time spent in counseling   -Cognitive-Behavioral/Supportive therapy and psychoeducation provided  -R/B/SE's of medications discussed with the pt who expresses understanding and chooses to take medications as prescribed.   -Pt instructed to call clinic, 911 or go to nearest emergency room if sxs worsen or pt is in   crisis. The pt expresses understanding.    Thomas Temple, PhD, MP

## 2020-06-26 ENCOUNTER — CLINICAL SUPPORT (OUTPATIENT)
Dept: URGENT CARE | Facility: CLINIC | Age: 70
End: 2020-06-26
Payer: MEDICARE

## 2020-06-26 ENCOUNTER — OFFICE VISIT (OUTPATIENT)
Dept: FAMILY MEDICINE | Facility: CLINIC | Age: 70
End: 2020-06-26
Payer: MEDICARE

## 2020-06-26 VITALS
OXYGEN SATURATION: 98 % | TEMPERATURE: 99 F | SYSTOLIC BLOOD PRESSURE: 144 MMHG | HEART RATE: 74 BPM | DIASTOLIC BLOOD PRESSURE: 86 MMHG | HEIGHT: 72 IN | BODY MASS INDEX: 30.87 KG/M2 | WEIGHT: 227.94 LBS

## 2020-06-26 DIAGNOSIS — H91.93 DECREASED HEARING OF BOTH EARS: ICD-10-CM

## 2020-06-26 DIAGNOSIS — J18.9 PNEUMONIA DUE TO INFECTIOUS ORGANISM, UNSPECIFIED LATERALITY, UNSPECIFIED PART OF LUNG: ICD-10-CM

## 2020-06-26 DIAGNOSIS — R05.9 COUGH: ICD-10-CM

## 2020-06-26 DIAGNOSIS — R06.02 SOB (SHORTNESS OF BREATH): Primary | ICD-10-CM

## 2020-06-26 PROCEDURE — U0003 INFECTIOUS AGENT DETECTION BY NUCLEIC ACID (DNA OR RNA); SEVERE ACUTE RESPIRATORY SYNDROME CORONAVIRUS 2 (SARS-COV-2) (CORONAVIRUS DISEASE [COVID-19]), AMPLIFIED PROBE TECHNIQUE, MAKING USE OF HIGH THROUGHPUT TECHNOLOGIES AS DESCRIBED BY CMS-2020-01-R: HCPCS

## 2020-06-26 PROCEDURE — 99214 PR OFFICE/OUTPT VISIT, EST, LEVL IV, 30-39 MIN: ICD-10-PCS | Mod: S$GLB,,, | Performed by: NURSE PRACTITIONER

## 2020-06-26 PROCEDURE — 99214 OFFICE O/P EST MOD 30 MIN: CPT | Mod: S$GLB,,, | Performed by: NURSE PRACTITIONER

## 2020-06-26 RX ORDER — FLUTICASONE PROPIONATE AND SALMETEROL 250; 50 UG/1; UG/1
1 POWDER RESPIRATORY (INHALATION) 2 TIMES DAILY
Qty: 60 EACH | Refills: 2 | Status: SHIPPED | OUTPATIENT
Start: 2020-06-26 | End: 2020-06-30 | Stop reason: SDUPTHER

## 2020-06-26 NOTE — PROGRESS NOTES
Subjective:       Patient ID: Chris Quiroz is a 69 y.o. male.    Chief Complaint: Cough    HPI he was seen a month ago for cough and treated with steroid pack and levaquin. States he got better but still with some SOB and wheezing. He is concerned that he had covid and would like to be tested. He denies any fever. He has chest tightness, worse at night.     He states that his hearing is getting worse and would like to have his hearing tested.     He is seeing psychiatry for his depression and feels that is doing better.     He has no other concerns. See ROS.    The following portion of the patients history was reviewed and updated as appropriate: allergies, current medications, past medical and surgical history. Past social history and problem list reviewed. Family PMH and Past social history reviewed. Tobacco, Illicit drug use reviewed.      Review of patient's allergies indicates:   Allergen Reactions    Juniper Shortness Of Breath     States lungs fill up with fluid    Cat dander          Current Outpatient Medications:     acetaminophen (TYLENOL EXTRA STRENGTH) 500 MG tablet, Take 500 mg by mouth every 6 (six) hours as needed for Pain., Disp: , Rfl:     diphenhydrAMINE (BENADRYL) 25 mg capsule, Take 25 mg by mouth daily as needed for Allergies (reports approximately three times a week)., Disp: , Rfl:     glucosamine-chondroitin 500-400 mg tablet, Take 1 tablet by mouth as needed. , Disp: , Rfl:     vortioxetine (TRINTELLIX) 20 mg Tab, Take 1 tablet (20 mg total) by mouth once daily., Disp: 30 tablet, Rfl: 2    melatonin 5 mg Tab, Take 2 mg by mouth nightly. , Disp: , Rfl:     rosuvastatin (CRESTOR) 10 MG tablet, Take 1 tablet (10 mg total) by mouth 3 (three) times a week. (Patient not taking: Reported on 6/26/2020), Disp: 36 tablet, Rfl: 3    Past Medical History:   Diagnosis Date    Anxiety     Atherosclerosis of coronary artery     per CT scan dated 7/13/18    Depression     Diverticulosis  of intestine without bleeding     GERD (gastroesophageal reflux disease)     Hyperlipidemia     Hypertension     Plantar fasciitis     Thoracic aorta atherosclerosis     per CT dated 7/13/18. sent for scanning    Vitamin D deficiency        Past Surgical History:   Procedure Laterality Date    COLONOSCOPY      ESOPHAGOGASTRODUODENOSCOPY Left 12/7/2018    Procedure: EGD (ESOPHAGOGASTRODUODENOSCOPY);  Surgeon: Josiah Cuadra MD;  [facility];  Service: Endoscopy;  Laterality: Left;       Social History     Socioeconomic History    Marital status:      Spouse name: Not on file    Number of children: Not on file    Years of education: Not on file    Highest education level: Not on file   Occupational History    Not on file   Social Needs    Financial resource strain: Not on file    Food insecurity     Worry: Not on file     Inability: Not on file    Transportation needs     Medical: Not on file     Non-medical: Not on file   Tobacco Use    Smoking status: Former Smoker    Smokeless tobacco: Never Used   Substance and Sexual Activity    Alcohol use: Not Currently     Frequency: Never     Comment: once in a while    Drug use: No     Comment: Tried  CBD     Sexual activity: Never   Lifestyle    Physical activity     Days per week: Not on file     Minutes per session: Not on file    Stress: Not on file   Relationships    Social connections     Talks on phone: Not on file     Gets together: Not on file     Attends Jewish service: Not on file     Active member of club or organization: Not on file     Attends meetings of clubs or organizations: Not on file     Relationship status: Not on file   Other Topics Concern    Not on file   Social History Narrative    Not on file     Review of Systems   Constitutional: Negative for activity change, appetite change, fatigue and fever.   HENT: Positive for hearing loss, postnasal drip and rhinorrhea. Negative for congestion and trouble  swallowing.    Eyes: Negative for visual disturbance.   Respiratory: Positive for chest tightness, shortness of breath and wheezing. Negative for cough.    Cardiovascular: Negative for chest pain, palpitations and leg swelling.   Gastrointestinal: Negative for abdominal pain, blood in stool, diarrhea, nausea and vomiting.   Musculoskeletal: Positive for arthralgias. Negative for back pain and gait problem.   Skin: Negative for rash.   Neurological: Negative for dizziness, weakness and headaches.   Hematological: Negative for adenopathy. Does not bruise/bleed easily.   Psychiatric/Behavioral: Negative for dysphoric mood, self-injury, sleep disturbance and suicidal ideas. The patient is not nervous/anxious.        Objective:      BP (!) 144/86   Pulse 74   Temp 98.5 °F (36.9 °C) (Tympanic)   Ht 6' (1.829 m)   Wt 103.4 kg (227 lb 15.3 oz)   SpO2 98%   BMI 30.92 kg/m²      Physical Exam  Vitals signs and nursing note reviewed.   Constitutional:       General: He is not in acute distress.     Appearance: Normal appearance. He is well-developed. He is not diaphoretic.   HENT:      Head: Normocephalic and atraumatic.      Right Ear: Tympanic membrane, ear canal and external ear normal.      Left Ear: Tympanic membrane, ear canal and external ear normal.      Nose: Rhinorrhea present.      Mouth/Throat:      Mouth: Mucous membranes are moist.      Pharynx: Oropharynx is clear. No oropharyngeal exudate.   Eyes:      General:         Right eye: No discharge.         Left eye: No discharge.      Conjunctiva/sclera: Conjunctivae normal.      Pupils: Pupils are equal, round, and reactive to light.   Neck:      Musculoskeletal: Normal range of motion and neck supple.      Thyroid: No thyromegaly.      Vascular: No JVD.   Cardiovascular:      Rate and Rhythm: Normal rate and regular rhythm.      Pulses:           Radial pulses are 2+ on the right side and 2+ on the left side.      Heart sounds: Normal heart sounds. No  murmur. No gallop.    Pulmonary:      Effort: Pulmonary effort is normal. No respiratory distress.      Breath sounds: Examination of the right-lower field reveals rales. Wheezing and rales present.   Chest:      Chest wall: No tenderness.   Abdominal:      General: Bowel sounds are normal. There is no distension.      Palpations: Abdomen is soft.      Tenderness: There is no abdominal tenderness. There is no guarding or rebound.   Musculoskeletal: Normal range of motion.      Comments: Gait and coordination normal.  strong, equal. Upper and lower extremity strength normal.    Lymphadenopathy:      Cervical: No cervical adenopathy.   Skin:     General: Skin is warm and dry.      Capillary Refill: Capillary refill takes less than 2 seconds.      Findings: No rash.   Neurological:      Mental Status: He is alert and oriented to person, place, and time.   Psychiatric:         Attention and Perception: Attention normal.         Mood and Affect: Mood normal.         Speech: Speech normal.         Behavior: Behavior normal.         Assessment:       1. SOB (shortness of breath)    2. Cough    3. Decreased hearing of both ears    4. Pneumonia due to infectious organism, unspecified laterality, unspecified part of lung        Plan:       SOB (shortness of breath): will get CXR. Will refer to Pulmonology. Will give inhaler.   -     Ambulatory referral/consult to Pulmonology; Future; Expected date: 07/03/2020    Cough  -     COVID-19 Routine Screening; Future; Expected date: 06/26/2020  -     X-Ray Chest PA And Lateral; Future; Expected date: 06/26/2020    Decreased hearing of both ears: will refer to audiology.   -     Ambulatory referral/consult to Audiology; Future; Expected date: 07/03/2020    Pneumonia due to infectious organism, unspecified laterality, unspecified part of lung: will verify per CXR, if positive will start on antibiotics.     Other orders  -     : fluticasone-salmeterol diskus inhaler 250-50 mcg;  Inhale 1 puff into the lungs 2 (two) times daily. Controller  Dispense: 60 each; Refill: 2       Continue current medication  Take medications only as prescribed  Healthy diet, exercise  Adequate rest  Adequate hydration  Avoid allergens  Avoid excessive caffeine     follow up one week.

## 2020-06-29 DIAGNOSIS — J18.9 PNEUMONIA OF BOTH LOWER LOBES DUE TO INFECTIOUS ORGANISM: Primary | ICD-10-CM

## 2020-06-29 RX ORDER — AZITHROMYCIN 250 MG/1
TABLET, FILM COATED ORAL
Qty: 6 TABLET | Refills: 0 | Status: SHIPPED | OUTPATIENT
Start: 2020-06-29 | End: 2020-07-04

## 2020-06-29 RX ORDER — AMOXICILLIN AND CLAVULANATE POTASSIUM 500; 125 MG/1; MG/1
1 TABLET, FILM COATED ORAL 2 TIMES DAILY
Qty: 20 TABLET | Refills: 0 | Status: SHIPPED | OUTPATIENT
Start: 2020-06-29 | End: 2020-08-24 | Stop reason: ALTCHOICE

## 2020-06-30 ENCOUNTER — TELEPHONE (OUTPATIENT)
Dept: FAMILY MEDICINE | Facility: CLINIC | Age: 70
End: 2020-06-30

## 2020-06-30 DIAGNOSIS — J45.21 MILD INTERMITTENT REACTIVE AIRWAY DISEASE WITH ACUTE EXACERBATION: Primary | ICD-10-CM

## 2020-06-30 RX ORDER — ALBUTEROL SULFATE 90 UG/1
2 AEROSOL, METERED RESPIRATORY (INHALATION) EVERY 6 HOURS PRN
Qty: 18 G | Refills: 1 | Status: SHIPPED | OUTPATIENT
Start: 2020-06-30 | End: 2022-11-08 | Stop reason: ALTCHOICE

## 2020-06-30 RX ORDER — FLUTICASONE PROPIONATE AND SALMETEROL 250; 50 UG/1; UG/1
1 POWDER RESPIRATORY (INHALATION) 2 TIMES DAILY
Qty: 60 EACH | Refills: 2 | Status: SHIPPED | OUTPATIENT
Start: 2020-06-30 | End: 2021-03-15 | Stop reason: ALTCHOICE

## 2020-06-30 NOTE — TELEPHONE ENCOUNTER
Received fax from pharmacy stating wixela is not covered by pts plan.  Attempt PA or change med?    Plan # [phone]  Pt ID # [id number]

## 2020-06-30 NOTE — TELEPHONE ENCOUNTER
I prescribed that last week, I hope he has not gone this long without it.  See if he got it.  If not then I will change it.

## 2020-06-30 NOTE — TELEPHONE ENCOUNTER
Patient has not picked up RX due to cost and is requesting a new comparable/substitue prescription be sent to pharmacy.

## 2020-07-02 ENCOUNTER — PATIENT MESSAGE (OUTPATIENT)
Dept: FAMILY MEDICINE | Facility: CLINIC | Age: 70
End: 2020-07-02

## 2020-07-02 LAB — SARS-COV-2 RNA RESP QL NAA+PROBE: NOT DETECTED

## 2020-07-30 ENCOUNTER — OFFICE VISIT (OUTPATIENT)
Dept: PSYCHIATRY | Facility: CLINIC | Age: 70
End: 2020-07-30
Payer: MEDICARE

## 2020-07-30 DIAGNOSIS — F32.A DEPRESSION, UNSPECIFIED DEPRESSION TYPE: Primary | ICD-10-CM

## 2020-07-30 DIAGNOSIS — F41.9 ANXIETY: ICD-10-CM

## 2020-07-30 PROCEDURE — 90791 PR PSYCHIATRIC DIAGNOSTIC EVALUATION: ICD-10-PCS | Mod: ,,, | Performed by: SOCIAL WORKER

## 2020-07-30 PROCEDURE — 90791 PSYCH DIAGNOSTIC EVALUATION: CPT | Mod: ,,, | Performed by: SOCIAL WORKER

## 2020-08-02 NOTE — PROGRESS NOTES
"Psychiatry Initial Visit (PhD/LCSW)  Diagnostic Interview - CPT 78534    Date: 7/30/2020    Site: [facility]  [facility], [place]    Referral source: Ryann Pham, KUMAR    Clinical status of patient: Outpatient    Chris Quiroz, a 69 y.o. male, for initial evaluation visit.  Met with patient.    Chief complaint/reason for encounter: anger    History of present illness: Reviewed chart.       Client reports that he has had rage attacks in the past when in his "fugue state" of creativity. 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 Clt misses being able to write but feels creative state also leads to poor mental health and rage for him. 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 Clt notes that he usually makes all of his friends when doing theatre-support he does have is ex-wife. Clt reads a lot. Clt notes that father had history of rage attacks while he was growing up but that eventually he stood up to him and they stopped towards him. Clt reports that he was in  school as well in the past. Clt reports that he was in an anger management group for 3 years while in [place] that met daily-enjoyed it. Clt notes that he is highly motivated for therapy.  Client interested in doing BSP.    Pain: noncontributory    Symptoms:   · Mood: depressed mood, diminished interest, fatigue and worthlessness/guilt  · Anxiety: excessive anxiety/worry, restlessness/keyed up and irritability  · Substance abuse: history of addiction  · Cognitive functioning: denied  · Health behaviors: noncontributory    Psychiatric history: has participated in counseling/psychotherapy on an outpatient basis in the past and currently under psychiatric care     Medical history:   Past " Medical History:   Diagnosis Date    Anxiety     Atherosclerosis of coronary artery     per CT scan dated 7/13/18    Depression     Diverticulosis of intestine without bleeding     GERD (gastroesophageal reflux disease)     Hyperlipidemia     Hypertension     Plantar fasciitis     Thoracic aorta atherosclerosis     per CT dated 7/13/18. sent for scanning    Vitamin D deficiency        Family history of psychiatric illness:   Family History   Problem Relation Age of Onset    Diabetes Mother     Depression Mother     Alcohol abuse Father     Cancer Father     Depression Father     Diabetes Sister     Cancer Sister     Cancer Brother     Mental illness Sister     Cancer Sister     Arthritis Sister     Cancer Sister        Social history (marriage, employment, etc.):   Social History     Tobacco Use    Smoking status: Former Smoker     Packs/day: 2.00    Smokeless tobacco: Never Used    Tobacco comment: Age Started: 22 Age Quit: 51   Substance Use Topics    Alcohol use: Not Currently     Frequency: Never     Comment: once in a while    Drug use: No     Comment: Tried  CBD        Current medications and drug reactions (include OTC, herbal): see medication list     Strengths and liabilities: Strength: Patient accepts guidance/feedback, Strength: Patient is expressive/articulate., Strength: Patient is intelligent., Strength: Patient is motivated for change., Strength: Patient is physically healthy., Strength: Patient has positive support network., Strength: Patient has reasonable judgment., Strength: Patient is stable.    Current Evaluation:     Mental Status Exam:  General Appearance:  unremarkable, age appropriate   Speech: normal tone, normal rate, normal pitch, normal volume      Level of Cooperation: cooperative      Thought Processes: normal and logical   Mood: steady      Thought Content: normal, no suicidality, no homicidality, delusions, or paranoia   Affect: congruent and appropriate    Orientation: Oriented x3   Memory: recent >  intact   Attention Span & Concentration: intact   Fund of General Knowledge: intact and appropriate to age and level of education   Abstract Reasoning: interpretation of similarities was abstract   Judgment & Insight: good     Language  intact     Diagnostic Impression - Plan:       ICD-10-CM ICD-9-CM   1. Depression, unspecified depression type  F32.9 311   2. Anxiety  F41.9 300.00       Plan:individual psychotherapy    Return to Clinic: 1 week    Length of Service (minutes): 45

## 2020-08-03 ENCOUNTER — TELEPHONE (OUTPATIENT)
Dept: AUDIOLOGY | Facility: CLINIC | Age: 70
End: 2020-08-03

## 2020-08-03 NOTE — TELEPHONE ENCOUNTER
LMOV asking patient to call us back so I can get him scheduled.    ----- Message from Princess WILLIAM Mcmillan sent at 8/3/2020 12:52 PM CDT -----  Contact: pt  Type: Needs Medical Advice  Who Called: pt  Best Call Back Number: [phone] (home)     Additional Information: patient has a referral to be seen. Please advise for the next available appt.

## 2020-08-06 ENCOUNTER — CLINICAL SUPPORT (OUTPATIENT)
Dept: AUDIOLOGY | Facility: CLINIC | Age: 70
End: 2020-08-06
Payer: MEDICARE

## 2020-08-06 DIAGNOSIS — H90.42 SENSORINEURAL HEARING LOSS (SNHL) OF LEFT EAR WITH UNRESTRICTED HEARING OF RIGHT EAR: ICD-10-CM

## 2020-08-06 PROCEDURE — 99212 OFFICE O/P EST SF 10 MIN: CPT | Mod: PBBFAC,PO

## 2020-08-06 PROCEDURE — 92557 COMPREHENSIVE HEARING TEST: CPT | Mod: PBBFAC,PO | Performed by: AUDIOLOGIST-HEARING AID FITTER

## 2020-08-06 PROCEDURE — 99999 PR PBB SHADOW E&M-EST. PATIENT-LVL II: CPT | Mod: PBBFAC,,,

## 2020-08-06 PROCEDURE — 92567 TYMPANOMETRY: CPT | Mod: PBBFAC,PO | Performed by: AUDIOLOGIST-HEARING AID FITTER

## 2020-08-06 PROCEDURE — 99999 PR PBB SHADOW E&M-EST. PATIENT-LVL II: ICD-10-PCS | Mod: PBBFAC,,,

## 2020-08-06 NOTE — Clinical Note
Hearing test results reveal normal hearing from 250-8000Hz bilaterally with the exception of a mild SNHL at 8K Hz AS. Recommend repeat hearing test if problems arise and hearing protection when around loud noises.
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
## 2020-08-07 ENCOUNTER — OFFICE VISIT (OUTPATIENT)
Dept: PSYCHIATRY | Facility: CLINIC | Age: 70
End: 2020-08-07
Payer: MEDICARE

## 2020-08-07 DIAGNOSIS — F41.9 ANXIETY: Primary | ICD-10-CM

## 2020-08-07 PROCEDURE — 90834 PSYTX W PT 45 MINUTES: CPT | Mod: 95,,, | Performed by: SOCIAL WORKER

## 2020-08-07 PROCEDURE — 90834 PR PSYCHOTHERAPY W/PATIENT, 45 MIN: ICD-10-PCS | Mod: 95,,, | Performed by: SOCIAL WORKER

## 2020-08-09 NOTE — PROGRESS NOTES
Individual Psychotherapy (PhD/LCSW)    Date: 8/7/2020    The patient location is: [address] [place] [id number]  The chief complaint leading to consultation is: anxiety  Visit type: Virtual visit with synchronous audio and video  Total time spent with patient: 45 min  Each patient to whom he or she provides medical services by telemedicine is:  (1) informed of the relationship between the physician and patient and the respective role of any other health care provider with respect to management of the patient; and (2) notified that he or she may decline to receive medical services by telemedicine and may withdraw from such care at any time.      Site:  [facility]  [facility], [place]     Therapeutic Intervention: Met with patient.  Outpatient - Supportive psychotherapy 45 min - CPT Code 65110 and Outpatient - Interactive psychotherapy 45 min - CPT code 69342    Chief complaint/reason for encounter: anxiety     Interval history and content of current session: Reviewed chart.     Client reports that he previously went to conferences with Luis Alberto Muir, who does inner child and shame work. Client notes that he benefited from them. Therapist used BSP to target the anxiety that comes up when he thinks about writing. Clt noted at the end of session that he was no longer activated by thinking about writing. Clt reports that he quit job with the [employer] but now unsure what to do with time. Therapist suggested looking at other part time jobs.     Treatment plan:  · Target symptoms: anxiety   · Why chosen therapy is appropriate versus another modality: relevant to diagnosis  · Outcome monitoring methods: self-report  · Therapeutic intervention type: interactive psychotherapy    Risk parameters:  Patient reports no suicidal ideation  Patient reports no homicidal ideation  Patient reports no self-injurious behavior  Patient reports no violent behavior    Verbal deficits:  None    Patient's response to intervention:  The patient's response to interventionClient  is accepting.    Progress toward goals and other mental status changes:  The patient's progress toward goals is limited.    Diagnosis:   1. Anxiety        Plan:  individual psychotherapy    Return to clinic: 1 week    Length of Service (minutes): 45 minutes

## 2020-08-13 ENCOUNTER — TELEPHONE (OUTPATIENT)
Dept: NEUROLOGY | Facility: CLINIC | Age: 70
End: 2020-08-13

## 2020-08-13 ENCOUNTER — OFFICE VISIT (OUTPATIENT)
Dept: PSYCHIATRY | Facility: CLINIC | Age: 70
End: 2020-08-13
Payer: MEDICARE

## 2020-08-13 DIAGNOSIS — F41.9 ANXIETY: ICD-10-CM

## 2020-08-13 DIAGNOSIS — F32.A DEPRESSION, UNSPECIFIED DEPRESSION TYPE: Primary | ICD-10-CM

## 2020-08-13 PROCEDURE — 90834 PSYTX W PT 45 MINUTES: CPT | Mod: ,,, | Performed by: SOCIAL WORKER

## 2020-08-13 PROCEDURE — 90834 PR PSYCHOTHERAPY W/PATIENT, 45 MIN: ICD-10-PCS | Mod: ,,, | Performed by: SOCIAL WORKER

## 2020-08-13 NOTE — PROGRESS NOTES
"Individual Psychotherapy (PhD/LCSW)    Date: 8/13/2020    Site:  [facility] [place]     Therapeutic Intervention: Met with patient.  Outpatient - Supportive psychotherapy 45 min - CPT Code 98800 and Outpatient - Interactive psychotherapy 45 min - CPT code 03274    Chief complaint/reason for encounter: depression and anxiety     Interval history and content of current session: Reviewed chart.     Client reports that he has been lacking motivation(likely frozen), which he remembers noticing in high school this tendency. Therapist used BSP with client to target his "nervous breakdown" in 2014. Client processed being addicted to the [employer], which reminded him of his childhood home. Clt also figured out that his breakdown happened 50 years after they moved out of his childhood home. Clt processed having a break up happen during that time and then being sued in court by the [employer]. Clt notes that he thought [employer] planted woman in his life to break him down emotionally. Now refers to this as "crazy." Clt reported that he made connections he was unable to make previously through BSP.    Treatment plan:  · Target symptoms: depression, anxiety   · Why chosen therapy is appropriate versus another modality: relevant to diagnosis  · Outcome monitoring methods: self-report  · Therapeutic intervention type: interactive psychotherapy    Risk parameters:  Patient reports no suicidal ideation  Patient reports no homicidal ideation  Patient reports no self-injurious behavior  Patient reports no violent behavior    Verbal deficits: None    Patient's response to intervention:  The patient's response to intervention is accepting.    Progress toward goals and other mental status changes:  The patient's progress toward goals is limited.    Diagnosis:   1. Depression, unspecified depression type    2. Anxiety        Plan:  individual psychotherapy    Return to clinic: 1 " week    Length of Service (minutes): 45 minutes

## 2020-08-19 ENCOUNTER — OFFICE VISIT (OUTPATIENT)
Dept: PSYCHIATRY | Facility: CLINIC | Age: 70
End: 2020-08-19
Payer: MEDICARE

## 2020-08-19 DIAGNOSIS — F32.A DEPRESSION, UNSPECIFIED DEPRESSION TYPE: Primary | ICD-10-CM

## 2020-08-19 PROCEDURE — 90834 PSYTX W PT 45 MINUTES: CPT | Mod: ,,, | Performed by: SOCIAL WORKER

## 2020-08-19 PROCEDURE — 90834 PR PSYCHOTHERAPY W/PATIENT, 45 MIN: ICD-10-PCS | Mod: ,,, | Performed by: SOCIAL WORKER

## 2020-08-23 NOTE — PROGRESS NOTES
"Individual Psychotherapy (PhD/LCSW)    Date: 8/19/2020    Site:  [facility]  [facility] [place]     Therapeutic Intervention: Met with patient.  Outpatient - Supportive psychotherapy 45 min - CPT Code 30636 and Outpatient - Interactive psychotherapy 45 min - CPT code 45098    Chief complaint/reason for encounter: depression     Interval history and content of current session: Reviewed chart.       Client noted that he would like to do BSP on his ex-wife next. Clt gave therapist history regarding ex-wife. They met and he knew she was the one for him, even though she was engaged to someone else. Clt and wife were good together for awhile but then wife went to get masters in social work and encouraged him to do counseling for codependency. Clt stated that he enjoyed doing things around the house and when he stopped doing these behaviors, he began to have rage attacks. Clt notes he was worried he would eventually hurt her so he left, although they tried to do several different marital therapies in order to fix the relationship, they ended up splitting up. Clt reports that ex wife recently split from boyfriend although she bought him a house to stay in. Clt notes that he tends to date women who are similar to him but both have large mood swings leading to instability in the relationship. Clt notes that he is like "Don Quixote"-obsessed with chivalrous ideals.     Treatment plan:  · Target symptoms: depression  · Why chosen therapy is appropriate versus another modality: relevant to diagnosis  · Outcome monitoring methods: self-report  · Therapeutic intervention type: insight oriented psychotherapy    Risk parameters:  Patient reports no suicidal ideation  Patient reports no homicidal ideation  Patient reports no self-injurious behavior  Patient reports no violent behavior    Verbal deficits: None    Patient's response to intervention:  The patient's response to intervention is " accepting.    Progress toward goals and other mental status changes:  The patient's progress toward goals is good.    Diagnosis:   1. Depression, unspecified depression type        Plan:  individual psychotherapy    Return to clinic: 1 week    Length of Service (minutes): 45 minutes

## 2020-08-24 ENCOUNTER — OFFICE VISIT (OUTPATIENT)
Dept: PSYCHIATRY | Facility: CLINIC | Age: 70
End: 2020-08-24
Payer: MEDICARE

## 2020-08-24 VITALS
SYSTOLIC BLOOD PRESSURE: 133 MMHG | HEART RATE: 79 BPM | HEIGHT: 72 IN | WEIGHT: 236.13 LBS | DIASTOLIC BLOOD PRESSURE: 76 MMHG | BODY MASS INDEX: 31.98 KG/M2

## 2020-08-24 DIAGNOSIS — F10.21 ALCOHOL USE DISORDER, SEVERE, IN EARLY REMISSION: ICD-10-CM

## 2020-08-24 DIAGNOSIS — F33.1 MODERATE EPISODE OF RECURRENT MAJOR DEPRESSIVE DISORDER: Primary | ICD-10-CM

## 2020-08-24 PROCEDURE — 99214 PR OFFICE/OUTPT VISIT, EST, LEVL IV, 30-39 MIN: ICD-10-PCS | Mod: S$PBB,,, | Performed by: PSYCHOLOGIST

## 2020-08-24 PROCEDURE — 99999 PR PBB SHADOW E&M-EST. PATIENT-LVL III: ICD-10-PCS | Mod: PBBFAC,,, | Performed by: PSYCHOLOGIST

## 2020-08-24 PROCEDURE — 90833 PR PSYCHOTHERAPY W/PATIENT W/E&M, 30 MIN (ADD ON): ICD-10-PCS | Mod: S$PBB,,, | Performed by: PSYCHOLOGIST

## 2020-08-24 PROCEDURE — 99999 PR PBB SHADOW E&M-EST. PATIENT-LVL III: CPT | Mod: PBBFAC,,, | Performed by: PSYCHOLOGIST

## 2020-08-24 PROCEDURE — 99213 OFFICE O/P EST LOW 20 MIN: CPT | Mod: PBBFAC,PO | Performed by: PSYCHOLOGIST

## 2020-08-24 PROCEDURE — 99214 OFFICE O/P EST MOD 30 MIN: CPT | Mod: S$PBB,,, | Performed by: PSYCHOLOGIST

## 2020-08-24 PROCEDURE — 90833 PSYTX W PT W E/M 30 MIN: CPT | Mod: S$PBB,,, | Performed by: PSYCHOLOGIST

## 2020-08-24 RX ORDER — VORTIOXETINE 20 MG/1
20 TABLET, FILM COATED ORAL DAILY
Qty: 90 TABLET | Refills: 1 | Status: SHIPPED | OUTPATIENT
Start: 2020-08-24 | End: 2021-02-25 | Stop reason: SDUPTHER

## 2020-08-24 NOTE — PROGRESS NOTES
Outpatient Psychiatry Follow-Up Visit    Clinical Status of Patient: Outpatient (Ambulatory)  08/24/2020     Chief Complaint: 69 year old male presenting today for a follow-up.       Interval History and Content of Current Session:  Interim Events/Subjective Report/Content of Current Session:  follow-up appointment.    Pt is a 69 year old male with past psychiatric hx of depression, alcoholism, anxiety with panic who presents for follow-up treatment. Pt reported considerably improved mood on Trintellix 20mg once daily. Pt said that his anxiety attacks have abated and he feels his mood is stable. Pt also has found great benefit with counseling. Pt continues to complain of poor attention, absent-mindedness, gait issues, and concern of cognitive decline. Pt encouraged to follow-up with neuropsych testing.    Past Psychiatric hx: inpt for polysubstance in 1991 and prior treatment with Zoloft, Xanax, Lamictal, Wellbutrin, trazodone, Paxil, Effexor, Elavil, Cymbalta, Lexapro, Celexa, and Prozac    Past Medical hx:   Past Medical History:   Diagnosis Date    Anxiety     Atherosclerosis of coronary artery     per CT scan dated 7/13/18    Depression     Diverticulosis of intestine without bleeding     GERD (gastroesophageal reflux disease)     Hyperlipidemia     Hypertension     Plantar fasciitis     Thoracic aorta atherosclerosis     per CT dated 7/13/18. sent for scanning    Vitamin D deficiency         Interim hx:  Medication changes last visit: discontinued Atarax and increased Trintellix dosage to 20mg Q D  Anxiety: mild - improved  Depression: mild - improved     Denies suicidal/homicidal ideations.  Denies hopelessness/worthlessness.    Denies auditory/visual hallucinations      Alcohol:  Pt is sober  Drug: THC use  Caffeine: minimal use  Tobacco: pt denied      Review of Systems   · PSYCHIATRIC: Pertinent items are noted in the narrative.        CONSTITUTIONAL: weight stable     Past Medical, Family and  Social History: The patient's past medical, family and social history have been reviewed and updated as appropriate within the electronic medical record. See encounter notes.     Current Psychiatric Medication:  Trintellix 20mg once daily      Compliance: yes      Side effects: pt denied     Risk Parameters:  Patient reports no suicidal ideation  Patient reports no homicidal ideation  Patient reports no self-injurious behavior  Patient reports no violent behavior     Exam (detailed: at least 9 elements; comprehensive: all 15 elements)   Constitutional  Vitals:  Most recent vital signs, dated less than 90 days prior to this appointment, were reviewed. Pulse:  [79]   BP: (133)/(76)       General:  unremarkable, age appropriate, casual attire, good eye contact, good rapport       Musculoskeletal  Muscle Strength/Tone:  no flaccidity, no tremor    Gait & Station:  normal      Psychiatric                       Speech:  normal tone, normal rate, rhythm, and volume   Mood & Affect:   Depressed, anxious         Thought Process:   Goal directed; Linear    Associations:   intact   Thought Content:   No SI/HI, delusions, or paranoia, no AV/VH   Insight & Judgement:   Good, adequate to circumstances   Orientation:   grossly intact; alert and oriented x 4    Memory:  intact for content of interview    Language:  grossly intact, can repeat    Attention Span  : Grossly intact for content of interview   Fund of Knowledge:   intact and appropriate to age and level of education        Assessment and Diagnosis   Status/Progress: improving     Impression: Pt suffers from recurrent depression and panic attacks. Pt has a long history of alcohol dependence with a recent relapse and a history of cocaine abuse. Pt is concerned about his memory decline but this may be related to anxiety about his health.     Diagnosis: 1) Major Depressive Disorder, recurrent, moderate    2) Alcohol Use Disorder, severe, in current  remission    Intervention/Counseling/Treatment Plan   · Medication Management:      1. Continue Trintellix 20mg once daily      2. Continue with counseling     3. Follow-up with neuropsych testing    Psychotherapy:   · Target symptoms: depression, anxiety  · Why chosen therapy is appropriate versus another modality: CBT used; relevant to diagnosis, patient responds to this modality  · Outcome monitoring methods: self-report, observation  · Therapeutic intervention type: Cognitive Behavioral Therapy  · Topics discussed/themes: building skills sets for symptom management, symptom recognition, nutrition, exercise  · The patient's response to the intervention is good  · Patient's response to treatment is: good.   · The patient's progress toward treatment goals: improving  · Duration of intervention: 20 minutes     Return to clinic: 6 months    -Spent 20min face to face with the pt; >50% time spent in counseling   -Cognitive-Behavioral/Supportive therapy and psychoeducation provided  -R/B/SE's of medications discussed with the pt who expresses understanding and chooses to take medications as prescribed.   -Pt instructed to call clinic, 911 or go to nearest emergency room if sxs worsen or pt is in   crisis. The pt expresses understanding.    Thomas Temple, PhD, MP

## 2020-08-24 NOTE — LETTER
August 24, 2020        Neeru Bolivar NP  [address]  [place] [address]  [place] [address]             [place] - Psychiat[facility]  [address]  [place] [facility] [address]  Phone: [phone]   Patient: Chris Quiroz   MR Number: [id number]   YOB: 1950   Date of Visit: 8/24/2020       Dear Dr. Bolivar:    Thank you for referring Chris Quiroz to me for evaluation. Below are the relevant portions of my assessment and plan of care.    Pt appears stable on Trintellix 20mg once daily. Will continue to monitor and follow.     If you have questions, please do not hesitate to call me. I look forward to following Chris along with you.    Sincerely,      Thomas Temple, PhD, MP           CC  No Recipients

## 2020-08-27 ENCOUNTER — OFFICE VISIT (OUTPATIENT)
Dept: PSYCHIATRY | Facility: CLINIC | Age: 70
End: 2020-08-27
Payer: MEDICARE

## 2020-08-27 DIAGNOSIS — F33.1 MODERATE EPISODE OF RECURRENT MAJOR DEPRESSIVE DISORDER: Primary | ICD-10-CM

## 2020-08-27 DIAGNOSIS — F41.9 ANXIETY: ICD-10-CM

## 2020-08-27 PROCEDURE — 90834 PR PSYCHOTHERAPY W/PATIENT, 45 MIN: ICD-10-PCS | Mod: ,,, | Performed by: SOCIAL WORKER

## 2020-08-27 PROCEDURE — 90834 PSYTX W PT 45 MINUTES: CPT | Mod: ,,, | Performed by: SOCIAL WORKER

## 2020-08-27 NOTE — PROGRESS NOTES
Individual Psychotherapy (PhD/LCSW)    Date: 8/27/2020    Site:  [facility] [place]     Therapeutic Intervention: Met with patient.  Outpatient - Supportive psychotherapy 45 min - CPT Code 22860 and Outpatient - Interactive psychotherapy 45 min - CPT code 63552    Chief complaint/reason for encounter: depression     Interval history and content of current session: Reviewed chart.     Therapist brought up client doing group psychotherapy-he is unsure. Therapist used BSP with client to target feelings associated with ex-wife. Clt processed not being able to trust her due to her being involved with a cult-like group which believes they can heal from a distance. Clt processed his sister almost dying due to being in a cult-like group that did not believe in actual medical care. Clt noted how lonely he is right now without being able to trust Dorothea. Clt's goal is to find a partner and to begin writing again.     Treatment plan:  · Target symptoms: depression  · Why chosen therapy is appropriate versus another modality: relevant to diagnosis  · Outcome monitoring methods: self-report  · Therapeutic intervention type: interactive psychotherapy    Risk parameters:  Patient reports no suicidal ideation  Patient reports no homicidal ideation  Patient reports no self-injurious behavior  Patient reports no violent behavior    Verbal deficits: None    Patient's response to intervention:  The patient's response to intervention is accepting.    Progress toward goals and other mental status changes:  The patient's progress toward goals is good.    Diagnosis:   1. Moderate episode of recurrent major depressive disorder    2. Anxiety        Plan:  individual psychotherapy    Return to clinic: 1 week    Length of Service (minutes): 45 minutes

## 2020-09-03 ENCOUNTER — OFFICE VISIT (OUTPATIENT)
Dept: PSYCHIATRY | Facility: CLINIC | Age: 70
End: 2020-09-03
Payer: MEDICARE

## 2020-09-03 DIAGNOSIS — F41.9 ANXIETY: ICD-10-CM

## 2020-09-03 DIAGNOSIS — F33.1 MODERATE EPISODE OF RECURRENT MAJOR DEPRESSIVE DISORDER: Primary | ICD-10-CM

## 2020-09-03 PROCEDURE — 90834 PSYTX W PT 45 MINUTES: CPT | Mod: ,,, | Performed by: SOCIAL WORKER

## 2020-09-03 PROCEDURE — 90834 PR PSYCHOTHERAPY W/PATIENT, 45 MIN: ICD-10-PCS | Mod: ,,, | Performed by: SOCIAL WORKER

## 2020-09-04 NOTE — PROGRESS NOTES
"Individual Psychotherapy (PhD/LCSW)    Date: 9/3/2020    Site:  [facility] [place]     Therapeutic Intervention: Met with patient.  Outpatient - Supportive psychotherapy 45 min - CPT Code 03015 and Outpatient - Interactive psychotherapy 45 min - CPT code 97765    Chief complaint/reason for encounter: depression     Interval history and content of current session: Reviewed chart.       Client reports that he has been feeling "empty" related to realizing that he can no longer trust Dorothea. Therapist noted how he has idealized her in the past and he is going through the grieving process. Therapist used BSP to target client being fearful about upcoming trip to [place] to write. 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 Idea of fake it until you make it. Clt dreaming about doing theatre, misses it a lot but cannot do it right now related to coronavirus measures. Clt does not believe he can trust anyone in his life. Therapist asked client to think about what he can trust, such as theatre, etc.     Treatment plan:  · Target symptoms: depression, anxiety   · Why chosen therapy is appropriate versus another modality: relevant to diagnosis  · Outcome monitoring methods: self-report   · Therapeutic intervention type: interactive psychotherapy    Risk parameters:  Patient reports no suicidal ideation  Patient reports no homicidal ideation  Patient reports no self-injurious behavior  Patient reports no violent behavior    Verbal deficits: None    Patient's response to intervention:  The patient's response to intervention is accepting.    Progress toward goals and other mental status changes:  The patient's progress toward goals is fair .    Diagnosis:   1. Moderate episode of recurrent major depressive disorder    2. Anxiety        Plan:  individual psychotherapy    Return to clinic: 1 week    Length of Service (minutes): 45 " minutes

## 2020-09-24 ENCOUNTER — OFFICE VISIT (OUTPATIENT)
Dept: PSYCHIATRY | Facility: CLINIC | Age: 70
End: 2020-09-24
Payer: MEDICARE

## 2020-09-24 DIAGNOSIS — F41.9 ANXIETY: ICD-10-CM

## 2020-09-24 DIAGNOSIS — F33.1 MODERATE EPISODE OF RECURRENT MAJOR DEPRESSIVE DISORDER: Primary | ICD-10-CM

## 2020-09-24 PROCEDURE — 90834 PR PSYCHOTHERAPY W/PATIENT, 45 MIN: ICD-10-PCS | Mod: ,,, | Performed by: SOCIAL WORKER

## 2020-09-24 PROCEDURE — 90834 PSYTX W PT 45 MINUTES: CPT | Mod: ,,, | Performed by: SOCIAL WORKER

## 2020-09-27 NOTE — PROGRESS NOTES
Individual Psychotherapy (PhD/LCSW)    Date: 2020    Site:  [facility]  [facility] [place]     Therapeutic Intervention: Met with patient.  Outpatient - Supportive psychotherapy 45 min - CPT Code 28566 and Outpatient - Interactive psychotherapy 45 min - CPT code 45135    Chief complaint/reason for encounter: depression and anxiety     Interval history and content of current session: Reviewed chart.     Client reports that his friend  that used to challenge him to become a better person morally. Friend had complications related to cancer. Clt notes that he was very tearful when he learned of the news. Minimal support he does have through friends seems to be decreasing. Clt decided not to attend workshop regarding racial tension due to emotional upheaval already going through. 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 Clt did go to [place] to work on book, which he was able to do. Clt greatly enjoys the [facility], feels like home. Clt plans to take trips to visit [place] from now on.     Treatment plan:  · Target symptoms: depression, anxiety   · Why chosen therapy is appropriate versus another modality: relevant to diagnosis  · Outcome monitoring methods: self-report  · Therapeutic intervention type: insight oriented psychotherapy, interactive psychotherapy    Risk parameters:  Patient reports no suicidal ideation  Patient reports no homicidal ideation  Patient reports no self-injurious behavior  Patient reports no violent behavior    Verbal deficits: None    Patient's response to intervention:  The patient's response to intervention is accepting.    Progress toward goals and other mental status changes:  The patient's progress toward goals is good.    Diagnosis:   1. Moderate episode of  recurrent major depressive disorder    2. Anxiety        Plan:  individual psychotherapy    Return to clinic: 1 week    Length of Service (minutes): 45 minutes

## 2020-10-01 ENCOUNTER — INITIAL CONSULT (OUTPATIENT)
Dept: NEUROLOGY | Facility: CLINIC | Age: 70
End: 2020-10-01
Payer: MEDICARE

## 2020-10-01 ENCOUNTER — OFFICE VISIT (OUTPATIENT)
Dept: PSYCHIATRY | Facility: CLINIC | Age: 70
End: 2020-10-01
Payer: MEDICARE

## 2020-10-01 DIAGNOSIS — F09 MILD COGNITIVE DISORDER: ICD-10-CM

## 2020-10-01 DIAGNOSIS — F10.21 ALCOHOL USE DISORDER, SEVERE, IN EARLY REMISSION: ICD-10-CM

## 2020-10-01 DIAGNOSIS — R41.89 COGNITIVE CHANGES: Primary | ICD-10-CM

## 2020-10-01 DIAGNOSIS — F33.1 MODERATE EPISODE OF RECURRENT MAJOR DEPRESSIVE DISORDER: ICD-10-CM

## 2020-10-01 DIAGNOSIS — F32.A DEPRESSION, UNSPECIFIED DEPRESSION TYPE: Primary | ICD-10-CM

## 2020-10-01 PROCEDURE — 96132 PR NEUROPSYCHOLOGIC TEST EVAL SVCS, 1ST HR: ICD-10-PCS | Mod: ,,, | Performed by: PSYCHIATRY & NEUROLOGY

## 2020-10-01 PROCEDURE — 96138 PR PSYCH/NEUROPSYCH TEST ADMIN/SCORING, BY TECH, 2+ TESTS, 1ST 30 MIN: ICD-10-PCS | Mod: ,,, | Performed by: PSYCHIATRY & NEUROLOGY

## 2020-10-01 PROCEDURE — 99499 UNLISTED E&M SERVICE: CPT | Mod: S$PBB,,, | Performed by: PSYCHIATRY & NEUROLOGY

## 2020-10-01 PROCEDURE — 99211 OFF/OP EST MAY X REQ PHY/QHP: CPT | Mod: PBBFAC | Performed by: PSYCHIATRY & NEUROLOGY

## 2020-10-01 PROCEDURE — 90785 PSYTX COMPLEX INTERACTIVE: CPT | Mod: ,,, | Performed by: SOCIAL WORKER

## 2020-10-01 PROCEDURE — 99999 PR PBB SHADOW E&M-EST. PATIENT-LVL I: ICD-10-PCS | Mod: PBBFAC,,, | Performed by: PSYCHIATRY & NEUROLOGY

## 2020-10-01 PROCEDURE — 99499 NO LOS: ICD-10-PCS | Mod: S$PBB,,, | Performed by: PSYCHIATRY & NEUROLOGY

## 2020-10-01 PROCEDURE — 96138 PSYCL/NRPSYC TECH 1ST: CPT | Mod: ,,, | Performed by: PSYCHIATRY & NEUROLOGY

## 2020-10-01 PROCEDURE — 96139 PSYCL/NRPSYC TST TECH EA: CPT | Mod: ,,, | Performed by: PSYCHIATRY & NEUROLOGY

## 2020-10-01 PROCEDURE — 96133 NRPSYC TST EVAL PHYS/QHP EA: CPT | Mod: ,,, | Performed by: PSYCHIATRY & NEUROLOGY

## 2020-10-01 PROCEDURE — 96133 PR NEUROPSYCHOLOGIC TEST EVAL SVCS, EA ADDTL HR: ICD-10-PCS | Mod: ,,, | Performed by: PSYCHIATRY & NEUROLOGY

## 2020-10-01 PROCEDURE — 96132 NRPSYC TST EVAL PHYS/QHP 1ST: CPT | Mod: ,,, | Performed by: PSYCHIATRY & NEUROLOGY

## 2020-10-01 PROCEDURE — 90785 PR INTERACTIVE COMPLEXITY: ICD-10-PCS | Mod: ,,, | Performed by: SOCIAL WORKER

## 2020-10-01 PROCEDURE — 90834 PR PSYCHOTHERAPY W/PATIENT, 45 MIN: ICD-10-PCS | Mod: ,,, | Performed by: SOCIAL WORKER

## 2020-10-01 PROCEDURE — 99999 PR PBB SHADOW E&M-EST. PATIENT-LVL I: CPT | Mod: PBBFAC,,, | Performed by: PSYCHIATRY & NEUROLOGY

## 2020-10-01 PROCEDURE — 90834 PSYTX W PT 45 MINUTES: CPT | Mod: ,,, | Performed by: SOCIAL WORKER

## 2020-10-01 PROCEDURE — 96139 PR PSYCH/NEUROPSYCH TEST ADMIN/SCORING, BY TECH, 2+ TESTS, EA ADDTL 30 MIN: ICD-10-PCS | Mod: ,,, | Performed by: PSYCHIATRY & NEUROLOGY

## 2020-10-02 NOTE — PROGRESS NOTES
NEUROPSYCHOLOGY EVALUATION  Referral Information  Name: Chris Quiroz  MRN: [id number]  : 1950  Age: 69 y.o.  Race: White  Gender: male  Referring Provider: Thomas Temple, Phd, Mp  MEDICAL NECESSITY:  Evaluate cognitive functioning in the setting of cognitive decline.   DATE CONDUCTED: 10/1/2020  [id number] - 120 minutes (2020), [id number] - 120 minutes (10/1/2020), [id number] - 221 minutes (10/1/2020)  SOURCES OF INFORMATION:  The following was gathered from a clinical interview with Mr. Chris Quiroz and review of the available medical records. Mr. Quiroz expressed an understanding of the purpose of the evaluation and consented to all procedures. Total licensed billing psychologists professional time including clinical interview, test administration and interpretation of tests administered by the billing psychologist, integration of test results and other clinical data, preparing the final report, and personally reporting results to the patient      NEUROPSYCHOLOGICAL EVALUATION - CONFIDENTIAL    SUMMARY/TREATMENT PLAN   Mr. Quiroz is a 69 year old male with with cognitive complaints since , which he connects to starting sertraline. He primarily described retrieval based difficulties and is prone to losing his train of thought. Neuropsychological testing was consistent with his self-reported complaints, revealing a mild weakness in encoding and cognitive organization/retreival with intact functioning in all other abilities. He also had a tendency to employ inefficient test taking approaches. While he typically identified his inefficiencies without prompting, not all tests would allow him to modify his approach or start over. Mr. Sheldon' symptoms are clinically significant, but non-diagnostic at this time, which is consistent with the fact that he remains functionally independent with minimal trouble. His test results, presentation, symptoms, and course are not  especially convincing for a neurodegenerative condition at this time. Rather, cerebrovascular disease is felt to believe a more likely etiology of mild cognitive changes when considering his health history and cognitive profile. He also has a history of possible NPH based on neuroimaging, but without clinical symptoms of NPH. Updated neuroimaging may be beneficial, especially when considering a recent possible head injury/mild concussion about 10 days ago. Several recommendations are offered to assist in his care/treatment.     Diagnoses  Problem List Items Addressed This Visit        Neuro    Cognitive changes - Primary    Overview     2020 neuropsych eval. Clinically significant mild decline, but non-diagnostic.          Current Assessment & Plan     Updated Neuroimaging: In the setting of a recent suspected concussion.    Neurosurgery Consult: Mr. Quiroz may consider pursuing a second opinion regarding possible NPH.      Manage Vascular Risk Factors/Optimize Brain Health: Continue to abstain from alcohol. Maintain a heart health diet and 100% treatment compliance. Prioritize physical and social activity.      Sleep: Improve sleep hygiene. Try to fall asleep at a consistent time with a routine around bedtime. Aim for 8 hours of continuous sleep each night. Avoiding napping in the mid/late afternoon.      Neuropsychology Follow-up: Interval testing every 1-2 years.             Psychiatric    Moderate episode of recurrent major depressive disorder    Current Assessment & Plan     Mental Health Treatment: Continued medication management and therapy for mood.          Alcohol use disorder, severe, in early remission        Thank you for allowing me to participate in Mr. Delvalle care.  If you have any questions, please contact me at [phone].    Haris Jasso Psy.D., LUBNAP  Board Certified in Clinical Neuropsychology  Department of Neurology    4/2020 HISTORY OF PRESENT ILLNESS: Mr. Chris Quiroz is a 69  "y.o., right-handed,   male with 19 years of education who was referred for a neuropsychological evaluation in the setting of self-reported cognitive decline. He believes the onset of his cognitive difficulties coincides with taking sertraline in 2014. Sertraline improved his anxiety, but made it more difficult to concentrate. He equates this experience with taking amitriptyline in the past. Regardless, he feels that his attention has progressively declined since 2014. He can forget why he walked into a room and notices that he can forget a name or a fact when in conversation. He recalled one instance last year when he was not fully oriented to location while driving, even though he had taken the trip many times. 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 He acknowledged that this was likely due to a variety of factors, including the fact that he was very stressed with poor sleep. The production ended in 7/2019 and he has noticed his overall functioning, mood, and cognition improved over the past 9 months, which he attributes to reduced stress and improved sleep. He has defined himself by his role in the theatre industry, but this experience was so challenging that he does not plan to be the sole /director of a show going forward.     Mr. Quiroz is followed by medical psychologist, Dr. Thomas Temple. He is in the process of weaning off sertraline and starting Trintellix. He is optimistic that changing medications may also improve his attention/cognition. In addition to the above cognitive complaints, Mr. Quiroz also described longstanding cognitive weaknesses. He feels that names/faces have never been a strength, noting that he would forget a student's name almost immediately at the conclusion of a semester. 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 He also had a tendency to hyperfocus on tasks at the expense of others. He has written several books and plays and would become engrossed in the topics to the extent that other things took a "back seat."    Mr. Quiroz moved back to [place] about 1 year ago. He is in contact with his ex-wife Dorothea and considers her a strong source of support who lives locally. He also feels that she notices his reduced cognitive clarity, which is worrying. He was especially interested in further cognitive evaluation as his sister  with Alzheimer's disease in her early 70's, with Down syndrome as a comorbidity.He was evaluated by an outside neurologist, who subsequently referred him to neurosurgery after a brain MRI apparently showed enlarged ventricles. Per Mr. Quiroz, the neurosurgeon stated that he did not have clinical symptoms of NPH, such as magnetic gait or urinary incontinence, but that they could do a shunt placement to "err on the side of caution." This caused a great deal of anxiety and Mr. Quiroz has not followed up with these providers.     10/2020 INTERVAL HISTORY: Mr. Quiroz continues to note cognitive difficulties, but with no further decline since the previous appointment. He will pull up a computer program and have to look at it for a while to figure out how to work it. He described retrieval based difficulties, noting that he can forget the name of an acquaintance when telling a story or a historical detail (such as when [place] fell).     Mr. Quiroz was transitioned from sertraline to Trintellix since the previous visit. He has not noticed any improvement/decline since the medication change, but he feels that it has been positive for his mood. Still, the pandemic has been challenging as he feels more isolated than most, noting that he can go days without talking to someone. He described himself as "piddling" " "around the house, cleaning and doing yard work, but also spending a lot of time in his bed. He will sleep during the day or listen to an audiobook. 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 He was trying to write a book himself, but had trouble with motivation/initiation. He feels that ollaborating should help him stay motivated and on a schedule.     Mr. Quiroz fell and suffered a possible head injury about 4 days prior to testing. He injured his knee when helping his ex-wife and then slipped in the shower the following day when he couldn't put pressure on the knee. He fell face first and slammed his face against the sink. He believes that he briefly blacked out and saw blood on the ground. He had a wound on the bridge of his noise and a few other scratches. He cleaned himself up, went to bed for a 1-2 hours, and decided not to seek medical care. He had a headache for a few days after the injury, but no other symptoms.     IADLS/DAILY FUNCTIONING: He lives independently. His ex-wife (Dorothea) lives close.    Support System: Dorothea and close friend, Gm.   Appointment Management: independent  Medication Compliance: independent with strong compliance. He uses a pillbox with some days when he can't recall whether or not he has taken his medication.   Financial Management: independent, bills are set to automated debit.   Cooking: independent  Driving: Continues to drive.     MEDICAL HISTORY: Mr. Quiroz  has a past medical history of Anxiety, Atherosclerosis of coronary artery, Depression, Diverticulosis of intestine without bleeding, GERD (gastroesophageal reflux disease), Hyperlipidemia, Hypertension, Plantar fasciitis, Thoracic aorta atherosclerosis, and Vitamin D deficiency. Sleeps for 3-4 hours with "short nightmares" finds that he is "jerking himself awake." He goes to sleep anywhere between 6-9pm. He is up at 3am almost every " "morning. 1-2 hour nap in the afternoon. He stopped drinking alcohol one month ago. Historically, he develops nightmares after he stops drinking, which makes sleeping unpleasant. The nightmares typically resolve after a few months.     NEUROIMAGING:  None on file, but apparently suggestive of "possible fluid on the brain."    SUBSTANCE USE: Mr. Quiroz  reports that he has quit smoking in 2001. History of polysubstance abuse. Cocaine abuse during the 80's, quit in 1986. Alcohol has been a persistent issue throughout adulthood. Inpatient treatment in 1991. 3 years of daily AA with abstinence. He occasionally returns to AA, but it doesn't connect with him like it did in the early 1990's. He is abstinent from alcohol at this time.     CURRENT MEDICATIONS:    Current Outpatient Medications:     acetaminophen (TYLENOL EXTRA STRENGTH) 500 MG tablet, Take 500 mg by mouth every 6 (six) hours as needed for Pain., Disp: , Rfl:     albuterol (VENTOLIN HFA) 90 mcg/actuation inhaler, Inhale 2 puffs into the lungs every 6 (six) hours as needed for Wheezing. Rescue, Disp: 18 g, Rfl: 1    diphenhydrAMINE (BENADRYL) 25 mg capsule, Take 25 mg by mouth daily as needed for Allergies (reports approximately three times a week)., Disp: , Rfl:     fluticasone furoate-vilanteroL (BREO ELLIPTA) 100-25 mcg/dose diskus inhaler, Inhale 1 puff into the lungs once daily. Controller, Disp: 1 each, Rfl: 11    fluticasone-salmeterol diskus inhaler 250-50 mcg, Inhale 1 puff into the lungs 2 (two) times daily. Controller, Disp: 60 each, Rfl: 2    glucosamine-chondroitin 500-400 mg tablet, Take 1 tablet by mouth as needed. , Disp: , Rfl:     melatonin 5 mg Tab, Take 2 mg by mouth nightly. , Disp: , Rfl:     rosuvastatin (CRESTOR) 10 MG tablet, Take 1 tablet (10 mg total) by mouth 3 (three) times a week., Disp: 36 tablet, Rfl: 3    vortioxetine (TRINTELLIX) 20 mg Tab, Take 1 tablet (20 mg total) by mouth once daily., Disp: 90 tablet, Rfl: 1 " "    PSYCHIATRIC HISTORY: Followed by medical psychologist Dr. Thomas Temple. Please see Dr. Temple's note from 3/20/2020 for a comprehensive overview of his psychiatric history. In short, his history is remarkable for longstanding depression, anxiety, and substance abuse. He is happy that he has distanced himself from theatre, but he is thinking about writing. Anxiety and creativity seem tied for him. Creativity gives him a rush. Described having a "breakdown" in 2014, which precipitated starting sertraline. He is currently engaged in therapy.     SUICIDAL IDEATION:  Active Suicidal Ideation:Denied  Plan/Intent: Denied    FAMILY HISTORY: family history includes Alcohol abuse in his father; Arthritis in his sister; Cancer in his brother, father, sister, sister, and sister; Depression in his father and mother; Diabetes in his mother and sister; Mental illness in his sister.    PSYCHOSOCIAL HISTORY:   Education:   Level Attained: Masters in theatre and 1 year towards PhD.    Learning Difficulties: Denied, excellent student.    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     Relationship Status:   : no   : yes, 2x   Children: none    MENTAL STATUS AND OBSERVATIONS:  APPEARANCE: Casually dressed and adequate grooming/hygiene.   ALERTNESS/ORIENTATION: Attentive and alert.   GAIT/MOTOR: Ambulated independently. Left hand tremor.   SENSORY: Corrective lenses.   SPEECH/LANGUAGE: Normal in rate, rhythm, tone, and volume. Expressive and receptive language were grossly intact.  STATED MOOD/AFFECT: Mood was euthymic.   INTERPERSONAL BEHAVIOR: Rapport was quickly and easily established   THOUGHT PROCESSES: Thoughts seemed " logical and goal-directed.  TASK ENGAGEMENT/EFFORT: Scores on standalone and embedded measures of performance validity were within normal limits, suggesting adequate task engagement/effort.   BEHAVIORAL OBSERVATIONS: He had no difficulty following or understanding test instructions. He was critical of his performance during testing.      APPENDIX/TEST RESULTS:  TESTS ADMINISTERED:  Clinical Interview and Review of Records, WMT, Test of Premorbid Functioning (TOPF), selected subtests from the Wechsler Adult Intelligence Scale - 4th Edition (WAIS-IV, [facility] demographically adjusted norms), Logical Memory subtest form the Wechsler Memory Scale - 4th Edition (WMS-IV, [facility] demographically adjusted norms), Faces subtest from [employer] ([facility]), Lowery Verbal Learning Test - Revised (HVLT-R), Kwame Complex Figure (copy trial only), Trailmaking Test Part A and B (Ida et al., 2004 norms), Controlled Oral Word Association Test (COWAT, Ida et al., 2004 norms), Semantic Verbal Fluency (Animals, Ida et al., 2004 norms), Wisconsin Card Sorting Test - 64 card version, Grooved Pegboard Test (Ida et al., 2004 norms), Naming subtest from the NAB,  Generalized Anxiety Disorder - 7 (GORDY-7), and the Paez Depression Inventory - second edition (BDI-2). Manual norms were used unless otherwise indicated.  ?     Score Label T-Score Standard Score Z-Score Scaled Score %ile Rank   Exceptionally High > 70 > 130 > 2.0  > 16 > 98   Above Average 64-69 120-129 1.4-1.9 15 91-97   High Average 57-63 110-119 0.7-1.3 12-14 75-90   Average 44-56  0.6 to -0.6 8-11 25-74   Low Average 37-43 80-89 -1.3 to -0.7 6-7 9-24   Below Average 30-36 70-79 -2.0 to -1.4 4-5 2-8   Exceptionally Low < 30 < 70  < -2.0 < 4 < 2      Pre-morbid/Baseline: Estimated to be in the high average range.      Language: Average performance on a test of confrontation naming. Above average performance on a test of letter verbal fluency. Average  performance on a test of semantic verbal fluency.      Visuospatial: Mr. Quiroz's original copy of a complex figure was well below expectation. He identified his errors, but didn't know how to correct them without starting over. He completed the copy a second time and his performance was still below expectation, but seemingly due to poor organization/planning rather than visuospatial dysfunction. His drawing demonstrated an intact appreciation for the gestalt of the figure with no clear distortion errors.      Learning/Memory: Overall encoding of a supraspan word list was average as he recalled 7, 9, and 10 of 12 words across the learning trials. Retention was below expectation following a long delay and his overall recall was below average (6/12 words). Recognition was low average as he correctly identified 11/12 words with 2 false positive errors. Overall encoding of two short stories was average. He lost several details from both stories following a long delay and his overall recall was low average. Responses to yes/no questions pertaining to the stories was within normal limits.      Motor: Fine motor abilities were low average for his dominant right hand and below average for his non-dominant left hand.      Executive Functioning: Average performance on a test of divided attention/set shifting. Average performance on tests of working memory and processing speed. One trial learning/encoding was consistently within normal limits. Performance on a card sorting test was within normal limits with no evidence of perseverative responding.      Mood: Responses on self-report inventories were indicative of a moderate degree of clinically significant anxiety and depression.       Raw Score Type of Standardized Score Standardized Score Percentile/CP   WMT IR 95 - - -   WMT DR 92 - - -   WMT Cons 92 - - -   ACS LM II Rec 19 - - -   ACS RDS 10 - - -   HVLT-R Recognition Discrimination 9 - - -   PREMORBID FUNCTIONING Raw  Score Type of Standardized Score Standardized Score Percentile/CP   TOPF simple dem. eFSIQ -  87   TOPF pred. eFSIQ -  87   TOPF simple + pred. eFSIQ -  88   INTELLECTUAL FUNCTIONING Raw Score Type of Standardized Score Standardized Score Percentile/CP   WAIS-IV       WMI - T 50    PSI - T 45    Digit Span 29 T 48          DS Forward 13 ss 14 91         DS Backward 9 ss 11 63         DS Sequence 7 ss 9 37         Longest Digit Forward 9 - - -         Longest Digit Backward 5 - - -         Longest Digit Sequence 5 - - -   Arithmetic 18 T 53    Symbol Search 28 T 49    Coding 51 T 42    LANGUAGE FUNCTIONING Raw Score Type of Standardized Score Standardized Score Percentile/CP   TOPF Word Reading 62  90   NAB Naming 30 Tscore 54 66   FAS 63 Tscore 67 96   Animal Naming 23 Tscore 56 73   VISUOSPATIAL FUNCTIONING Raw Score Type of Standardized Score Standardized Score Percentile/CP   RCFT Copy 23 - - <1   RCFT Time to Copy 600 - - <1   LEARNING & MEMORY Raw Score Type of Standardized Score Standardized Score Percentile/CP   HVLT-R       Total Immediate 26 Tscore 49 46   Delayed Recall 6 Tscore 32 4   Retention % 60 Tscore 28 1   Hits 11 - - -   False Positives 2 - - -   Discrimination  9 Tscore 38 12   WMS-IV Subtests       LM I 34 T 45    LM II 15 T 37    LM Recognition 19 - - 51-75   ATTENTION/WORKING MEMORY Raw Score Type of Standardized Score Standardized Score Percentile/CP   WAIS-IV WMI -  87   WAIS-IV Digit Span 29 ss 12 75         DS Forward 13 ss 14 91         DS Backward 9 ss 11 63         DS Sequence 7 ss 9 37         Longest Digit Forward 9 - - -         Longest Digit Backward 5 - - -         Longest Digit Sequence 5 - - -   WAIS-IV Arithmetic 18 ss 14 91   MENTAL PROCESSING SPEED Raw Score Type of Standardized Score Standardized Score Percentile/CP   WAIS-IV PSI -  50   WAIS-IV Symbol Search 28 ss 11 63   WAIS-IV Coding 51 ss 9 37   TMT A  78 Tscore 22 0.3   TMT A errors 0  - - -   EXECUTIVE FUNCTIONING Raw Score Type of Standardized Score Standardized Score Percentile/CP   TMT B 77 Tscore 46 34   TMT B errors 1 - - -   WCST-64       Total Correct 52 SS - -   Total Errors 12  66   Perseverative Resp. 5 SS >145 99   Perseverative Err. 5 SS >145 99   Nonperseverative Err. 7 SS 97 42   Concept. Level Response 49  68   Categories Completed 4 - - >16   FMS 1 - -    Learning to Learn 2.53 - - >16   FRONTOMOTOR  Raw Score Type of Standardized Score Standardized Score Percentile/CP   GPT DH 86 Tscore 42 21   GPD  Tscore 34 5   MOOD & PERSONALITY Raw Score Type of Standardized Score Standardized Score Percentile/CP   BDI-2 22 - - -   GORDY-7 11 - - -

## 2020-10-03 NOTE — PROGRESS NOTES
"Individual Psychotherapy (PhD/LCSW)    Date: 10/1/2020    Site:  [facility]  [facility] [place]     Therapeutic Intervention: Met with patient.  Outpatient - Supportive psychotherapy 45 min - CPT Code 94615 and Outpatient - Interactive psychotherapy 45 min - CPT code 48497    Chief complaint/reason for encounter: depression, anger and interpersonal     Interval history and content of current session: Reviewed chart.     Therapist used BSP with client to process brother being passive aggressive toward him. Clt blocked his brother on social media and deleted his number. Clt unable to detach from brother's aggression emotionally. Clt also processed anger issues that run in family and how tend to form alliances. Clt processed sister's partner suing family estate for $400,000 to provide for sister with down syndrome before she . Clt notes that money never used bc did not actually need it, although had to pay more taxes on it with that action that sister's partner took. Clt noted that he felt better after processing. Clt recently fell due to trying to help "favorite ex-wife" with moving items and showing off. Clt no longer going to agree to strenuous tasks.    Treatment plan:  · Target symptoms: depression  · Why chosen therapy is appropriate versus another modality: relevant to diagnosis  · Outcome monitoring methods: self-report  · Therapeutic intervention type: interactive psychotherapy    Risk parameters:  Patient reports no suicidal ideation  Patient reports no homicidal ideation  Patient reports no self-injurious behavior  Patient reports no violent behavior    Verbal deficits: None    Patient's response to intervention:  The patient's response to intervention is accepting.    Progress toward goals and other mental status changes:  The patient's progress toward goals is good.    Diagnosis:   1. Depression, unspecified depression type        Plan:  individual " psychotherapy    Return to clinic: 1 week    Length of Service (minutes): 45 minutes

## 2020-10-06 ENCOUNTER — OFFICE VISIT (OUTPATIENT)
Dept: NEUROLOGY | Facility: CLINIC | Age: 70
End: 2020-10-06
Payer: MEDICARE

## 2020-10-06 DIAGNOSIS — R41.89 COGNITIVE CHANGES: Primary | ICD-10-CM

## 2020-10-06 DIAGNOSIS — F10.21 ALCOHOL USE DISORDER, SEVERE, IN EARLY REMISSION: ICD-10-CM

## 2020-10-06 DIAGNOSIS — F09 MILD COGNITIVE DISORDER: ICD-10-CM

## 2020-10-06 DIAGNOSIS — F33.1 MODERATE EPISODE OF RECURRENT MAJOR DEPRESSIVE DISORDER: ICD-10-CM

## 2020-10-06 PROCEDURE — 99499 NO LOS: ICD-10-PCS | Mod: 95,,, | Performed by: PSYCHIATRY & NEUROLOGY

## 2020-10-06 PROCEDURE — 99499 UNLISTED E&M SERVICE: CPT | Mod: 95,,, | Performed by: PSYCHIATRY & NEUROLOGY

## 2020-10-06 NOTE — ASSESSMENT & PLAN NOTE
Updated Neuroimaging: In the setting of a recent suspected concussion.    Neurosurgery Consult: Mr. Quiroz may consider pursuing a second opinion regarding possible NPH.      Manage Vascular Risk Factors/Optimize Brain Health: Continue to abstain from alcohol. Maintain a heart health diet and 100% treatment compliance. Prioritize physical and social activity.      Sleep: Improve sleep hygiene. Try to fall asleep at a consistent time with a routine around bedtime. Aim for 8 hours of continuous sleep each night. Avoiding napping in the mid/late afternoon.      Neuropsychology Follow-up: Interval testing every 1-2 years.

## 2020-10-06 NOTE — PROGRESS NOTES
NEUROPSYCHOLOGICAL EVALUATION - CONFIDENTIAL  FEEDBACK NOTE    On 10/6/2020, I provided Mr. Chris Osorio Richie the neuropsychological evaluation results. Please see the full report for a comprehensive overview of the findings.     Haris Jasso Psy.D., ABPP  Board Certified in Clinical Neuropsychology  [facility] - Department of Neurology

## 2020-10-08 ENCOUNTER — OFFICE VISIT (OUTPATIENT)
Dept: PSYCHIATRY | Facility: CLINIC | Age: 70
End: 2020-10-08
Payer: MEDICARE

## 2020-10-08 DIAGNOSIS — F33.1 MODERATE EPISODE OF RECURRENT MAJOR DEPRESSIVE DISORDER: Primary | ICD-10-CM

## 2020-10-08 PROCEDURE — 99211 OFF/OP EST MAY X REQ PHY/QHP: CPT | Mod: PBBFAC,PO | Performed by: SOCIAL WORKER

## 2020-10-08 PROCEDURE — 99999 PR PBB SHADOW E&M-EST. PATIENT-LVL I: ICD-10-PCS | Mod: PBBFAC,,, | Performed by: SOCIAL WORKER

## 2020-10-08 PROCEDURE — 90834 PR PSYCHOTHERAPY W/PATIENT, 45 MIN: ICD-10-PCS | Mod: ,,, | Performed by: SOCIAL WORKER

## 2020-10-08 PROCEDURE — 99999 PR PBB SHADOW E&M-EST. PATIENT-LVL I: CPT | Mod: PBBFAC,,, | Performed by: SOCIAL WORKER

## 2020-10-08 PROCEDURE — 90834 PSYTX W PT 45 MINUTES: CPT | Mod: ,,, | Performed by: SOCIAL WORKER

## 2020-10-12 NOTE — PROGRESS NOTES
"Individual Psychotherapy (PhD/LCSW)    Date: 10/8/2020    Site:  [facility]  [facility] [place]     Therapeutic Intervention: Met with patient.  Outpatient - Supportive psychotherapy 45 min - CPT Code 36312 and Outpatient - Interactive psychotherapy 45 min - CPT code 35294    Chief complaint/reason for encounter: depression     Interval history and content of current session: Reviewed chart.       Clt reports that he did testing regarding dementia but his deficits are normal for his age. Clt notes that he took trip to [facility] but it did not feel like "home" like Erika did. 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 Clt also notes that he was angry about trying to go to fast food restaurants beforehand and workers not wearing mask over mouth. Therapist used BSP to assist client in processing anger. Clt processed how he felt like he fit in in  school because everyone had intense rage like he did. Clt would begin to feel like he did not fit in when going home from school due to others not being angry like him. Clt notes feeling "better" due to BSP and therapy.    Treatment plan:  · Target symptoms: depression  · Why chosen therapy is appropriate versus another modality: relevant to diagnosis  · Outcome monitoring methods: self-report  · Therapeutic intervention type: interactive psychotherapy    Risk parameters:  Patient reports no suicidal ideation  Patient reports no homicidal ideation  Patient reports no self-injurious behavior  Patient reports no violent behavior    Verbal deficits: None    Patient's response to intervention:  The patient's response to intervention is accepting.    Progress toward goals and other mental status changes:  The patient's progress toward goals is good.    Diagnosis:   1. Moderate episode of recurrent major depressive disorder        Plan:  individual " psychotherapy    Return to clinic: 1 week    Length of Service (minutes): 45 minutes

## 2020-10-15 ENCOUNTER — OFFICE VISIT (OUTPATIENT)
Dept: PSYCHIATRY | Facility: CLINIC | Age: 70
End: 2020-10-15
Payer: MEDICARE

## 2020-10-15 DIAGNOSIS — F33.1 MODERATE EPISODE OF RECURRENT MAJOR DEPRESSIVE DISORDER: Primary | ICD-10-CM

## 2020-10-15 PROCEDURE — 90785 PR INTERACTIVE COMPLEXITY: ICD-10-PCS | Mod: ,,, | Performed by: SOCIAL WORKER

## 2020-10-15 PROCEDURE — 90834 PR PSYCHOTHERAPY W/PATIENT, 45 MIN: ICD-10-PCS | Mod: ,,, | Performed by: SOCIAL WORKER

## 2020-10-15 PROCEDURE — 90785 PSYTX COMPLEX INTERACTIVE: CPT | Mod: ,,, | Performed by: SOCIAL WORKER

## 2020-10-15 PROCEDURE — 90834 PSYTX W PT 45 MINUTES: CPT | Mod: ,,, | Performed by: SOCIAL WORKER

## 2020-10-15 NOTE — PROGRESS NOTES
"Individual Psychotherapy (PhD/LCSW)    Date: 10/15/2020    Site:  [facility]IAT[facility]  [facility] [place]     Therapeutic Intervention: Met with patient.  Outpatient - Supportive psychotherapy 45 min - CPT Code 84574 and Outpatient - Interactive psychotherapy 45 min - CPT code 03655    Chief complaint/reason for encounter: depression     Interval history and content of current session: Reviewed chart.       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 Clt notes that it is likely related to depression. Clt remembers feeling like "why bother?" throughout his life, even as a child. Therapist used BSP with client to process sadness and feeling unmotivated. Clt processed memories of  school and how they had structure to where his lack of motivation was not an issue. Clt plans to go to [employer] next week for show and then going to [place] again in the future as well.    Treatment plan:  · Target symptoms: depression  · Why chosen therapy is appropriate versus another modality: relevant to diagnosis  · Outcome monitoring methods: self-report  · Therapeutic intervention type: interactive psychotherapy    Risk parameters:  Patient reports no suicidal ideation  Patient reports no homicidal ideation  Patient reports no self-injurious behavior  Patient reports no violent behavior    Verbal deficits: None    Patient's response to intervention:  The patient's response to intervention is accepting.    Progress toward goals and other mental status changes:  The patient's progress toward goals is fair .    Diagnosis:   1. Moderate episode of recurrent major depressive disorder        Plan:  individual psychotherapy    Return to clinic: 2 weeks    Length of Service (minutes): 45 minutes    "

## 2020-11-03 ENCOUNTER — OFFICE VISIT (OUTPATIENT)
Dept: PSYCHIATRY | Facility: CLINIC | Age: 70
End: 2020-11-03
Payer: MEDICARE

## 2020-11-03 DIAGNOSIS — F32.A DEPRESSION, UNSPECIFIED DEPRESSION TYPE: Primary | ICD-10-CM

## 2020-11-03 PROCEDURE — 90834 PSYTX W PT 45 MINUTES: CPT | Mod: 95,,, | Performed by: SOCIAL WORKER

## 2020-11-03 PROCEDURE — 90834 PR PSYCHOTHERAPY W/PATIENT, 45 MIN: ICD-10-PCS | Mod: 95,,, | Performed by: SOCIAL WORKER

## 2020-11-06 NOTE — PROGRESS NOTES
Individual Psychotherapy (PhD/LCSW)    Date: 11/3/2020    The patient location is: [address] [place] [id number]  The chief complaint leading to consultation is: depression  Visit type: Virtual visit with synchronous audio and video  Total time spent with patient: 45 min  Each patient to whom he or she provides medical services by telemedicine is:  (1) informed of the relationship between the physician and patient and the respective role of any other health care provider with respect to management of the patient; and (2) notified that he or she may decline to receive medical services by telemedicine and may withdraw from such care at any time    Site:  [facility] PSYCHIATRY  [facility] [place]     Therapeutic Intervention: Met with patient.  Outpatient - Supportive psychotherapy 45 min - CPT Code 53243 and Outpatient - Interactive psychotherapy 45 min - CPT code 87846    Chief complaint/reason for encounter: depression     Interval history and content of current session: Reviewed chart.     Client reports that he reconnected with woman he knew in college. Client processed how during the time he knew her he was getting over another woman and drinking a lot of alcohol. His recollection of that time is poor. Client processed how he was so heartbroken over other woman who left him and  his friend instead. Clt went to the same school she went to hoping that they would get back together and even moved into place she moved out of. Therapist plans to do brain spotting in next session over heartbreak.    Treatment plan:  · Target symptoms: depression  · Why chosen therapy is appropriate versus another modality: relevant to diagnosis  · Outcome monitoring methods: self-report  · Therapeutic intervention type: insight oriented psychotherapy    Risk parameters:  Patient reports no suicidal ideation  Patient reports no homicidal ideation  Patient reports no self-injurious  behavior  Patient reports no violent behavior    Verbal deficits: None    Patient's response to intervention:  The patient's response to intervention is accepting.    Progress toward goals and other mental status changes:  The patient's progress toward goals is good.    Diagnosis:   1. Depression, unspecified depression type        Plan:  individual psychotherapy    Return to clinic: 1 week    Length of Service (minutes): 45 minutes

## 2020-11-10 ENCOUNTER — TELEPHONE (OUTPATIENT)
Dept: FAMILY MEDICINE | Facility: CLINIC | Age: 70
End: 2020-11-10

## 2020-11-10 NOTE — TELEPHONE ENCOUNTER
----- Message from Angela Alcazar sent at 11/10/2020 10:59 AM CST -----  Contact: Rudy With ST  Type: Needs Medical Advice  Who Called:  Rudy with [facility] pulmonary rehab    Best Call Back Number: [phone] (home)     Additional Information: seen today presented with high blood pressure of 190/106 when leaving 170/96, was monitored on EKG was asymptomatic,

## 2020-11-10 NOTE — TELEPHONE ENCOUNTER
----- Message from Angela Alcazar sent at 11/10/2020 10:59 AM CST -----  Contact: Rudy With ST  Type: Needs Medical Advice  Who Called:  Rudy with [facility] pulmonary rehab    Best Call Back Number: [phone] (home)     Additional Information: seen today presented with high blood pressure of 190/106 when leaving 170/96, was monitored on EKG was asymptomatic,

## 2020-11-11 ENCOUNTER — OFFICE VISIT (OUTPATIENT)
Dept: FAMILY MEDICINE | Facility: CLINIC | Age: 70
End: 2020-11-11
Payer: MEDICARE

## 2020-11-11 ENCOUNTER — OFFICE VISIT (OUTPATIENT)
Dept: PSYCHIATRY | Facility: CLINIC | Age: 70
End: 2020-11-11
Payer: MEDICARE

## 2020-11-11 VITALS
HEIGHT: 72 IN | WEIGHT: 227.94 LBS | DIASTOLIC BLOOD PRESSURE: 74 MMHG | RESPIRATION RATE: 18 BRPM | HEART RATE: 90 BPM | SYSTOLIC BLOOD PRESSURE: 156 MMHG | BODY MASS INDEX: 30.87 KG/M2 | TEMPERATURE: 98 F

## 2020-11-11 DIAGNOSIS — I10 ESSENTIAL HYPERTENSION: Primary | ICD-10-CM

## 2020-11-11 DIAGNOSIS — Z23 IMMUNIZATION DUE: ICD-10-CM

## 2020-11-11 DIAGNOSIS — F32.A DEPRESSION, UNSPECIFIED DEPRESSION TYPE: Primary | ICD-10-CM

## 2020-11-11 DIAGNOSIS — J30.9 CHRONIC ALLERGIC RHINITIS: ICD-10-CM

## 2020-11-11 PROCEDURE — 99214 PR OFFICE/OUTPT VISIT, EST, LEVL IV, 30-39 MIN: ICD-10-PCS | Mod: S$GLB,,, | Performed by: NURSE PRACTITIONER

## 2020-11-11 PROCEDURE — 99211 OFF/OP EST MAY X REQ PHY/QHP: CPT | Mod: PBBFAC,PO | Performed by: SOCIAL WORKER

## 2020-11-11 PROCEDURE — 90834 PSYTX W PT 45 MINUTES: CPT | Mod: ,,, | Performed by: SOCIAL WORKER

## 2020-11-11 PROCEDURE — 90785 PR INTERACTIVE COMPLEXITY: ICD-10-PCS | Mod: ,,, | Performed by: SOCIAL WORKER

## 2020-11-11 PROCEDURE — 99214 OFFICE O/P EST MOD 30 MIN: CPT | Mod: S$GLB,,, | Performed by: NURSE PRACTITIONER

## 2020-11-11 PROCEDURE — 99999 PR PBB SHADOW E&M-EST. PATIENT-LVL I: ICD-10-PCS | Mod: PBBFAC,,, | Performed by: SOCIAL WORKER

## 2020-11-11 PROCEDURE — 90785 PSYTX COMPLEX INTERACTIVE: CPT | Mod: ,,, | Performed by: SOCIAL WORKER

## 2020-11-11 PROCEDURE — G0009 PNEUMOCOCCAL POLYSACCHARIDE VACCINE 23-VALENT =>2YO SQ IM: ICD-10-PCS | Mod: S$GLB,,, | Performed by: NURSE PRACTITIONER

## 2020-11-11 PROCEDURE — G0009 ADMIN PNEUMOCOCCAL VACCINE: HCPCS | Mod: S$GLB,,, | Performed by: NURSE PRACTITIONER

## 2020-11-11 PROCEDURE — 90732 PNEUMOCOCCAL POLYSACCHARIDE VACCINE 23-VALENT =>2YO SQ IM: ICD-10-PCS | Mod: S$GLB,,, | Performed by: NURSE PRACTITIONER

## 2020-11-11 PROCEDURE — 99999 PR PBB SHADOW E&M-EST. PATIENT-LVL I: CPT | Mod: PBBFAC,,, | Performed by: SOCIAL WORKER

## 2020-11-11 PROCEDURE — 90732 PPSV23 VACC 2 YRS+ SUBQ/IM: CPT | Mod: S$GLB,,, | Performed by: NURSE PRACTITIONER

## 2020-11-11 PROCEDURE — 90834 PR PSYCHOTHERAPY W/PATIENT, 45 MIN: ICD-10-PCS | Mod: ,,, | Performed by: SOCIAL WORKER

## 2020-11-11 RX ORDER — EPHEDRINE HCL, GUAIFENESIN 12.5; 2 MG/1; MG/1
TABLET ORAL
COMMUNITY
End: 2020-12-11

## 2020-11-11 RX ORDER — LISINOPRIL 10 MG/1
10 TABLET ORAL DAILY
Qty: 30 TABLET | Refills: 3 | Status: SHIPPED | OUTPATIENT
Start: 2020-11-11 | End: 2020-12-11 | Stop reason: SDUPTHER

## 2020-11-11 NOTE — PROGRESS NOTES
Subjective:       Patient ID: Chris Quiroz is a 70 y.o. male.    Chief Complaint: Has high blood presure (Was told yesterday at rehab elevated)    HPI here with concerns for elevated BP. He has had HTN in the past but has not been on medication for this in some time due to his readings were in good range. He is going to rehab and states yesterday his BP was high. He is going to pulmonary rehab. His BP reading yesterday was 190/106. States he has been taking sudafed and feels that raised his BP. Has been coughing a lot. Using his inhalers. He has BP cuff at home. Will monitor his BP. Advised that he needs to be on low dose BP medication. He has done well with lisinopril in the past. He denies any fever. He has bad allergies. He states he moved here from [place] due to allergies, now he is having the same issues here. He would like referral to allergy for testing. He does not want to schedule any labs at this time. He will get shingles and TDap at the pharmacy. See ROS.    The following portion of the patients history was reviewed and updated as appropriate: allergies, current medications, past medical and surgical history. Past social history and problem list reviewed. Family PMH and Past social history reviewed. Tobacco, Illicit drug use reviewed.      Review of patient's allergies indicates:   Allergen Reactions    Juniper Shortness Of Breath     States lungs fill up with fluid    Cat dander          Current Outpatient Medications:     acetaminophen (TYLENOL EXTRA STRENGTH) 500 MG tablet, Take 500 mg by mouth every 6 (six) hours as needed for Pain., Disp: , Rfl:     albuterol (VENTOLIN HFA) 90 mcg/actuation inhaler, Inhale 2 puffs into the lungs every 6 (six) hours as needed for Wheezing. Rescue, Disp: 18 g, Rfl: 1    diphenhydrAMINE (BENADRYL) 25 mg capsule, Take 25 mg by mouth daily as needed for Allergies (reports approximately three times a week)., Disp: , Rfl:     ephedrine-guaifenesin (PRIMATENE  ASTHMA) 12.5-200 mg Tab, Take by mouth., Disp: , Rfl:     fluticasone furoate-vilanteroL (BREO ELLIPTA) 100-25 mcg/dose diskus inhaler, Inhale 1 puff into the lungs once daily. Controller, Disp: 1 each, Rfl: 11    fluticasone-salmeterol diskus inhaler 250-50 mcg, Inhale 1 puff into the lungs 2 (two) times daily. Controller, Disp: 60 each, Rfl: 2    rosuvastatin (CRESTOR) 10 MG tablet, Take 1 tablet (10 mg total) by mouth 3 (three) times a week., Disp: 36 tablet, Rfl: 3    vortioxetine (TRINTELLIX) 20 mg Tab, Take 1 tablet (20 mg total) by mouth once daily., Disp: 90 tablet, Rfl: 1    glucosamine-chondroitin 500-400 mg tablet, Take 1 tablet by mouth as needed. , Disp: , Rfl:     melatonin 5 mg Tab, Take 2 mg by mouth nightly. , Disp: , Rfl:     Past Medical History:   Diagnosis Date    Anxiety     Atherosclerosis of coronary artery     per CT scan dated 7/13/18    Depression     Diverticulosis of intestine without bleeding     GERD (gastroesophageal reflux disease)     Hyperlipidemia     Hypertension     Plantar fasciitis     Thoracic aorta atherosclerosis     per CT dated 7/13/18. sent for scanning    Vitamin D deficiency        Past Surgical History:   Procedure Laterality Date    COLONOSCOPY      ESOPHAGOGASTRODUODENOSCOPY Left 12/7/2018    Procedure: EGD (ESOPHAGOGASTRODUODENOSCOPY);  Surgeon: Josiah Cuadra MD;  Location: [facility];  Service: Endoscopy;  Laterality: Left;       Social History     Socioeconomic History    Marital status:      Spouse name: Not on file    Number of children: Not on file    Years of education: Not on file    Highest education level: Not on file   Occupational History    Not on file   Social Needs    Financial resource strain: Not on file    Food insecurity     Worry: Not on file     Inability: Not on file    Transportation needs     Medical: Not on file     Non-medical: Not on file   Tobacco Use    Smoking status: Former Smoker     Packs/day:  2.00    Smokeless tobacco: Never Used    Tobacco comment: Age Started: 22 Age Quit: 51   Substance and Sexual Activity    Alcohol use: Not Currently     Frequency: Never     Comment: once in a while    Drug use: No     Comment: Tried  CBD     Sexual activity: Never   Lifestyle    Physical activity     Days per week: Not on file     Minutes per session: Not on file    Stress: Not on file   Relationships    Social connections     Talks on phone: Not on file     Gets together: Not on file     Attends Yarsani service: Not on file     Active member of club or organization: Not on file     Attends meetings of clubs or organizations: Not on file     Relationship status: Not on file   Other Topics Concern    Not on file   Social History Narrative    Not on file     Review of Systems   Constitutional: Negative for activity change, appetite change, fatigue and fever.   HENT: Positive for postnasal drip, rhinorrhea and sneezing. Negative for congestion and trouble swallowing.    Eyes: Negative for visual disturbance.   Respiratory: Positive for cough (from PND). Negative for chest tightness, shortness of breath and wheezing.    Cardiovascular: Negative for chest pain, palpitations and leg swelling.   Gastrointestinal: Negative for abdominal pain, blood in stool, diarrhea, nausea and vomiting.   Genitourinary: Negative for difficulty urinating.   Musculoskeletal: Negative for arthralgias, back pain and gait problem.   Skin: Negative for rash.   Neurological: Negative for dizziness, weakness and headaches.   Hematological: Negative for adenopathy. Does not bruise/bleed easily.   Psychiatric/Behavioral: Negative for decreased concentration, dysphoric mood and sleep disturbance. The patient is not nervous/anxious.        Objective:      BP (!) 156/74   Pulse 90   Temp 98.4 °F (36.9 °C) (Temporal)   Resp 18   Ht 6' (1.829 m)   Wt 103.4 kg (227 lb 15.3 oz)   BMI 30.92 kg/m²      Physical Exam  Vitals signs and  nursing note reviewed.   Constitutional:       General: He is not in acute distress.     Appearance: He is well-developed. He is obese. He is not diaphoretic.   HENT:      Head: Normocephalic and atraumatic.      Right Ear: Tympanic membrane, ear canal and external ear normal.      Left Ear: Tympanic membrane, ear canal and external ear normal.      Nose: Rhinorrhea present.      Mouth/Throat:      Pharynx: Oropharynx is clear. No oropharyngeal exudate.   Eyes:      General:         Right eye: No discharge.         Left eye: No discharge.      Conjunctiva/sclera: Conjunctivae normal.      Pupils: Pupils are equal, round, and reactive to light.   Neck:      Musculoskeletal: Normal range of motion and neck supple.      Thyroid: No thyromegaly.      Vascular: No JVD.   Cardiovascular:      Rate and Rhythm: Normal rate and regular rhythm.      Pulses:           Radial pulses are 2+ on the right side and 2+ on the left side.      Heart sounds: Normal heart sounds. No murmur. No gallop.    Pulmonary:      Effort: Pulmonary effort is normal. No respiratory distress.      Breath sounds: Normal breath sounds. No wheezing or rales.   Chest:      Chest wall: No tenderness.   Abdominal:      General: Bowel sounds are normal. There is no distension.      Palpations: Abdomen is soft.      Tenderness: There is no abdominal tenderness. There is no guarding or rebound.   Musculoskeletal: Normal range of motion.      Right lower leg: No edema.      Left lower leg: No edema.      Comments: Gait and coordination normal.  strong, equal. Upper and lower extremity strength normal.    Lymphadenopathy:      Cervical: No cervical adenopathy.   Skin:     General: Skin is warm and dry.      Capillary Refill: Capillary refill takes less than 2 seconds.      Findings: No rash.   Neurological:      Mental Status: He is alert and oriented to person, place, and time.   Psychiatric:         Attention and Perception: Attention and perception  normal.         Mood and Affect: Mood and affect normal.         Speech: Speech normal.         Behavior: Behavior normal.         Thought Content: Thought content normal.         Judgment: Judgment normal.         Assessment:       1. Essential hypertension    2. Chronic allergic rhinitis    3. Immunization due        Plan:       Essential hypertension: start on lisinopril 10mg daily. Monitor home readings. Call in one week with readings. This can cause chronic cough, will monitor.    Chronic allergic rhinitis: refer to allergist.   -     Ambulatory referral/consult to Allergy; Future; Expected date: 11/18/2020    Immunization due  -     Pneumococcal Polysaccharide Vaccine (23 Valent) (SQ/IM)    Other orders  -     lisinopriL 10 MG tablet; Take 1 tablet (10 mg total) by mouth once daily.  Dispense: 30 tablet; Refill: 3       Continue current medication  Take medications only as prescribed  Healthy diet, exercise  Adequate rest  Adequate hydration  Avoid allergens  Avoid excessive caffeine     follow up one week for BP readings.

## 2020-11-14 ENCOUNTER — PATIENT MESSAGE (OUTPATIENT)
Dept: FAMILY MEDICINE | Facility: CLINIC | Age: 70
End: 2020-11-14

## 2020-11-15 ENCOUNTER — TELEPHONE (OUTPATIENT)
Dept: FAMILY MEDICINE | Facility: CLINIC | Age: 70
End: 2020-11-15

## 2020-11-15 NOTE — TELEPHONE ENCOUNTER
BP readings entered    here are the results of my first three days of home blood pressure tests.  wed    11-Nov         4:13   112/96  pm               4:15   148/88                      4:17   137/90                      6:24   104/90                      6:25   173/95                      6:28   147/89  thur    12-Nov         6:03   156/95  am               6:04   168/101                      6:06   160/95                      8:57   150/86                      9:25   147/83  pm               3:55   161/84                      4:00   141/79  fr        13-Nov         11:00 AM     137/83  [facility]   4:35 PM       146/78                      4:38 PM       139/79  LISINIPRIL day 1               7:25 PM       134/71            14-Nov         7:05 AM       131/79                      8:20 A.M.     130/75  lisinipril day 2                   11:50 111/59                      11:53AM      111/59                      12:10PM      110/60

## 2020-11-15 NOTE — PROGRESS NOTES
Individual Psychotherapy (PhD/LCSW)    Date: 11/11/2020    Site:  [facility] [place]     Therapeutic Intervention: Met with patient.  Outpatient - Supportive psychotherapy 45 min - CPT Code 24478 and Outpatient - Interactive psychotherapy 45 min - CPT code 36730    Chief complaint/reason for encounter: depression     Interval history and content of current session: Reviewed chart.     Therapist used brainspotting with client to process past heartbreak over woman named Swetha. Client processed how he missed out on relationships that would have been positive for him due to pining over her for years. Client reported feeling better at end of session.     Treatment plan:  · Target symptoms: depression  · Why chosen therapy is appropriate versus another modality: relevant to diagnosis  · Outcome monitoring methods: self-report  · Therapeutic intervention type: interactive psychotherapy    Risk parameters:  Patient reports no suicidal ideation  Patient reports no homicidal ideation  Patient reports no self-injurious behavior  Patient reports no violent behavior    Verbal deficits: None    Patient's response to intervention:  The patient's response to intervention is accepting.    Progress toward goals and other mental status changes:  The patient's progress toward goals is good.    Diagnosis:   1. Depression, unspecified depression type        Plan:  individual psychotherapy    Return to clinic: 1 week    Length of Service (minutes): 45 minutes

## 2020-11-18 ENCOUNTER — OFFICE VISIT (OUTPATIENT)
Dept: PSYCHIATRY | Facility: CLINIC | Age: 70
End: 2020-11-18
Payer: MEDICARE

## 2020-11-18 DIAGNOSIS — F32.A DEPRESSION, UNSPECIFIED DEPRESSION TYPE: Primary | ICD-10-CM

## 2020-11-18 PROCEDURE — 90834 PSYTX W PT 45 MINUTES: CPT | Mod: ,,, | Performed by: SOCIAL WORKER

## 2020-11-18 PROCEDURE — 90785 PSYTX COMPLEX INTERACTIVE: CPT | Mod: ,,, | Performed by: SOCIAL WORKER

## 2020-11-18 PROCEDURE — 90785 PR INTERACTIVE COMPLEXITY: ICD-10-PCS | Mod: ,,, | Performed by: SOCIAL WORKER

## 2020-11-18 PROCEDURE — 90834 PR PSYCHOTHERAPY W/PATIENT, 45 MIN: ICD-10-PCS | Mod: ,,, | Performed by: SOCIAL WORKER

## 2020-11-22 NOTE — PROGRESS NOTES
Individual Psychotherapy (PhD/LCSW)    Date: 11/18/2020    Site:  [facility] PS[facility]CHIAT[facility]  [facility] [place]     Therapeutic Intervention: Met with patient.  Outpatient - Supportive psychotherapy 45 min - CPT Code 10676 and Outpatient - Interactive psychotherapy 45 min - CPT code 93564    Chief complaint/reason for encounter: depression     Interval history and content of current session: Reviewed chart.     Client reports that he believes his dog may be causing his allergies. Client's theory is that sometimes can be psychological in nature so therapist assisted client in processing history with dogs. Clt processed identifying with childhood dog who was trapped in his cage area. Clt then processed death of past dogs and how it was difficult to lose dog to ex-wife after they split. Client noted feeling better after doing BSP.     Treatment plan:  · Target symptoms: depression  · Why chosen therapy is appropriate versus another modality: relevant to diagnosis  · Outcome monitoring methods: self-report  · Therapeutic intervention type: interactive psychotherapy    Risk parameters:  Patient reports no suicidal ideation  Patient reports no homicidal ideation  Patient reports no self-injurious behavior  Patient reports no violent behavior    Verbal deficits: None    Patient's response to intervention:  The patient's response to intervention is accepting.    Progress toward goals and other mental status changes:  The patient's progress toward goals is good.    Diagnosis:   1. Depression, unspecified depression type        Plan:  individual psychotherapy    Return to clinic: 1 week    Length of Service (minutes): 45 minutes

## 2020-11-24 ENCOUNTER — OFFICE VISIT (OUTPATIENT)
Dept: PSYCHIATRY | Facility: CLINIC | Age: 70
End: 2020-11-24
Payer: MEDICARE

## 2020-11-24 DIAGNOSIS — F32.A DEPRESSION, UNSPECIFIED DEPRESSION TYPE: Primary | ICD-10-CM

## 2020-11-24 PROCEDURE — 90834 PR PSYCHOTHERAPY W/PATIENT, 45 MIN: ICD-10-PCS | Mod: 95,,, | Performed by: SOCIAL WORKER

## 2020-11-24 PROCEDURE — 90834 PSYTX W PT 45 MINUTES: CPT | Mod: 95,,, | Performed by: SOCIAL WORKER

## 2020-11-24 PROCEDURE — 90785 PR INTERACTIVE COMPLEXITY: ICD-10-PCS | Mod: 95,,, | Performed by: SOCIAL WORKER

## 2020-11-24 PROCEDURE — 90785 PSYTX COMPLEX INTERACTIVE: CPT | Mod: 95,,, | Performed by: SOCIAL WORKER

## 2020-11-25 NOTE — PROGRESS NOTES
Individual Psychotherapy (PhD/LCSW)    Date: 11/24/2020    The patient location is: [address] [place] [id number]  The chief complaint leading to consultation is: depression  Visit type: Virtual visit with synchronous audio and video  Total time spent with patient: 45 min  Each patient to whom he or she provides medical services by telemedicine is:  (1) informed of the relationship between the physician and patient and the respective role of any other health care provider with respect to management of the patient; and (2) notified that he or she may decline to receive medical services by telemedicine and may withdraw from such care at any time.      Site:  [facility] PS[facility]CHIAT[facility]  [facility] [place]     Therapeutic Intervention: Met with patient.  Outpatient - Supportive psychotherapy 45 min - CPT Code 11956 and Outpatient - Interactive psychotherapy 45 min - CPT code 41326    Chief complaint/reason for encounter: depression     Interval history and content of current session: Reviewed chart.     Therapist used BSP with client to target emptiness that he feels. Clt processed how he does not know who he is now that he has not been writing. Therapist processed with client idea of human doing vs human being. Clt also notes that dog is going to undergo treatment for heart worm, which he got several opinions about.     Treatment plan:  · Target symptoms: depression  · Why chosen therapy is appropriate versus another modality: relevant to diagnosis  · Outcome monitoring methods: self-report  · Therapeutic intervention type: interactive psychotherapy    Risk parameters:  Patient reports no suicidal ideation  Patient reports no homicidal ideation  Patient reports no self-injurious behavior  Patient reports no violent behavior    Verbal deficits: None    Patient's response to intervention:  The patient's response to intervention is accepting.    Progress toward goals and other  mental status changes:  The patient's progress toward goals is good.    Diagnosis:   1. Depression, unspecified depression type        Plan:  individual psychotherapy    Return to clinic: 1 week    Length of Service (minutes): 45 minutes

## 2020-12-02 ENCOUNTER — OFFICE VISIT (OUTPATIENT)
Dept: PSYCHIATRY | Facility: CLINIC | Age: 70
End: 2020-12-02
Payer: MEDICARE

## 2020-12-02 DIAGNOSIS — F32.A DEPRESSION, UNSPECIFIED DEPRESSION TYPE: Primary | ICD-10-CM

## 2020-12-02 PROCEDURE — 90834 PR PSYCHOTHERAPY W/PATIENT, 45 MIN: ICD-10-PCS | Mod: ,,, | Performed by: SOCIAL WORKER

## 2020-12-02 PROCEDURE — 90834 PSYTX W PT 45 MINUTES: CPT | Mod: ,,, | Performed by: SOCIAL WORKER

## 2020-12-08 NOTE — PROGRESS NOTES
Individual Psychotherapy (PhD/LCSW)    Date: 12/2/2020    Site:  [facility] [place]     Therapeutic Intervention: Met with patient.  Outpatient - Supportive psychotherapy 45 min - CPT Code 93108 and Outpatient - Interactive psychotherapy 45 min - CPT code 73087    Chief complaint/reason for encounter: depression     Interval history and content of current session: Reviewed chart.     Client reports that he is still upset about dog receiving treatment for heartworm due to his dog being a major support right now. Clt processed how in the past, he joined a 12 step-like group and made amends to person. In making the amends the person told him she wanted to be his . Clt went to sponsor who told him that he had to do whatever it takes to make amends. Clt made her his  and she ended up taking all the money and moving away. Clt unable to pursue charges due to her being in a different state and circumstances there. Therapist plans to process further in next session.     Treatment plan:  · Target symptoms: depression  · Why chosen therapy is appropriate versus another modality: relevant to diagnosis  · Outcome monitoring methods: self-report  · Therapeutic intervention type: insight oriented psychotherapy    Risk parameters:  Patient reports no suicidal ideation  Patient reports no homicidal ideation  Patient reports no self-injurious behavior  Patient reports no violent behavior    Verbal deficits: None    Patient's response to intervention:  The patient's response to intervention is accepting.    Progress toward goals and other mental status changes:  The patient's progress toward goals is fair .    Diagnosis:   1. Depression, unspecified depression type        Plan:  individual psychotherapy    Return to clinic: 1 week    Length of Service (minutes): 45 minutes

## 2020-12-10 ENCOUNTER — OFFICE VISIT (OUTPATIENT)
Dept: PSYCHIATRY | Facility: CLINIC | Age: 70
End: 2020-12-10
Payer: MEDICARE

## 2020-12-10 DIAGNOSIS — F32.A DEPRESSION, UNSPECIFIED DEPRESSION TYPE: Primary | ICD-10-CM

## 2020-12-10 PROCEDURE — 90785 PR INTERACTIVE COMPLEXITY: ICD-10-PCS | Mod: ,,, | Performed by: SOCIAL WORKER

## 2020-12-10 PROCEDURE — 99999 PR PBB SHADOW E&M-EST. PATIENT-LVL I: ICD-10-PCS | Mod: PBBFAC,,, | Performed by: SOCIAL WORKER

## 2020-12-10 PROCEDURE — 90834 PSYTX W PT 45 MINUTES: CPT | Mod: ,,, | Performed by: SOCIAL WORKER

## 2020-12-10 PROCEDURE — 99999 PR PBB SHADOW E&M-EST. PATIENT-LVL I: CPT | Mod: PBBFAC,,, | Performed by: SOCIAL WORKER

## 2020-12-10 PROCEDURE — 90834 PR PSYCHOTHERAPY W/PATIENT, 45 MIN: ICD-10-PCS | Mod: ,,, | Performed by: SOCIAL WORKER

## 2020-12-10 PROCEDURE — 90785 PSYTX COMPLEX INTERACTIVE: CPT | Mod: ,,, | Performed by: SOCIAL WORKER

## 2020-12-10 PROCEDURE — 99211 OFF/OP EST MAY X REQ PHY/QHP: CPT | Mod: PBBFAC,PO | Performed by: SOCIAL WORKER

## 2020-12-11 ENCOUNTER — OFFICE VISIT (OUTPATIENT)
Dept: FAMILY MEDICINE | Facility: CLINIC | Age: 70
End: 2020-12-11
Payer: MEDICARE

## 2020-12-11 VITALS
TEMPERATURE: 98 F | BODY MASS INDEX: 31.18 KG/M2 | HEART RATE: 74 BPM | OXYGEN SATURATION: 95 % | WEIGHT: 230.19 LBS | RESPIRATION RATE: 18 BRPM | HEIGHT: 72 IN | DIASTOLIC BLOOD PRESSURE: 78 MMHG | SYSTOLIC BLOOD PRESSURE: 156 MMHG

## 2020-12-11 DIAGNOSIS — E78.2 MIXED HYPERLIPIDEMIA: Primary | ICD-10-CM

## 2020-12-11 DIAGNOSIS — I10 ESSENTIAL HYPERTENSION: ICD-10-CM

## 2020-12-11 PROCEDURE — 99214 PR OFFICE/OUTPT VISIT, EST, LEVL IV, 30-39 MIN: ICD-10-PCS | Mod: S$GLB,,, | Performed by: NURSE PRACTITIONER

## 2020-12-11 PROCEDURE — 99214 OFFICE O/P EST MOD 30 MIN: CPT | Mod: S$GLB,,, | Performed by: NURSE PRACTITIONER

## 2020-12-11 RX ORDER — LISINOPRIL 20 MG/1
20 TABLET ORAL DAILY
Qty: 30 TABLET | Refills: 3 | Status: ON HOLD | OUTPATIENT
Start: 2020-12-11 | End: 2021-03-01 | Stop reason: SDUPTHER

## 2020-12-11 RX ORDER — ROSUVASTATIN CALCIUM 10 MG/1
10 TABLET, COATED ORAL DAILY
Qty: 90 TABLET | Refills: 3 | Status: SHIPPED | OUTPATIENT
Start: 2020-12-11 | End: 2022-05-04 | Stop reason: SDUPTHER

## 2020-12-11 NOTE — PROGRESS NOTES
Subjective:       Patient ID: Chris Quiroz is a 70 y.o. male.    Chief Complaint: Hypertension (1 month follow up)    HPI Here for one month follow up on HTN. His readings are still higher than we want them to be. Denies any chest pain. Will increase the dose of his BP medication. He will continue to monitor and call me in one week with the readings. Avoid high sodium foods, drinks. Exercise. States his allergies are finally doing better. No other concerns today. See ROS.    The following portion of the patients history was reviewed and updated as appropriate: allergies, current medications, past medical and surgical history. Past social history and problem list reviewed. Family PMH and Past social history reviewed. Tobacco, Illicit drug use reviewed.      Review of patient's allergies indicates:   Allergen Reactions    Juniper Shortness Of Breath     States lungs fill up with fluid    Cat dander          Current Outpatient Medications:     acetaminophen (TYLENOL EXTRA STRENGTH) 500 MG tablet, Take 500 mg by mouth every 6 (six) hours as needed for Pain., Disp: , Rfl:     albuterol (VENTOLIN HFA) 90 mcg/actuation inhaler, Inhale 2 puffs into the lungs every 6 (six) hours as needed for Wheezing. Rescue, Disp: 18 g, Rfl: 1    diphenhydrAMINE (BENADRYL) 25 mg capsule, Take 25 mg by mouth daily as needed for Allergies (reports approximately three times a week)., Disp: , Rfl:     fluticasone furoate-vilanteroL (BREO ELLIPTA) 100-25 mcg/dose diskus inhaler, Inhale 1 puff into the lungs once daily. Controller, Disp: 1 each, Rfl: 11    fluticasone-salmeterol diskus inhaler 250-50 mcg, Inhale 1 puff into the lungs 2 (two) times daily. Controller, Disp: 60 each, Rfl: 2    lisinopriL 10 MG tablet, Take 1 tablet (10 mg total) by mouth once daily., Disp: 30 tablet, Rfl: 3    melatonin 5 mg Tab, Take 2 mg by mouth nightly. , Disp: , Rfl:     UNABLE TO FIND, Tumeric- for a cough, Disp: , Rfl:     vortioxetine  (TRINTELLIX) 20 mg Tab, Take 1 tablet (20 mg total) by mouth once daily., Disp: 90 tablet, Rfl: 1    glucosamine-chondroitin 500-400 mg tablet, Take 1 tablet by mouth as needed. , Disp: , Rfl:     rosuvastatin (CRESTOR) 10 MG tablet, Take 1 tablet (10 mg total) by mouth 3 (three) times a week. (Patient not taking: Reported on 12/11/2020), Disp: 36 tablet, Rfl: 3    Past Medical History:   Diagnosis Date    Anxiety     Atherosclerosis of coronary artery     per CT scan dated 7/13/18    Depression     Diverticulosis of intestine without bleeding     GERD (gastroesophageal reflux disease)     Hyperlipidemia     Hypertension     Plantar fasciitis     Thoracic aorta atherosclerosis     per CT dated 7/13/18. sent for scanning    Vitamin D deficiency        Past Surgical History:   Procedure Laterality Date    COLONOSCOPY      ESOPHAGOGASTRODUODENOSCOPY Left 12/7/2018    Procedure: EGD (ESOPHAGOGASTRODUODENOSCOPY);  Surgeon: Josiah Cuadra MD;  Location: [facility];  Service: Endoscopy;  Laterality: Left;       Social History     Socioeconomic History    Marital status:      Spouse name: Not on file    Number of children: Not on file    Years of education: Not on file    Highest education level: Not on file   Occupational History    Not on file   Social Needs    Financial resource strain: Not on file    Food insecurity     Worry: Not on file     Inability: Not on file    Transportation needs     Medical: Not on file     Non-medical: Not on file   Tobacco Use    Smoking status: Former Smoker     Packs/day: 2.00    Smokeless tobacco: Never Used    Tobacco comment: Age Started: 22 Age Quit: 51   Substance and Sexual Activity    Alcohol use: Not Currently     Frequency: Never     Comment: once in a while    Drug use: No     Comment: Tried  CBD     Sexual activity: Never   Lifestyle    Physical activity     Days per week: Not on file     Minutes per session: Not on file    Stress: Not  on file   Relationships    Social connections     Talks on phone: Not on file     Gets together: Not on file     Attends Sikh service: Not on file     Active member of club or organization: Not on file     Attends meetings of clubs or organizations: Not on file     Relationship status: Not on file   Other Topics Concern    Not on file   Social History Narrative    Not on file     Review of Systems   Constitutional: Negative for activity change, appetite change, fatigue and fever.   HENT: Negative for congestion, rhinorrhea and trouble swallowing.    Eyes: Negative for visual disturbance.   Respiratory: Negative for cough, shortness of breath and wheezing.    Cardiovascular: Negative for chest pain, palpitations and leg swelling.   Gastrointestinal: Negative for abdominal pain, blood in stool, diarrhea, nausea and vomiting.   Genitourinary: Negative for difficulty urinating.   Musculoskeletal: Negative for back pain and gait problem.   Skin: Negative for rash.   Neurological: Negative for dizziness, weakness and headaches.   Hematological: Negative for adenopathy. Does not bruise/bleed easily.   Psychiatric/Behavioral: Negative for decreased concentration, dysphoric mood, self-injury, sleep disturbance and suicidal ideas. The patient is not nervous/anxious.        Objective:      BP (!) 156/78   Pulse 74   Temp 98.4 °F (36.9 °C) (Temporal)   Resp 18   Ht 6' (1.829 m)   Wt 104.4 kg (230 lb 2.6 oz)   SpO2 95%   BMI 31.22 kg/m²    Physical Exam  Vitals signs and nursing note reviewed.   Constitutional:       General: He is not in acute distress.     Appearance: He is well-developed. He is not diaphoretic.   HENT:      Head: Normocephalic and atraumatic.      Mouth/Throat:      Pharynx: Oropharynx is clear. No oropharyngeal exudate.   Eyes:      General:         Right eye: No discharge.         Left eye: No discharge.      Conjunctiva/sclera: Conjunctivae normal.      Pupils: Pupils are equal, round, and  reactive to light.   Neck:      Musculoskeletal: Full passive range of motion without pain, normal range of motion and neck supple.      Thyroid: No thyromegaly.      Vascular: No JVD.   Cardiovascular:      Rate and Rhythm: Normal rate and regular rhythm.      Pulses:           Carotid pulses are 2+ on the right side and 2+ on the left side.       Radial pulses are 2+ on the right side and 2+ on the left side.      Heart sounds: Normal heart sounds. No murmur. No gallop.    Pulmonary:      Effort: Pulmonary effort is normal. No respiratory distress.      Breath sounds: Normal breath sounds. No wheezing or rales.   Chest:      Chest wall: No tenderness.   Abdominal:      General: Bowel sounds are normal. There is no distension.      Palpations: Abdomen is soft.      Tenderness: There is no abdominal tenderness. There is no guarding or rebound.   Musculoskeletal: Normal range of motion.      Right lower leg: No edema.      Left lower leg: No edema.      Comments: Gait and coordination normal.  strong, equal. Upper and lower extremity strength normal.    Lymphadenopathy:      Cervical: No cervical adenopathy.   Skin:     General: Skin is warm and dry.      Capillary Refill: Capillary refill takes less than 2 seconds.      Findings: No rash.   Neurological:      General: No focal deficit present.      Mental Status: He is alert and oriented to person, place, and time.   Psychiatric:         Attention and Perception: Attention and perception normal.         Mood and Affect: Mood and affect normal.         Speech: Speech normal.         Behavior: Behavior normal.         Thought Content: Thought content normal.         Judgment: Judgment normal.         Assessment:       1. Mixed hyperlipidemia    2. Essential hypertension        Plan:       Mixed hyperlipidemia: continue lipitor. Last labs showed LDL at 134.    Essential hypertension: not to goal. Increase lisinopril to 20mg daily. Call me in one week with the BP  readings.    Other orders  -     lisinopriL (PRINIVIL,ZESTRIL) 20 MG tablet; Take 1 tablet (20 mg total) by mouth once daily.  Dispense: 30 tablet; Refill: 3  -     rosuvastatin (CRESTOR) 10 MG tablet; Take 1 tablet (10 mg total) by mouth once daily.  Dispense: 90 tablet; Refill: 3       Continue current medication  Take medications only as prescribed  Healthy diet, exercise  Adequate rest  Adequate hydration  Avoid allergens  Avoid excessive caffeine     one week follow up

## 2020-12-14 NOTE — PROGRESS NOTES
Individual Psychotherapy (PhD/LCSW)    Date: 12/10/2020    Site:  [facility]  [facility], [place]      Therapeutic Intervention: Met with patient.  Outpatient - Supportive psychotherapy 45 min - CPT Code 72267 and Outpatient - Interactive psychotherapy 45 min - CPT code 35748    Chief complaint/reason for encounter: depression     Interval history and content of current session: Reviewed chart.     Client reports that dog is doing well with treatment for heartworm. Clt notes that his coughing has been bad-allergist appointment scheduled. Therapist used BSP with client to process 12 step like program giving him bad advice to make financial amends and person took off with a bunch of money. Clt also processed how he misses the 12 step based program but has had negative experiences with 12 step programs in this area. Therapist encouraged clt to seek out further support and brought up group with [facility]. Clt hesitant however.     Treatment plan:  · Target symptoms: depression  · Why chosen therapy is appropriate versus another modality: relevant to diagnosis  · Outcome monitoring methods: self-report  · Therapeutic intervention type: insight oriented psychotherapy    Risk parameters:  Patient reports no suicidal ideation  Patient reports no homicidal ideation  Patient reports no self-injurious behavior  Patient reports no violent behavior    Verbal deficits: None    Patient's response to intervention:  The patient's response to intervention is accepting.    Progress toward goals and other mental status changes:  The patient's progress toward goals is good.    Diagnosis:   1. Depression, unspecified depression type        Plan:  individual psychotherapy    Return to clinic: 1 week    Length of Service (minutes): 45 minutes

## 2020-12-16 ENCOUNTER — OFFICE VISIT (OUTPATIENT)
Dept: PSYCHIATRY | Facility: CLINIC | Age: 70
End: 2020-12-16
Payer: MEDICARE

## 2020-12-16 DIAGNOSIS — F32.A DEPRESSION, UNSPECIFIED DEPRESSION TYPE: Primary | ICD-10-CM

## 2020-12-16 PROCEDURE — 90785 PSYTX COMPLEX INTERACTIVE: CPT | Mod: ,,, | Performed by: SOCIAL WORKER

## 2020-12-16 PROCEDURE — 90785 PR INTERACTIVE COMPLEXITY: ICD-10-PCS | Mod: ,,, | Performed by: SOCIAL WORKER

## 2020-12-16 PROCEDURE — 90834 PR PSYCHOTHERAPY W/PATIENT, 45 MIN: ICD-10-PCS | Mod: ,,, | Performed by: SOCIAL WORKER

## 2020-12-16 PROCEDURE — 90834 PSYTX W PT 45 MINUTES: CPT | Mod: ,,, | Performed by: SOCIAL WORKER

## 2020-12-16 NOTE — PROGRESS NOTES
"Individual Psychotherapy (PhD/LCSW)    Date: 12/16/2020    Site:  [facility]  [facility], [place]     Therapeutic Intervention: Met with patient.  Outpatient - Supportive psychotherapy 45 min - CPT Code 97617 and Outpatient - Interactive psychotherapy 45 min - CPT code 15390    Chief complaint/reason for encounter: depression     Interval history and content of current session: Reviewed chart.       Client noted to therapist that he wants to work on issues with mom. 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 Therapist used BSP and client processed how mom was not very present during his childhood. His grandmother was more of a mother figure. Clt reports that mom on the PTA and often traveling. Clt processed telling mom she was "not his real mom" when he was 6 years old. Clt also processed dad saying put down that he was like his mother when he did not like a Czech beer at 14 years old. Clt also processed going on a trip with parents that was going to bring them back together when he was 9 years old and save them from a divorce. Clt notes that parents almost  related to dad's drinking. Clt processed how mom had Diabetes and would use it passive aggressively toward dad, such as getting sick after they would argue and blaming him, which led dad to drink more.     Treatment plan:  · Target symptoms: depression  · Why chosen therapy is appropriate versus another modality: relevant to diagnosis  · Outcome monitoring methods: self-report  · Therapeutic intervention type: interactive psychotherapy    Risk parameters:  Patient reports no suicidal ideation  Patient reports no homicidal ideation  Patient reports no self-injurious behavior  Patient reports no violent behavior    Verbal deficits: None    Patient's response to intervention:  The patient's response to intervention is accepting.    Progress toward goals and other mental status " changes:  The patient's progress toward goals is good.    Diagnosis:   1. Depression, unspecified depression type        Plan:  individual psychotherapy    Return to clinic: 1 week    Length of Service (minutes): 45 minutes

## 2020-12-23 ENCOUNTER — OFFICE VISIT (OUTPATIENT)
Dept: PSYCHIATRY | Facility: CLINIC | Age: 70
End: 2020-12-23
Payer: MEDICARE

## 2020-12-23 DIAGNOSIS — F32.A DEPRESSION, UNSPECIFIED DEPRESSION TYPE: Primary | ICD-10-CM

## 2020-12-23 PROCEDURE — 90785 PR INTERACTIVE COMPLEXITY: ICD-10-PCS | Mod: ,,, | Performed by: SOCIAL WORKER

## 2020-12-23 PROCEDURE — 90785 PSYTX COMPLEX INTERACTIVE: CPT | Mod: ,,, | Performed by: SOCIAL WORKER

## 2020-12-23 PROCEDURE — 90834 PR PSYCHOTHERAPY W/PATIENT, 45 MIN: ICD-10-PCS | Mod: ,,, | Performed by: SOCIAL WORKER

## 2020-12-23 PROCEDURE — 90834 PSYTX W PT 45 MINUTES: CPT | Mod: ,,, | Performed by: SOCIAL WORKER

## 2020-12-28 ENCOUNTER — OFFICE VISIT (OUTPATIENT)
Dept: ALLERGY | Facility: CLINIC | Age: 70
End: 2020-12-28
Payer: MEDICARE

## 2020-12-28 ENCOUNTER — LAB VISIT (OUTPATIENT)
Dept: LAB | Facility: HOSPITAL | Age: 70
End: 2020-12-28
Attending: ALLERGY & IMMUNOLOGY
Payer: MEDICARE

## 2020-12-28 VITALS — BODY MASS INDEX: 31.89 KG/M2 | WEIGHT: 235.44 LBS | HEIGHT: 72 IN | OXYGEN SATURATION: 99 % | HEART RATE: 82 BPM

## 2020-12-28 DIAGNOSIS — J31.0 CHRONIC RHINITIS: ICD-10-CM

## 2020-12-28 DIAGNOSIS — R05.3 CHRONIC COUGH: ICD-10-CM

## 2020-12-28 DIAGNOSIS — R06.02 SHORTNESS OF BREATH: ICD-10-CM

## 2020-12-28 DIAGNOSIS — R05.3 CHRONIC COUGH: Primary | ICD-10-CM

## 2020-12-28 LAB
BASOPHILS # BLD AUTO: 0.03 K/UL (ref 0–0.2)
BASOPHILS NFR BLD: 0.2 % (ref 0–1.9)
DIFFERENTIAL METHOD: ABNORMAL
EOSINOPHIL # BLD AUTO: 0 K/UL (ref 0–0.5)
EOSINOPHIL NFR BLD: 0.1 % (ref 0–8)
ERYTHROCYTE [DISTWIDTH] IN BLOOD BY AUTOMATED COUNT: 14.4 % (ref 11.5–14.5)
HCT VFR BLD AUTO: 46.3 % (ref 40–54)
HGB BLD-MCNC: 14.6 G/DL (ref 14–18)
IGE SERPL-ACNC: 533 IU/ML (ref 0–100)
IMM GRANULOCYTES # BLD AUTO: 0.12 K/UL (ref 0–0.04)
IMM GRANULOCYTES NFR BLD AUTO: 0.8 % (ref 0–0.5)
LYMPHOCYTES # BLD AUTO: 1 K/UL (ref 1–4.8)
LYMPHOCYTES NFR BLD: 6.6 % (ref 18–48)
MCH RBC QN AUTO: 29.3 PG (ref 27–31)
MCHC RBC AUTO-ENTMCNC: 31.5 G/DL (ref 32–36)
MCV RBC AUTO: 93 FL (ref 82–98)
MONOCYTES # BLD AUTO: 0.3 K/UL (ref 0.3–1)
MONOCYTES NFR BLD: 2 % (ref 4–15)
NEUTROPHILS # BLD AUTO: 12.9 K/UL (ref 1.8–7.7)
NEUTROPHILS NFR BLD: 90.3 % (ref 38–73)
NRBC BLD-RTO: 0 /100 WBC
PLATELET # BLD AUTO: 168 K/UL (ref 150–350)
PMV BLD AUTO: 12.3 FL (ref 9.2–12.9)
RBC # BLD AUTO: 4.99 M/UL (ref 4.6–6.2)
WBC # BLD AUTO: 14.34 K/UL (ref 3.9–12.7)

## 2020-12-28 PROCEDURE — 99214 OFFICE O/P EST MOD 30 MIN: CPT | Mod: PBBFAC,PO | Performed by: ALLERGY & IMMUNOLOGY

## 2020-12-28 PROCEDURE — 82785 ASSAY OF IGE: CPT

## 2020-12-28 PROCEDURE — 86003 ALLG SPEC IGE CRUDE XTRC EA: CPT

## 2020-12-28 PROCEDURE — 86003 ALLG SPEC IGE CRUDE XTRC EA: CPT | Mod: 59

## 2020-12-28 PROCEDURE — 99204 OFFICE O/P NEW MOD 45 MIN: CPT | Mod: S$PBB,,, | Performed by: ALLERGY & IMMUNOLOGY

## 2020-12-28 PROCEDURE — 99999 PR PBB SHADOW E&M-EST. PATIENT-LVL IV: ICD-10-PCS | Mod: PBBFAC,,, | Performed by: ALLERGY & IMMUNOLOGY

## 2020-12-28 PROCEDURE — 85025 COMPLETE CBC W/AUTO DIFF WBC: CPT

## 2020-12-28 PROCEDURE — 36415 COLL VENOUS BLD VENIPUNCTURE: CPT | Mod: PO

## 2020-12-28 PROCEDURE — 99999 PR PBB SHADOW E&M-EST. PATIENT-LVL IV: CPT | Mod: PBBFAC,,, | Performed by: ALLERGY & IMMUNOLOGY

## 2020-12-28 PROCEDURE — 99204 PR OFFICE/OUTPT VISIT, NEW, LEVL IV, 45-59 MIN: ICD-10-PCS | Mod: S$PBB,,, | Performed by: ALLERGY & IMMUNOLOGY

## 2020-12-28 RX ORDER — PREDNISONE 10 MG/1
TABLET ORAL DAILY
Status: ON HOLD | COMMUNITY
End: 2021-03-01 | Stop reason: HOSPADM

## 2020-12-28 NOTE — PROGRESS NOTES
Subjective:       Patient ID: Chris Quiroz is a 70 y.o. male.    Chief Complaint:  Breathing Problem (pcp referred, allergies suspected)      71 yo man presents for consult from NP Neeru Bolivar for cough and rhinitis. He states he has a persistent cough and shortness of breath. He is from [place] and there was told had cedar fever and would get fluid in lungs and SOB so [place] moved [place]. Was fine for 6-7 months then started again with cough and SOB. Gets worse at night. He saw pulmonary Dr Meyer and note states has interstitial lung disease and chronic bronchitis and is on prednisone for this which has helped. Cough get worse at night. Also worse when talking on phone. Better now on prednisone. He has some PND and congestion. Not much sneeze or runny nose. Takes benadryl prn couple times per day. None since on steroids. He does notice when lies on left side cough gets worse. If on right side not as bad but he moves in sleep and will wake up coughing if on left side. Coughs more if he eats salt. He has a dog and does not seem tot  him. No difference inside or put. No season worse. Has HTN but no CAD> never had sinus surgery.       Environmental History: see history section for home environment  Review of Systems   Constitutional: Negative for activity change, appetite change, chills, fatigue, fever and unexpected weight change.   HENT: Positive for congestion, postnasal drip and rhinorrhea. Negative for ear discharge, ear pain, facial swelling, hearing loss, mouth sores, nosebleeds, sinus pressure, sneezing, sore throat, tinnitus, trouble swallowing and voice change.    Eyes: Negative for discharge, redness, itching and visual disturbance.   Respiratory: Positive for cough, chest tightness and shortness of breath. Negative for wheezing.    Cardiovascular: Negative for chest pain, palpitations and leg swelling.   Gastrointestinal: Negative for abdominal distention, abdominal pain, constipation, diarrhea,  nausea and vomiting.   Genitourinary: Negative for difficulty urinating.   Musculoskeletal: Negative for arthralgias, back pain, joint swelling and myalgias.   Skin: Negative for color change, pallor and rash.   Neurological: Negative for dizziness, tremors, speech difficulty, weakness, light-headedness and headaches.   Hematological: Negative for adenopathy. Does not bruise/bleed easily.   Psychiatric/Behavioral: Negative for agitation, confusion, decreased concentration and sleep disturbance. The patient is not nervous/anxious.         Objective:      Physical Exam  Vitals signs and nursing note reviewed.   Constitutional:       General: He is not in acute distress.     Appearance: He is well-developed.   HENT:      Head: Normocephalic and atraumatic.      Right Ear: Hearing, tympanic membrane, ear canal and external ear normal.      Left Ear: Hearing, tympanic membrane, ear canal and external ear normal.      Nose: No septal deviation, mucosal edema (pink turbinates) or rhinorrhea.      Mouth/Throat:      Pharynx: No uvula swelling.   Eyes:      General:         Right eye: No discharge.         Left eye: No discharge.      Conjunctiva/sclera: Conjunctivae normal.   Neck:      Musculoskeletal: Normal range of motion.      Thyroid: No thyromegaly.   Cardiovascular:      Rate and Rhythm: Normal rate and regular rhythm.      Heart sounds: Normal heart sounds. No murmur.   Pulmonary:      Effort: Pulmonary effort is normal. No respiratory distress.      Breath sounds: Normal breath sounds. No wheezing.   Abdominal:      General: There is no distension.      Palpations: Abdomen is soft.      Tenderness: There is no abdominal tenderness.   Musculoskeletal: Normal range of motion.   Lymphadenopathy:      Cervical: No cervical adenopathy.   Skin:     General: Skin is warm and dry.      Findings: No erythema or rash.   Neurological:      Mental Status: He is alert and oriented to person, place, and time.      Coordination:  Coordination normal.   Psychiatric:         Behavior: Behavior normal.         Thought Content: Thought content normal.         Judgment: Judgment normal.         Laboratory:   none performed   Assessment:       1. Chronic cough    2. Chronic rhinitis    3. Shortness of breath         Plan:       1. Advised cough can be allergic vs asthma vs pulmonary process vs reflux. Romero end immunocaps to eval for any allergic triggers. If negative needs to continue f/u with pulmonary  2. Phone review

## 2020-12-28 NOTE — PROGRESS NOTES
Individual Psychotherapy (PhD/LCSW)    Date: 12/23/2020    Site:  [facility] PSYCHIAT[facility]  [facility], [place]     Therapeutic Intervention: Met with patient.  Outpatient - Supportive psychotherapy 45 min - CPT Code 02689 and Outpatient - Interactive psychotherapy 45 min - CPT code 21086    Chief complaint/reason for encounter: depression     Interval history and content of current session: Reviewed chart.     Therapist used BSP with client, processing his relationship with his mom. Clt processed how he did not want to be like her and therapist pointed out how he never focused on health and diet due to not wanting to be like mom. Clt notes that he is still coughing and that he moved to [place] due to having such bad allergies in [place] but now he is having allergies here. Clt currently taking steroids to see if cough gets better. Therapist encouraged clt to make some healthy changes to diet to see if helpful in reducing cough.     Treatment plan:  · Target symptoms: depression  · Why chosen therapy is appropriate versus another modality: relevant to diagnosis  · Outcome monitoring methods: self-report  · Therapeutic intervention type: insight oriented psychotherapy    Risk parameters:  Patient reports no suicidal ideation  Patient reports no homicidal ideation  Patient reports no self-injurious behavior  Patient reports no violent behavior    Verbal deficits: None    Patient's response to intervention:  The patient's response to intervention is accepting, reluctant.    Progress toward goals and other mental status changes:  The patient's progress toward goals is good.    Diagnosis:   1. Depression, unspecified depression type        Plan:  individual psychotherapy    Return to clinic: 1 week    Length of Service (minutes): 45 minutes

## 2020-12-31 LAB
A ALTERNATA IGE QN: 0.16 KU/L
A FUMIGATUS IGE QN: 0.18 KU/L
ALLERGEN CHAETOMIUM GLOBOSUM IGE: 0.21 KU/L
ALLERGEN WALNUT TREE IGE: 0.31 KU/L
ALLERGEN WHITE PINE TREE IGE: 0.3 KU/L
BAHIA GRASS IGE QN: 0.33 KU/L
BALD CYPRESS IGE QN: <0.1 KU/L
BERMUDA GRASS IGE QN: 0.31 KU/L
C HERBARUM IGE QN: 0.17 KU/L
C LUNATA IGE QN: 0.27 KU/L
CAT DANDER IGE QN: 24.2 KU/L
CHAETOMIUM GLOB. CLASS: ABNORMAL
COMMON RAGWEED IGE QN: 1.16 KU/L
COTTONWOOD IGE QN: 0.2 KU/L
COW MILK IGE QN: 0.16 KU/L
D FARINAE IGE QN: 3.59 KU/L
D PTERONYSS IGE QN: 5.24 KU/L
DEPRECATED A ALTERNATA IGE RAST QL: ABNORMAL
DEPRECATED A FUMIGATUS IGE RAST QL: ABNORMAL
DEPRECATED BAHIA GRASS IGE RAST QL: ABNORMAL
DEPRECATED BALD CYPRESS IGE RAST QL: NORMAL
DEPRECATED BERMUDA GRASS IGE RAST QL: ABNORMAL
DEPRECATED C HERBARUM IGE RAST QL: ABNORMAL
DEPRECATED C LUNATA IGE RAST QL: ABNORMAL
DEPRECATED CAT DANDER IGE RAST QL: ABNORMAL
DEPRECATED COMMON RAGWEED IGE RAST QL: ABNORMAL
DEPRECATED COTTONWOOD IGE RAST QL: ABNORMAL
DEPRECATED COW MILK IGE RAST QL: ABNORMAL
DEPRECATED D FARINAE IGE RAST QL: ABNORMAL
DEPRECATED D PTERONYSS IGE RAST QL: ABNORMAL
DEPRECATED DOG DANDER IGE RAST QL: ABNORMAL
DEPRECATED EGG WHITE IGE RAST QL: ABNORMAL
DEPRECATED ELDER IGE RAST QL: ABNORMAL
DEPRECATED ENGL PLANTAIN IGE RAST QL: ABNORMAL
DEPRECATED HORSE DANDER IGE RAST QL: ABNORMAL
DEPRECATED JOHNSON GRASS IGE RAST QL: ABNORMAL
DEPRECATED LONDON PLANE IGE RAST QL: ABNORMAL
DEPRECATED MUGWORT IGE RAST QL: ABNORMAL
DEPRECATED OAT IGE RAST QL: ABNORMAL
DEPRECATED P NOTATUM IGE RAST QL: ABNORMAL
DEPRECATED PECAN/HICK TREE IGE RAST QL: ABNORMAL
DEPRECATED ROACH IGE RAST QL: ABNORMAL
DEPRECATED S ROSTRATA IGE RAST QL: ABNORMAL
DEPRECATED SALTWORT IGE RAST QL: ABNORMAL
DEPRECATED SILVER BIRCH IGE RAST QL: ABNORMAL
DEPRECATED SOYBEAN IGE RAST QL: ABNORMAL
DEPRECATED TIMOTHY IGE RAST QL: ABNORMAL
DEPRECATED WEST RAGWEED IGE RAST QL: ABNORMAL
DEPRECATED WHEAT IGE RAST QL: ABNORMAL
DEPRECATED WHITE OAK IGE RAST QL: ABNORMAL
DEPRECATED WILLOW IGE RAST QL: ABNORMAL
DOG DANDER IGE QN: 2.57 KU/L
EGG WHITE IGE QN: 0.25 KU/L
ELDER IGE QN: 0.36 KU/L
ENGL PLANTAIN IGE QN: 0.3 KU/L
HORSE DANDER IGE QN: 0.11 KU/L
JOHNSON GRASS IGE QN: 0.18 KU/L
LONDON PLANE IGE QN: 0.22 KU/L
MUGWORT IGE QN: 0.23 KU/L
OAT IGE QN: 0.29 KU/L
P NOTATUM IGE QN: 0.22 KU/L
PECAN/HICK TREE IGE QN: 0.32 KU/L
ROACH IGE QN: 1.92 KU/L
S ROSTRATA IGE QN: 0.15 KU/L
SALTWORT IGE QN: 0.51 KU/L
SILVER BIRCH IGE QN: 0.29 KU/L
SOYBEAN IGE QN: 0.19 KU/L
TIMOTHY IGE QN: 0.25 KU/L
WALNUT TREE CLASS: ABNORMAL
WEST RAGWEED IGE QN: 0.7 KU/L
WHEAT IGE QN: 1.52 KU/L
WHITE OAK IGE QN: 0.24 KU/L
WHITE PINE CLASS: ABNORMAL
WILLOW IGE QN: 0.24 KU/L

## 2021-01-04 ENCOUNTER — PATIENT MESSAGE (OUTPATIENT)
Dept: ALLERGY | Facility: CLINIC | Age: 71
End: 2021-01-04

## 2021-01-06 ENCOUNTER — OFFICE VISIT (OUTPATIENT)
Dept: PSYCHIATRY | Facility: CLINIC | Age: 71
End: 2021-01-06
Payer: MEDICARE

## 2021-01-06 DIAGNOSIS — F32.A DEPRESSION, UNSPECIFIED DEPRESSION TYPE: Primary | ICD-10-CM

## 2021-01-06 PROCEDURE — 90834 PR PSYCHOTHERAPY W/PATIENT, 45 MIN: ICD-10-PCS | Mod: ,,, | Performed by: SOCIAL WORKER

## 2021-01-06 PROCEDURE — 90834 PSYTX W PT 45 MINUTES: CPT | Mod: ,,, | Performed by: SOCIAL WORKER

## 2021-01-21 ENCOUNTER — OFFICE VISIT (OUTPATIENT)
Dept: PSYCHIATRY | Facility: CLINIC | Age: 71
End: 2021-01-21
Payer: MEDICARE

## 2021-01-21 DIAGNOSIS — F32.A DEPRESSION, UNSPECIFIED DEPRESSION TYPE: Primary | ICD-10-CM

## 2021-01-21 PROCEDURE — 90834 PSYTX W PT 45 MINUTES: CPT | Mod: ,,, | Performed by: SOCIAL WORKER

## 2021-01-21 PROCEDURE — 90834 PR PSYCHOTHERAPY W/PATIENT, 45 MIN: ICD-10-PCS | Mod: ,,, | Performed by: SOCIAL WORKER

## 2021-01-27 ENCOUNTER — OFFICE VISIT (OUTPATIENT)
Dept: PSYCHIATRY | Facility: CLINIC | Age: 71
End: 2021-01-27
Payer: MEDICARE

## 2021-01-27 DIAGNOSIS — F32.A DEPRESSION, UNSPECIFIED DEPRESSION TYPE: Primary | ICD-10-CM

## 2021-01-27 PROCEDURE — 90834 PSYTX W PT 45 MINUTES: CPT | Mod: ,,, | Performed by: SOCIAL WORKER

## 2021-01-27 PROCEDURE — 90834 PR PSYCHOTHERAPY W/PATIENT, 45 MIN: ICD-10-PCS | Mod: ,,, | Performed by: SOCIAL WORKER

## 2021-02-03 ENCOUNTER — OFFICE VISIT (OUTPATIENT)
Dept: PSYCHIATRY | Facility: CLINIC | Age: 71
End: 2021-02-03
Payer: MEDICARE

## 2021-02-03 DIAGNOSIS — F32.A DEPRESSION, UNSPECIFIED DEPRESSION TYPE: Primary | ICD-10-CM

## 2021-02-03 DIAGNOSIS — F41.9 ANXIETY: ICD-10-CM

## 2021-02-03 PROCEDURE — 90834 PR PSYCHOTHERAPY W/PATIENT, 45 MIN: ICD-10-PCS | Mod: ,,, | Performed by: SOCIAL WORKER

## 2021-02-03 PROCEDURE — 90834 PSYTX W PT 45 MINUTES: CPT | Mod: ,,, | Performed by: SOCIAL WORKER

## 2021-02-03 PROCEDURE — 90785 PR INTERACTIVE COMPLEXITY: ICD-10-PCS | Mod: ,,, | Performed by: SOCIAL WORKER

## 2021-02-03 PROCEDURE — 90785 PSYTX COMPLEX INTERACTIVE: CPT | Mod: ,,, | Performed by: SOCIAL WORKER

## 2021-02-08 ENCOUNTER — PATIENT MESSAGE (OUTPATIENT)
Dept: FAMILY MEDICINE | Facility: CLINIC | Age: 71
End: 2021-02-08

## 2021-02-08 ENCOUNTER — IMMUNIZATION (OUTPATIENT)
Dept: FAMILY MEDICINE | Facility: CLINIC | Age: 71
End: 2021-02-08
Payer: MEDICARE

## 2021-02-08 DIAGNOSIS — Z23 NEED FOR VACCINATION: Primary | ICD-10-CM

## 2021-02-08 PROCEDURE — 91300 COVID-19, MRNA, LNP-S, PF, 30 MCG/0.3 ML DOSE VACCINE: CPT | Mod: PBBFAC,PO

## 2021-02-11 ENCOUNTER — OFFICE VISIT (OUTPATIENT)
Dept: PSYCHIATRY | Facility: CLINIC | Age: 71
End: 2021-02-11
Payer: MEDICARE

## 2021-02-11 ENCOUNTER — PATIENT MESSAGE (OUTPATIENT)
Dept: FAMILY MEDICINE | Facility: CLINIC | Age: 71
End: 2021-02-11

## 2021-02-11 DIAGNOSIS — F32.A DEPRESSION, UNSPECIFIED DEPRESSION TYPE: Primary | ICD-10-CM

## 2021-02-11 PROCEDURE — 90834 PSYTX W PT 45 MINUTES: CPT | Mod: ,,, | Performed by: SOCIAL WORKER

## 2021-02-11 PROCEDURE — 90785 PR INTERACTIVE COMPLEXITY: ICD-10-PCS | Mod: ,,, | Performed by: SOCIAL WORKER

## 2021-02-11 PROCEDURE — 90785 PSYTX COMPLEX INTERACTIVE: CPT | Mod: ,,, | Performed by: SOCIAL WORKER

## 2021-02-11 PROCEDURE — 90834 PR PSYCHOTHERAPY W/PATIENT, 45 MIN: ICD-10-PCS | Mod: ,,, | Performed by: SOCIAL WORKER

## 2021-02-18 ENCOUNTER — OFFICE VISIT (OUTPATIENT)
Dept: PSYCHIATRY | Facility: CLINIC | Age: 71
End: 2021-02-18
Payer: MEDICARE

## 2021-02-18 DIAGNOSIS — F32.A DEPRESSION, UNSPECIFIED DEPRESSION TYPE: Primary | ICD-10-CM

## 2021-02-18 PROCEDURE — 90785 PR INTERACTIVE COMPLEXITY: ICD-10-PCS | Mod: ,,, | Performed by: SOCIAL WORKER

## 2021-02-18 PROCEDURE — 90834 PR PSYCHOTHERAPY W/PATIENT, 45 MIN: ICD-10-PCS | Mod: ,,, | Performed by: SOCIAL WORKER

## 2021-02-18 PROCEDURE — 90785 PSYTX COMPLEX INTERACTIVE: CPT | Mod: ,,, | Performed by: SOCIAL WORKER

## 2021-02-18 PROCEDURE — 90834 PSYTX W PT 45 MINUTES: CPT | Mod: ,,, | Performed by: SOCIAL WORKER

## 2021-02-25 ENCOUNTER — OFFICE VISIT (OUTPATIENT)
Dept: PSYCHIATRY | Facility: CLINIC | Age: 71
End: 2021-02-25
Payer: MEDICARE

## 2021-02-25 VITALS
HEART RATE: 83 BPM | DIASTOLIC BLOOD PRESSURE: 86 MMHG | WEIGHT: 228.38 LBS | HEIGHT: 72 IN | SYSTOLIC BLOOD PRESSURE: 133 MMHG | BODY MASS INDEX: 30.93 KG/M2

## 2021-02-25 DIAGNOSIS — F33.1 MODERATE EPISODE OF RECURRENT MAJOR DEPRESSIVE DISORDER: ICD-10-CM

## 2021-02-25 DIAGNOSIS — F10.21 ALCOHOL USE DISORDER, SEVERE, IN EARLY REMISSION: Primary | ICD-10-CM

## 2021-02-25 PROCEDURE — 99214 PR OFFICE/OUTPT VISIT, EST, LEVL IV, 30-39 MIN: ICD-10-PCS | Mod: S$PBB,,, | Performed by: PSYCHOLOGIST

## 2021-02-25 PROCEDURE — 99999 PR PBB SHADOW E&M-EST. PATIENT-LVL III: CPT | Mod: PBBFAC,,, | Performed by: PSYCHOLOGIST

## 2021-02-25 PROCEDURE — 99214 OFFICE O/P EST MOD 30 MIN: CPT | Mod: S$PBB,,, | Performed by: PSYCHOLOGIST

## 2021-02-25 PROCEDURE — 99999 PR PBB SHADOW E&M-EST. PATIENT-LVL III: ICD-10-PCS | Mod: PBBFAC,,, | Performed by: PSYCHOLOGIST

## 2021-02-25 PROCEDURE — 99213 OFFICE O/P EST LOW 20 MIN: CPT | Mod: PBBFAC,PO | Performed by: PSYCHOLOGIST

## 2021-02-25 RX ORDER — VORTIOXETINE 20 MG/1
20 TABLET, FILM COATED ORAL DAILY
Qty: 90 TABLET | Refills: 1 | Status: SHIPPED | OUTPATIENT
Start: 2021-02-25 | End: 2021-05-04 | Stop reason: ALTCHOICE

## 2021-02-27 PROBLEM — J44.9 COPD (CHRONIC OBSTRUCTIVE PULMONARY DISEASE): Status: ACTIVE | Noted: 2021-02-27

## 2021-02-27 PROBLEM — K25.0 ACUTE GASTRIC ULCER WITH HEMORRHAGE: Status: ACTIVE | Noted: 2021-02-27

## 2021-02-27 PROBLEM — D62 ACUTE BLOOD LOSS ANEMIA: Status: ACTIVE | Noted: 2021-02-27

## 2021-02-27 PROBLEM — K92.2 UPPER GI BLEED: Status: ACTIVE | Noted: 2021-02-27

## 2021-02-28 PROBLEM — D50.0 IRON DEFICIENCY ANEMIA DUE TO CHRONIC BLOOD LOSS: Status: ACTIVE | Noted: 2021-02-27

## 2021-03-03 ENCOUNTER — IMMUNIZATION (OUTPATIENT)
Dept: FAMILY MEDICINE | Facility: CLINIC | Age: 71
End: 2021-03-03
Payer: MEDICARE

## 2021-03-03 DIAGNOSIS — Z23 NEED FOR VACCINATION: Primary | ICD-10-CM

## 2021-03-03 PROCEDURE — 0002A COVID-19, MRNA, LNP-S, PF, 30 MCG/0.3 ML DOSE VACCINE: CPT | Mod: PBBFAC,PO

## 2021-03-03 PROCEDURE — 91300 COVID-19, MRNA, LNP-S, PF, 30 MCG/0.3 ML DOSE VACCINE: CPT | Mod: PBBFAC,PO

## 2021-03-04 ENCOUNTER — OFFICE VISIT (OUTPATIENT)
Dept: PSYCHIATRY | Facility: CLINIC | Age: 71
End: 2021-03-04
Payer: MEDICARE

## 2021-03-04 DIAGNOSIS — F32.A DEPRESSION, UNSPECIFIED DEPRESSION TYPE: Primary | ICD-10-CM

## 2021-03-04 PROCEDURE — 99999 PR PBB SHADOW E&M-EST. PATIENT-LVL II: CPT | Mod: PBBFAC,,, | Performed by: SOCIAL WORKER

## 2021-03-04 PROCEDURE — 90834 PSYTX W PT 45 MINUTES: CPT | Mod: ,,, | Performed by: SOCIAL WORKER

## 2021-03-04 PROCEDURE — 90834 PR PSYCHOTHERAPY W/PATIENT, 45 MIN: ICD-10-PCS | Mod: ,,, | Performed by: SOCIAL WORKER

## 2021-03-04 PROCEDURE — 99999 PR PBB SHADOW E&M-EST. PATIENT-LVL II: ICD-10-PCS | Mod: PBBFAC,,, | Performed by: SOCIAL WORKER

## 2021-03-04 PROCEDURE — 99212 OFFICE O/P EST SF 10 MIN: CPT | Mod: PBBFAC,PO | Performed by: SOCIAL WORKER

## 2021-03-11 ENCOUNTER — OFFICE VISIT (OUTPATIENT)
Dept: PSYCHIATRY | Facility: CLINIC | Age: 71
End: 2021-03-11
Payer: MEDICARE

## 2021-03-11 DIAGNOSIS — F32.A DEPRESSION, UNSPECIFIED DEPRESSION TYPE: Primary | ICD-10-CM

## 2021-03-11 PROCEDURE — 99999 PR PBB SHADOW E&M-EST. PATIENT-LVL II: CPT | Mod: PBBFAC,,, | Performed by: SOCIAL WORKER

## 2021-03-11 PROCEDURE — 90834 PR PSYCHOTHERAPY W/PATIENT, 45 MIN: ICD-10-PCS | Mod: ,,, | Performed by: SOCIAL WORKER

## 2021-03-11 PROCEDURE — 90834 PSYTX W PT 45 MINUTES: CPT | Mod: ,,, | Performed by: SOCIAL WORKER

## 2021-03-11 PROCEDURE — 99212 OFFICE O/P EST SF 10 MIN: CPT | Mod: PBBFAC,PO | Performed by: SOCIAL WORKER

## 2021-03-11 PROCEDURE — 99999 PR PBB SHADOW E&M-EST. PATIENT-LVL II: ICD-10-PCS | Mod: PBBFAC,,, | Performed by: SOCIAL WORKER

## 2021-03-15 ENCOUNTER — OFFICE VISIT (OUTPATIENT)
Dept: FAMILY MEDICINE | Facility: CLINIC | Age: 71
End: 2021-03-15
Payer: MEDICARE

## 2021-03-15 VITALS
DIASTOLIC BLOOD PRESSURE: 74 MMHG | WEIGHT: 231.56 LBS | SYSTOLIC BLOOD PRESSURE: 128 MMHG | TEMPERATURE: 97 F | OXYGEN SATURATION: 97 % | BODY MASS INDEX: 31.36 KG/M2 | HEART RATE: 95 BPM | HEIGHT: 72 IN | RESPIRATION RATE: 18 BRPM

## 2021-03-15 DIAGNOSIS — F33.1 MODERATE EPISODE OF RECURRENT MAJOR DEPRESSIVE DISORDER: ICD-10-CM

## 2021-03-15 DIAGNOSIS — Z09 HOSPITAL DISCHARGE FOLLOW-UP: Primary | ICD-10-CM

## 2021-03-15 DIAGNOSIS — J44.9 CHRONIC OBSTRUCTIVE PULMONARY DISEASE, UNSPECIFIED COPD TYPE: ICD-10-CM

## 2021-03-15 DIAGNOSIS — I10 ESSENTIAL HYPERTENSION: ICD-10-CM

## 2021-03-15 DIAGNOSIS — R06.02 SOB (SHORTNESS OF BREATH) ON EXERTION: ICD-10-CM

## 2021-03-15 DIAGNOSIS — K92.2 GASTROINTESTINAL HEMORRHAGE, UNSPECIFIED GASTROINTESTINAL HEMORRHAGE TYPE: ICD-10-CM

## 2021-03-15 PROCEDURE — 85025 COMPLETE CBC W/AUTO DIFF WBC: CPT | Performed by: NURSE PRACTITIONER

## 2021-03-15 PROCEDURE — 36415 PR COLLECTION VENOUS BLOOD,VENIPUNCTURE: ICD-10-PCS | Mod: S$GLB,,, | Performed by: NURSE PRACTITIONER

## 2021-03-15 PROCEDURE — 99214 PR OFFICE/OUTPT VISIT, EST, LEVL IV, 30-39 MIN: ICD-10-PCS | Mod: S$GLB,,, | Performed by: NURSE PRACTITIONER

## 2021-03-15 PROCEDURE — 99214 OFFICE O/P EST MOD 30 MIN: CPT | Mod: S$GLB,,, | Performed by: NURSE PRACTITIONER

## 2021-03-15 PROCEDURE — 36415 COLL VENOUS BLD VENIPUNCTURE: CPT | Mod: S$GLB,,, | Performed by: NURSE PRACTITIONER

## 2021-03-16 LAB
BASOPHILS # BLD AUTO: 0.12 K/UL (ref 0–0.2)
BASOPHILS NFR BLD: 1.3 % (ref 0–1.9)
DIFFERENTIAL METHOD: ABNORMAL
EOSINOPHIL # BLD AUTO: 0.8 K/UL (ref 0–0.5)
EOSINOPHIL NFR BLD: 8.8 % (ref 0–8)
ERYTHROCYTE [DISTWIDTH] IN BLOOD BY AUTOMATED COUNT: 15 % (ref 11.5–14.5)
HCT VFR BLD AUTO: 36.2 % (ref 40–54)
HGB BLD-MCNC: 11.2 G/DL (ref 14–18)
IMM GRANULOCYTES # BLD AUTO: 0.04 K/UL (ref 0–0.04)
IMM GRANULOCYTES NFR BLD AUTO: 0.4 % (ref 0–0.5)
LYMPHOCYTES # BLD AUTO: 1.6 K/UL (ref 1–4.8)
LYMPHOCYTES NFR BLD: 17.3 % (ref 18–48)
MCH RBC QN AUTO: 29.7 PG (ref 27–31)
MCHC RBC AUTO-ENTMCNC: 30.9 G/DL (ref 32–36)
MCV RBC AUTO: 96 FL (ref 82–98)
MONOCYTES # BLD AUTO: 0.6 K/UL (ref 0.3–1)
MONOCYTES NFR BLD: 7 % (ref 4–15)
NEUTROPHILS # BLD AUTO: 5.9 K/UL (ref 1.8–7.7)
NEUTROPHILS NFR BLD: 65.2 % (ref 38–73)
NRBC BLD-RTO: 0 /100 WBC
PLATELET # BLD AUTO: 250 K/UL (ref 150–350)
PMV BLD AUTO: 12 FL (ref 9.2–12.9)
RBC # BLD AUTO: 3.77 M/UL (ref 4.6–6.2)
WBC # BLD AUTO: 9.04 K/UL (ref 3.9–12.7)

## 2021-03-17 ENCOUNTER — PATIENT MESSAGE (OUTPATIENT)
Dept: FAMILY MEDICINE | Facility: CLINIC | Age: 71
End: 2021-03-17

## 2021-03-18 ENCOUNTER — OFFICE VISIT (OUTPATIENT)
Dept: PSYCHIATRY | Facility: CLINIC | Age: 71
End: 2021-03-18
Payer: MEDICARE

## 2021-03-18 DIAGNOSIS — F33.1 MODERATE EPISODE OF RECURRENT MAJOR DEPRESSIVE DISORDER: Primary | ICD-10-CM

## 2021-03-18 PROCEDURE — 90834 PSYTX W PT 45 MINUTES: CPT | Mod: ,,, | Performed by: SOCIAL WORKER

## 2021-03-18 PROCEDURE — 99999 PR PBB SHADOW E&M-EST. PATIENT-LVL II: ICD-10-PCS | Mod: PBBFAC,,, | Performed by: SOCIAL WORKER

## 2021-03-18 PROCEDURE — 99999 PR PBB SHADOW E&M-EST. PATIENT-LVL II: CPT | Mod: PBBFAC,,, | Performed by: SOCIAL WORKER

## 2021-03-18 PROCEDURE — 90834 PR PSYCHOTHERAPY W/PATIENT, 45 MIN: ICD-10-PCS | Mod: ,,, | Performed by: SOCIAL WORKER

## 2021-03-18 PROCEDURE — 90785 PR INTERACTIVE COMPLEXITY: ICD-10-PCS | Mod: ,,, | Performed by: SOCIAL WORKER

## 2021-03-18 PROCEDURE — 99212 OFFICE O/P EST SF 10 MIN: CPT | Mod: PBBFAC,PO | Performed by: SOCIAL WORKER

## 2021-03-18 PROCEDURE — 90785 PSYTX COMPLEX INTERACTIVE: CPT | Mod: ,,, | Performed by: SOCIAL WORKER

## 2021-03-25 ENCOUNTER — OFFICE VISIT (OUTPATIENT)
Dept: PSYCHIATRY | Facility: CLINIC | Age: 71
End: 2021-03-25
Payer: MEDICARE

## 2021-03-25 DIAGNOSIS — F32.A DEPRESSION, UNSPECIFIED DEPRESSION TYPE: Primary | ICD-10-CM

## 2021-03-25 PROCEDURE — 99999 PR PBB SHADOW E&M-EST. PATIENT-LVL II: ICD-10-PCS | Mod: PBBFAC,,, | Performed by: SOCIAL WORKER

## 2021-03-25 PROCEDURE — 99999 PR PBB SHADOW E&M-EST. PATIENT-LVL II: CPT | Mod: PBBFAC,,, | Performed by: SOCIAL WORKER

## 2021-03-25 PROCEDURE — 99212 OFFICE O/P EST SF 10 MIN: CPT | Mod: PBBFAC,PO | Performed by: SOCIAL WORKER

## 2021-03-25 PROCEDURE — 90834 PR PSYCHOTHERAPY W/PATIENT, 45 MIN: ICD-10-PCS | Mod: ,,, | Performed by: SOCIAL WORKER

## 2021-03-25 PROCEDURE — 90834 PSYTX W PT 45 MINUTES: CPT | Mod: ,,, | Performed by: SOCIAL WORKER

## 2021-04-01 ENCOUNTER — OFFICE VISIT (OUTPATIENT)
Dept: PSYCHIATRY | Facility: CLINIC | Age: 71
End: 2021-04-01
Payer: MEDICARE

## 2021-04-01 DIAGNOSIS — F33.1 MODERATE EPISODE OF RECURRENT MAJOR DEPRESSIVE DISORDER: Primary | ICD-10-CM

## 2021-04-01 PROCEDURE — 99999 PR PBB SHADOW E&M-EST. PATIENT-LVL II: ICD-10-PCS | Mod: PBBFAC,,, | Performed by: SOCIAL WORKER

## 2021-04-01 PROCEDURE — 90834 PR PSYCHOTHERAPY W/PATIENT, 45 MIN: ICD-10-PCS | Mod: ,,, | Performed by: SOCIAL WORKER

## 2021-04-01 PROCEDURE — 90834 PSYTX W PT 45 MINUTES: CPT | Mod: ,,, | Performed by: SOCIAL WORKER

## 2021-04-01 PROCEDURE — 99212 OFFICE O/P EST SF 10 MIN: CPT | Mod: PBBFAC,PO | Performed by: SOCIAL WORKER

## 2021-04-01 PROCEDURE — 99999 PR PBB SHADOW E&M-EST. PATIENT-LVL II: CPT | Mod: PBBFAC,,, | Performed by: SOCIAL WORKER

## 2021-04-06 ENCOUNTER — OFFICE VISIT (OUTPATIENT)
Dept: CARDIOLOGY | Facility: CLINIC | Age: 71
End: 2021-04-06
Payer: MEDICARE

## 2021-04-06 VITALS
SYSTOLIC BLOOD PRESSURE: 142 MMHG | HEART RATE: 73 BPM | DIASTOLIC BLOOD PRESSURE: 81 MMHG | HEIGHT: 72 IN | BODY MASS INDEX: 30.64 KG/M2 | WEIGHT: 226.19 LBS

## 2021-04-06 DIAGNOSIS — I10 ESSENTIAL HYPERTENSION: Primary | ICD-10-CM

## 2021-04-06 DIAGNOSIS — E78.2 MIXED HYPERLIPIDEMIA: ICD-10-CM

## 2021-04-06 DIAGNOSIS — R06.02 SOB (SHORTNESS OF BREATH) ON EXERTION: ICD-10-CM

## 2021-04-06 PROCEDURE — 99999 PR PBB SHADOW E&M-EST. PATIENT-LVL IV: CPT | Mod: PBBFAC,,, | Performed by: INTERNAL MEDICINE

## 2021-04-06 PROCEDURE — 99214 OFFICE O/P EST MOD 30 MIN: CPT | Mod: PBBFAC,PO | Performed by: INTERNAL MEDICINE

## 2021-04-06 PROCEDURE — 99999 PR PBB SHADOW E&M-EST. PATIENT-LVL IV: ICD-10-PCS | Mod: PBBFAC,,, | Performed by: INTERNAL MEDICINE

## 2021-04-06 PROCEDURE — 99204 OFFICE O/P NEW MOD 45 MIN: CPT | Mod: S$PBB,,, | Performed by: INTERNAL MEDICINE

## 2021-04-06 PROCEDURE — 99204 PR OFFICE/OUTPT VISIT, NEW, LEVL IV, 45-59 MIN: ICD-10-PCS | Mod: S$PBB,,, | Performed by: INTERNAL MEDICINE

## 2021-04-07 ENCOUNTER — TELEPHONE (OUTPATIENT)
Dept: CARDIOLOGY | Facility: CLINIC | Age: 71
End: 2021-04-07

## 2021-04-08 ENCOUNTER — OFFICE VISIT (OUTPATIENT)
Dept: PSYCHIATRY | Facility: CLINIC | Age: 71
End: 2021-04-08
Payer: MEDICARE

## 2021-04-08 DIAGNOSIS — F33.1 MODERATE EPISODE OF RECURRENT MAJOR DEPRESSIVE DISORDER: Primary | ICD-10-CM

## 2021-04-08 PROCEDURE — 90834 PR PSYCHOTHERAPY W/PATIENT, 45 MIN: ICD-10-PCS | Mod: ,,, | Performed by: SOCIAL WORKER

## 2021-04-08 PROCEDURE — 99212 OFFICE O/P EST SF 10 MIN: CPT | Mod: PBBFAC,PO | Performed by: SOCIAL WORKER

## 2021-04-08 PROCEDURE — 99999 PR PBB SHADOW E&M-EST. PATIENT-LVL II: ICD-10-PCS | Mod: PBBFAC,,, | Performed by: SOCIAL WORKER

## 2021-04-08 PROCEDURE — 99999 PR PBB SHADOW E&M-EST. PATIENT-LVL II: CPT | Mod: PBBFAC,,, | Performed by: SOCIAL WORKER

## 2021-04-08 PROCEDURE — 90834 PSYTX W PT 45 MINUTES: CPT | Mod: ,,, | Performed by: SOCIAL WORKER

## 2021-04-15 ENCOUNTER — PATIENT MESSAGE (OUTPATIENT)
Dept: PSYCHIATRY | Facility: CLINIC | Age: 71
End: 2021-04-15

## 2021-04-22 ENCOUNTER — OFFICE VISIT (OUTPATIENT)
Dept: PSYCHIATRY | Facility: CLINIC | Age: 71
End: 2021-04-22
Payer: MEDICARE

## 2021-04-22 DIAGNOSIS — F33.1 MODERATE EPISODE OF RECURRENT MAJOR DEPRESSIVE DISORDER: Primary | ICD-10-CM

## 2021-04-22 PROCEDURE — 90834 PR PSYCHOTHERAPY W/PATIENT, 45 MIN: ICD-10-PCS | Mod: ,,, | Performed by: SOCIAL WORKER

## 2021-04-22 PROCEDURE — 99999 PR PBB SHADOW E&M-EST. PATIENT-LVL II: ICD-10-PCS | Mod: PBBFAC,,, | Performed by: SOCIAL WORKER

## 2021-04-22 PROCEDURE — 99212 OFFICE O/P EST SF 10 MIN: CPT | Mod: PBBFAC,PO | Performed by: SOCIAL WORKER

## 2021-04-22 PROCEDURE — 90834 PSYTX W PT 45 MINUTES: CPT | Mod: ,,, | Performed by: SOCIAL WORKER

## 2021-04-22 PROCEDURE — 99999 PR PBB SHADOW E&M-EST. PATIENT-LVL II: CPT | Mod: PBBFAC,,, | Performed by: SOCIAL WORKER

## 2021-04-27 ENCOUNTER — OFFICE VISIT (OUTPATIENT)
Dept: PSYCHIATRY | Facility: CLINIC | Age: 71
End: 2021-04-27
Payer: MEDICARE

## 2021-04-27 DIAGNOSIS — F33.1 MODERATE EPISODE OF RECURRENT MAJOR DEPRESSIVE DISORDER: Primary | ICD-10-CM

## 2021-04-27 PROCEDURE — 99212 OFFICE O/P EST SF 10 MIN: CPT | Mod: PBBFAC,PO | Performed by: SOCIAL WORKER

## 2021-04-27 PROCEDURE — 99999 PR PBB SHADOW E&M-EST. PATIENT-LVL II: CPT | Mod: PBBFAC,,, | Performed by: SOCIAL WORKER

## 2021-04-27 PROCEDURE — 90834 PR PSYCHOTHERAPY W/PATIENT, 45 MIN: ICD-10-PCS | Mod: ,,, | Performed by: SOCIAL WORKER

## 2021-04-27 PROCEDURE — 90834 PSYTX W PT 45 MINUTES: CPT | Mod: ,,, | Performed by: SOCIAL WORKER

## 2021-04-27 PROCEDURE — 99999 PR PBB SHADOW E&M-EST. PATIENT-LVL II: ICD-10-PCS | Mod: PBBFAC,,, | Performed by: SOCIAL WORKER

## 2021-04-28 ENCOUNTER — OFFICE VISIT (OUTPATIENT)
Dept: ALLERGY | Facility: CLINIC | Age: 71
End: 2021-04-28
Payer: MEDICARE

## 2021-04-28 VITALS — HEART RATE: 82 BPM | BODY MASS INDEX: 30.1 KG/M2 | WEIGHT: 222.25 LBS | OXYGEN SATURATION: 94 % | HEIGHT: 72 IN

## 2021-04-28 DIAGNOSIS — I10 ESSENTIAL HYPERTENSION: ICD-10-CM

## 2021-04-28 DIAGNOSIS — J30.9 CHRONIC ALLERGIC RHINITIS: ICD-10-CM

## 2021-04-28 DIAGNOSIS — K21.9 GASTROESOPHAGEAL REFLUX DISEASE, UNSPECIFIED WHETHER ESOPHAGITIS PRESENT: ICD-10-CM

## 2021-04-28 DIAGNOSIS — R05.3 CHRONIC COUGH: Primary | ICD-10-CM

## 2021-04-28 DIAGNOSIS — R06.02 SHORTNESS OF BREATH: ICD-10-CM

## 2021-04-28 PROCEDURE — 99214 OFFICE O/P EST MOD 30 MIN: CPT | Mod: S$PBB,,, | Performed by: ALLERGY & IMMUNOLOGY

## 2021-04-28 PROCEDURE — 99214 PR OFFICE/OUTPT VISIT, EST, LEVL IV, 30-39 MIN: ICD-10-PCS | Mod: S$PBB,,, | Performed by: ALLERGY & IMMUNOLOGY

## 2021-04-28 PROCEDURE — 99213 OFFICE O/P EST LOW 20 MIN: CPT | Mod: PBBFAC,PO | Performed by: ALLERGY & IMMUNOLOGY

## 2021-04-28 PROCEDURE — 99999 PR PBB SHADOW E&M-EST. PATIENT-LVL III: CPT | Mod: PBBFAC,,, | Performed by: ALLERGY & IMMUNOLOGY

## 2021-04-28 PROCEDURE — 99999 PR PBB SHADOW E&M-EST. PATIENT-LVL III: ICD-10-PCS | Mod: PBBFAC,,, | Performed by: ALLERGY & IMMUNOLOGY

## 2021-04-28 RX ORDER — AZELASTINE 1 MG/ML
2 SPRAY, METERED NASAL 2 TIMES DAILY
Qty: 30 ML | Refills: 12 | Status: SHIPPED | OUTPATIENT
Start: 2021-04-28 | End: 2021-11-04 | Stop reason: ALTCHOICE

## 2021-04-28 RX ORDER — MONTELUKAST SODIUM 10 MG/1
10 TABLET ORAL NIGHTLY
Qty: 30 TABLET | Refills: 12 | Status: SHIPPED | OUTPATIENT
Start: 2021-04-28 | End: 2021-05-28

## 2021-04-29 ENCOUNTER — CLINICAL SUPPORT (OUTPATIENT)
Dept: CARDIOLOGY | Facility: CLINIC | Age: 71
End: 2021-04-29
Attending: INTERNAL MEDICINE
Payer: MEDICARE

## 2021-04-29 ENCOUNTER — HOSPITAL ENCOUNTER (OUTPATIENT)
Dept: RADIOLOGY | Facility: HOSPITAL | Age: 71
Discharge: HOME | End: 2021-04-29
Attending: INTERNAL MEDICINE
Payer: MEDICARE

## 2021-04-29 VITALS — HEIGHT: 72 IN | BODY MASS INDEX: 30.07 KG/M2 | WEIGHT: 222 LBS

## 2021-04-29 VITALS — BODY MASS INDEX: 32.35 KG/M2 | WEIGHT: 226 LBS | HEIGHT: 70 IN

## 2021-04-29 DIAGNOSIS — E78.2 MIXED HYPERLIPIDEMIA: ICD-10-CM

## 2021-04-29 DIAGNOSIS — R06.02 SOB (SHORTNESS OF BREATH) ON EXERTION: ICD-10-CM

## 2021-04-29 DIAGNOSIS — I10 ESSENTIAL HYPERTENSION: ICD-10-CM

## 2021-04-29 LAB
ASCENDING AORTA: 3.37 CM
AV INDEX (PROSTH): 1.08
AV MEAN GRADIENT: 2 MMHG
AV PEAK GRADIENT: 4 MMHG
AV VALVE AREA: 5.84 CM2
AV VELOCITY RATIO: 0.97
BSA FOR ECHO PROCEDURE: 2.26 M2
CV ECHO LV RWT: 0.43 CM
CV PHARM DOSE: 0.4 MG
CV STRESS BASE HR: 67 BPM
DIASTOLIC BLOOD PRESSURE: 75 MMHG
DOP CALC AO PEAK VEL: 1.02 M/S
DOP CALC AO VTI: 21.9 CM
DOP CALC LVOT AREA: 5.4 CM2
DOP CALC LVOT DIAMETER: 2.63 CM
DOP CALC LVOT PEAK VEL: 0.99 M/S
DOP CALC LVOT STROKE VOLUME: 127.93 CM3
DOP CALCLVOT PEAK VEL VTI: 23.56 CM
E WAVE DECELERATION TIME: 323.55 MSEC
E/A RATIO: 0.78
E/E' RATIO: 8.17 M/S
ECHO LV POSTERIOR WALL: 1.08 CM (ref 0.6–1.1)
EJECTION FRACTION: 55 %
FRACTIONAL SHORTENING: 30 % (ref 28–44)
INTERVENTRICULAR SEPTUM: 1.12 CM (ref 0.6–1.1)
IVRT: 82.78 MSEC
LA MAJOR: 4.62 CM
LA MINOR: 5.2 CM
LA WIDTH: 2.59 CM
LEFT ATRIUM SIZE: 4.64 CM
LEFT ATRIUM VOLUME INDEX: 22.4 ML/M2
LEFT ATRIUM VOLUME: 49.98 CM3
LEFT INTERNAL DIMENSION IN SYSTOLE: 3.55 CM (ref 2.1–4)
LEFT VENTRICLE DIASTOLIC VOLUME INDEX: 53.92 ML/M2
LEFT VENTRICLE DIASTOLIC VOLUME: 120.24 ML
LEFT VENTRICLE MASS INDEX: 94 G/M2
LEFT VENTRICLE SYSTOLIC VOLUME INDEX: 23.6 ML/M2
LEFT VENTRICLE SYSTOLIC VOLUME: 52.56 ML
LEFT VENTRICULAR INTERNAL DIMENSION IN DIASTOLE: 5.04 CM (ref 3.5–6)
LEFT VENTRICULAR MASS: 209.83 G
LV LATERAL E/E' RATIO: 7 M/S
LV SEPTAL E/E' RATIO: 9.8 M/S
MV PEAK A VEL: 0.63 M/S
MV PEAK E VEL: 0.49 M/S
NUC REST EJECTION FRACTION: 58
OHS CV CPX 1 MINUTE RECOVERY HEART RATE: 95 BPM
OHS CV CPX 85 PERCENT MAX PREDICTED HEART RATE MALE: 129
OHS CV CPX MAX PREDICTED HEART RATE: 152
OHS CV CPX PATIENT IS FEMALE: 0
OHS CV CPX PATIENT IS MALE: 1
OHS CV CPX PEAK DIASTOLIC BLOOD PRESSURE: 75 MMHG
OHS CV CPX PEAK HEAR RATE: 96 BPM
OHS CV CPX PEAK RATE PRESSURE PRODUCT: NORMAL
OHS CV CPX PEAK SYSTOLIC BLOOD PRESSURE: 146 MMHG
OHS CV CPX PERCENT MAX PREDICTED HEART RATE ACHIEVED: 63
OHS CV CPX RATE PRESSURE PRODUCT PRESENTING: 9782
PISA MRMAX VEL: 0.04 M/S
RA MAJOR: 5.05 CM
RA PRESSURE: 3 MMHG
RA WIDTH: 3.02 CM
RIGHT VENTRICULAR END-DIASTOLIC DIMENSION: 4.24 CM
RV TISSUE DOPPLER FREE WALL SYSTOLIC VELOCITY 1 (APICAL 4 CHAMBER VIEW): 9.6 CM/S
SINUS: 3.33 CM
STJ: 3.34 CM
SYSTOLIC BLOOD PRESSURE: 146 MMHG
TDI LATERAL: 0.07 M/S
TDI SEPTAL: 0.05 M/S
TDI: 0.06 M/S
TRICUSPID ANNULAR PLANE SYSTOLIC EXCURSION: 2.12 CM

## 2021-04-29 PROCEDURE — 78452 STRESS TEST WITH MYOCARDIAL PERFUSION (CUPID ONLY): ICD-10-PCS | Mod: 26,,, | Performed by: INTERNAL MEDICINE

## 2021-04-29 PROCEDURE — 93018 PR CARDIAC STRESS TST,INTERP/REPT ONLY: ICD-10-PCS | Mod: ,,, | Performed by: INTERNAL MEDICINE

## 2021-04-29 PROCEDURE — 78452 HT MUSCLE IMAGE SPECT MULT: CPT | Mod: PO

## 2021-04-29 PROCEDURE — 99999 PR PBB SHADOW E&M-EST. PATIENT-LVL I: CPT | Mod: PBBFAC,,,

## 2021-04-29 PROCEDURE — 99999 PR PBB SHADOW E&M-EST. PATIENT-LVL I: ICD-10-PCS | Mod: PBBFAC,,,

## 2021-04-29 PROCEDURE — 99211 OFF/OP EST MAY X REQ PHY/QHP: CPT | Mod: PBBFAC,25,PO

## 2021-04-29 PROCEDURE — 99211 OFF/OP EST MAY X REQ PHY/QHP: CPT | Mod: PBBFAC,25,27,PO

## 2021-04-29 PROCEDURE — 93018 CV STRESS TEST I&R ONLY: CPT | Mod: ,,, | Performed by: INTERNAL MEDICINE

## 2021-04-29 PROCEDURE — 93016 CV STRESS TEST SUPVJ ONLY: CPT | Mod: ,,, | Performed by: INTERNAL MEDICINE

## 2021-04-29 PROCEDURE — 78452 HT MUSCLE IMAGE SPECT MULT: CPT | Mod: 26,,, | Performed by: INTERNAL MEDICINE

## 2021-04-29 PROCEDURE — A9502 TC99M TETROFOSMIN: HCPCS | Mod: PO

## 2021-04-29 PROCEDURE — 93016 STRESS TEST WITH MYOCARDIAL PERFUSION (CUPID ONLY): ICD-10-PCS | Mod: ,,, | Performed by: INTERNAL MEDICINE

## 2021-04-29 PROCEDURE — 93017 CV STRESS TEST TRACING ONLY: CPT | Mod: PO

## 2021-04-29 RX ORDER — REGADENOSON 0.08 MG/ML
0.4 INJECTION, SOLUTION INTRAVENOUS ONCE
Status: COMPLETED | OUTPATIENT
Start: 2021-04-29 | End: 2021-04-29

## 2021-04-29 RX ADMIN — REGADENOSON 0.4 MG: 0.08 INJECTION, SOLUTION INTRAVENOUS at 09:04

## 2021-05-03 ENCOUNTER — TELEPHONE (OUTPATIENT)
Dept: CARDIOLOGY | Facility: CLINIC | Age: 71
End: 2021-05-03

## 2021-05-04 ENCOUNTER — OFFICE VISIT (OUTPATIENT)
Dept: FAMILY MEDICINE | Facility: CLINIC | Age: 71
End: 2021-05-04
Payer: MEDICARE

## 2021-05-04 VITALS
SYSTOLIC BLOOD PRESSURE: 128 MMHG | HEIGHT: 70 IN | HEART RATE: 80 BPM | TEMPERATURE: 98 F | DIASTOLIC BLOOD PRESSURE: 76 MMHG | BODY MASS INDEX: 31.82 KG/M2 | RESPIRATION RATE: 14 BRPM | OXYGEN SATURATION: 96 % | WEIGHT: 222.25 LBS

## 2021-05-04 DIAGNOSIS — K42.9 UMBILICAL HERNIA WITHOUT OBSTRUCTION AND WITHOUT GANGRENE: ICD-10-CM

## 2021-05-04 DIAGNOSIS — J30.9 CHRONIC ALLERGIC RHINITIS: ICD-10-CM

## 2021-05-04 DIAGNOSIS — D69.6 THROMBOCYTOPENIA: ICD-10-CM

## 2021-05-04 DIAGNOSIS — Z12.5 PROSTATE CANCER SCREENING: ICD-10-CM

## 2021-05-04 DIAGNOSIS — I70.0 THORACIC AORTA ATHEROSCLEROSIS: ICD-10-CM

## 2021-05-04 DIAGNOSIS — R05.3 CHRONIC COUGH: Primary | ICD-10-CM

## 2021-05-04 DIAGNOSIS — E78.2 MIXED HYPERLIPIDEMIA: ICD-10-CM

## 2021-05-04 PROCEDURE — 36415 PR COLLECTION VENOUS BLOOD,VENIPUNCTURE: ICD-10-PCS | Mod: S$GLB,,, | Performed by: NURSE PRACTITIONER

## 2021-05-04 PROCEDURE — 80061 LIPID PANEL: CPT | Performed by: NURSE PRACTITIONER

## 2021-05-04 PROCEDURE — 99214 PR OFFICE/OUTPT VISIT, EST, LEVL IV, 30-39 MIN: ICD-10-PCS | Mod: S$GLB,,, | Performed by: NURSE PRACTITIONER

## 2021-05-04 PROCEDURE — 36415 COLL VENOUS BLD VENIPUNCTURE: CPT | Mod: S$GLB,,, | Performed by: NURSE PRACTITIONER

## 2021-05-04 PROCEDURE — 84153 ASSAY OF PSA TOTAL: CPT | Performed by: NURSE PRACTITIONER

## 2021-05-04 PROCEDURE — 80053 COMPREHEN METABOLIC PANEL: CPT | Performed by: NURSE PRACTITIONER

## 2021-05-04 PROCEDURE — 85025 COMPLETE CBC W/AUTO DIFF WBC: CPT | Performed by: NURSE PRACTITIONER

## 2021-05-04 PROCEDURE — 99214 OFFICE O/P EST MOD 30 MIN: CPT | Mod: S$GLB,,, | Performed by: NURSE PRACTITIONER

## 2021-05-05 LAB
ALBUMIN SERPL BCP-MCNC: 3.8 G/DL (ref 3.5–5.2)
ALP SERPL-CCNC: 59 U/L (ref 55–135)
ALT SERPL W/O P-5'-P-CCNC: 10 U/L (ref 10–44)
ANION GAP SERPL CALC-SCNC: 7 MMOL/L (ref 8–16)
AST SERPL-CCNC: 25 U/L (ref 10–40)
BASOPHILS # BLD AUTO: 0.09 K/UL (ref 0–0.2)
BASOPHILS NFR BLD: 1 % (ref 0–1.9)
BILIRUB SERPL-MCNC: 1.2 MG/DL (ref 0.1–1)
BUN SERPL-MCNC: 15 MG/DL (ref 8–23)
CALCIUM SERPL-MCNC: 9.2 MG/DL (ref 8.7–10.5)
CHLORIDE SERPL-SCNC: 104 MMOL/L (ref 95–110)
CHOLEST SERPL-MCNC: 141 MG/DL (ref 120–199)
CHOLEST/HDLC SERPL: 3.6 {RATIO} (ref 2–5)
CO2 SERPL-SCNC: 29 MMOL/L (ref 23–29)
COMPLEXED PSA SERPL-MCNC: 0.83 NG/ML (ref 0–4)
CREAT SERPL-MCNC: 0.9 MG/DL (ref 0.5–1.4)
DIFFERENTIAL METHOD: ABNORMAL
EOSINOPHIL # BLD AUTO: 0.4 K/UL (ref 0–0.5)
EOSINOPHIL NFR BLD: 4.9 % (ref 0–8)
ERYTHROCYTE [DISTWIDTH] IN BLOOD BY AUTOMATED COUNT: 13.8 % (ref 11.5–14.5)
EST. GFR  (AFRICAN AMERICAN): >60 ML/MIN/1.73 M^2
EST. GFR  (NON AFRICAN AMERICAN): >60 ML/MIN/1.73 M^2
GLUCOSE SERPL-MCNC: 122 MG/DL (ref 70–110)
HCT VFR BLD AUTO: 40.4 % (ref 40–54)
HDLC SERPL-MCNC: 39 MG/DL (ref 40–75)
HDLC SERPL: 27.7 % (ref 20–50)
HGB BLD-MCNC: 13 G/DL (ref 14–18)
IMM GRANULOCYTES # BLD AUTO: 0.02 K/UL (ref 0–0.04)
IMM GRANULOCYTES NFR BLD AUTO: 0.2 % (ref 0–0.5)
LDLC SERPL CALC-MCNC: 75.2 MG/DL (ref 63–159)
LYMPHOCYTES # BLD AUTO: 1.4 K/UL (ref 1–4.8)
LYMPHOCYTES NFR BLD: 16.6 % (ref 18–48)
MCH RBC QN AUTO: 27.5 PG (ref 27–31)
MCHC RBC AUTO-ENTMCNC: 32.2 G/DL (ref 32–36)
MCV RBC AUTO: 86 FL (ref 82–98)
MONOCYTES # BLD AUTO: 0.5 K/UL (ref 0.3–1)
MONOCYTES NFR BLD: 6 % (ref 4–15)
NEUTROPHILS # BLD AUTO: 6.1 K/UL (ref 1.8–7.7)
NEUTROPHILS NFR BLD: 71.3 % (ref 38–73)
NONHDLC SERPL-MCNC: 102 MG/DL
NRBC BLD-RTO: 0 /100 WBC
PLATELET # BLD AUTO: 161 K/UL (ref 150–450)
PMV BLD AUTO: 14 FL (ref 9.2–12.9)
POTASSIUM SERPL-SCNC: 4.6 MMOL/L (ref 3.5–5.1)
PROT SERPL-MCNC: 6.9 G/DL (ref 6–8.4)
RBC # BLD AUTO: 4.72 M/UL (ref 4.6–6.2)
SODIUM SERPL-SCNC: 140 MMOL/L (ref 136–145)
TRIGL SERPL-MCNC: 134 MG/DL (ref 30–150)
WBC # BLD AUTO: 8.6 K/UL (ref 3.9–12.7)

## 2021-05-06 ENCOUNTER — OFFICE VISIT (OUTPATIENT)
Dept: PSYCHIATRY | Facility: CLINIC | Age: 71
End: 2021-05-06
Payer: MEDICARE

## 2021-05-06 ENCOUNTER — PATIENT MESSAGE (OUTPATIENT)
Dept: FAMILY MEDICINE | Facility: CLINIC | Age: 71
End: 2021-05-06

## 2021-05-06 DIAGNOSIS — F33.1 MODERATE EPISODE OF RECURRENT MAJOR DEPRESSIVE DISORDER: Primary | ICD-10-CM

## 2021-05-06 DIAGNOSIS — F41.9 ANXIETY: ICD-10-CM

## 2021-05-06 DIAGNOSIS — R73.09 ELEVATED GLUCOSE: Primary | ICD-10-CM

## 2021-05-06 PROCEDURE — 99212 OFFICE O/P EST SF 10 MIN: CPT | Mod: PBBFAC,PO | Performed by: SOCIAL WORKER

## 2021-05-06 PROCEDURE — 90834 PR PSYCHOTHERAPY W/PATIENT, 45 MIN: ICD-10-PCS | Mod: ,,, | Performed by: SOCIAL WORKER

## 2021-05-06 PROCEDURE — 90834 PSYTX W PT 45 MINUTES: CPT | Mod: ,,, | Performed by: SOCIAL WORKER

## 2021-05-06 PROCEDURE — 99999 PR PBB SHADOW E&M-EST. PATIENT-LVL II: ICD-10-PCS | Mod: PBBFAC,,, | Performed by: SOCIAL WORKER

## 2021-05-06 PROCEDURE — 99999 PR PBB SHADOW E&M-EST. PATIENT-LVL II: CPT | Mod: PBBFAC,,, | Performed by: SOCIAL WORKER

## 2021-05-11 ENCOUNTER — LAB VISIT (OUTPATIENT)
Dept: LAB | Facility: HOSPITAL | Age: 71
End: 2021-05-11
Attending: PEDIATRICS
Payer: MEDICARE

## 2021-05-11 DIAGNOSIS — R73.09 ELEVATED GLUCOSE: ICD-10-CM

## 2021-05-11 PROCEDURE — 36415 COLL VENOUS BLD VENIPUNCTURE: CPT | Mod: PO | Performed by: NURSE PRACTITIONER

## 2021-05-11 PROCEDURE — 83036 HEMOGLOBIN GLYCOSYLATED A1C: CPT | Performed by: NURSE PRACTITIONER

## 2021-05-12 ENCOUNTER — PATIENT MESSAGE (OUTPATIENT)
Dept: FAMILY MEDICINE | Facility: CLINIC | Age: 71
End: 2021-05-12

## 2021-05-12 LAB
ESTIMATED AVG GLUCOSE: 131 MG/DL (ref 68–131)
HBA1C MFR BLD: 6.2 % (ref 4–5.6)

## 2021-05-13 ENCOUNTER — OFFICE VISIT (OUTPATIENT)
Dept: SURGERY | Facility: CLINIC | Age: 71
End: 2021-05-13
Payer: MEDICARE

## 2021-05-13 VITALS
SYSTOLIC BLOOD PRESSURE: 154 MMHG | TEMPERATURE: 98 F | HEART RATE: 77 BPM | WEIGHT: 223.13 LBS | HEIGHT: 70 IN | BODY MASS INDEX: 31.94 KG/M2 | DIASTOLIC BLOOD PRESSURE: 84 MMHG

## 2021-05-13 DIAGNOSIS — K42.9 UMBILICAL HERNIA WITHOUT OBSTRUCTION AND WITHOUT GANGRENE: Primary | ICD-10-CM

## 2021-05-13 DIAGNOSIS — Z01.818 PRE-OP TESTING: ICD-10-CM

## 2021-05-13 PROCEDURE — 99204 OFFICE O/P NEW MOD 45 MIN: CPT | Mod: S$PBB,,, | Performed by: SURGERY

## 2021-05-13 PROCEDURE — 99204 PR OFFICE/OUTPT VISIT, NEW, LEVL IV, 45-59 MIN: ICD-10-PCS | Mod: S$PBB,,, | Performed by: SURGERY

## 2021-05-13 PROCEDURE — 99999 PR PBB SHADOW E&M-EST. PATIENT-LVL IV: ICD-10-PCS | Mod: PBBFAC,,, | Performed by: SURGERY

## 2021-05-13 PROCEDURE — 99999 PR PBB SHADOW E&M-EST. PATIENT-LVL IV: CPT | Mod: PBBFAC,,, | Performed by: SURGERY

## 2021-05-13 PROCEDURE — 99214 OFFICE O/P EST MOD 30 MIN: CPT | Mod: PBBFAC,PO | Performed by: SURGERY

## 2021-05-13 RX ORDER — ESOMEPRAZOLE MAGNESIUM 40 MG/1
1 CAPSULE, DELAYED RELEASE ORAL DAILY
Status: ON HOLD | COMMUNITY
End: 2023-01-01 | Stop reason: HOSPADM

## 2021-05-14 RX ORDER — SODIUM CHLORIDE 9 MG/ML
INJECTION, SOLUTION INTRAVENOUS CONTINUOUS
Status: CANCELLED | OUTPATIENT
Start: 2021-05-14

## 2021-05-17 ENCOUNTER — LAB VISIT (OUTPATIENT)
Dept: FAMILY MEDICINE | Facility: CLINIC | Age: 71
End: 2021-05-17
Payer: MEDICARE

## 2021-05-17 DIAGNOSIS — Z01.818 PRE-OP TESTING: ICD-10-CM

## 2021-05-17 PROCEDURE — U0005 INFEC AGEN DETEC AMPLI PROBE: HCPCS | Performed by: SURGERY

## 2021-05-17 PROCEDURE — U0003 INFECTIOUS AGENT DETECTION BY NUCLEIC ACID (DNA OR RNA); SEVERE ACUTE RESPIRATORY SYNDROME CORONAVIRUS 2 (SARS-COV-2) (CORONAVIRUS DISEASE [COVID-19]), AMPLIFIED PROBE TECHNIQUE, MAKING USE OF HIGH THROUGHPUT TECHNOLOGIES AS DESCRIBED BY CMS-2020-01-R: HCPCS | Performed by: SURGERY

## 2021-05-18 ENCOUNTER — ANESTHESIA EVENT (OUTPATIENT)
Dept: SURGERY | Facility: HOSPITAL | Age: 71
End: 2021-05-18
Payer: MEDICARE

## 2021-05-18 ENCOUNTER — TELEPHONE (OUTPATIENT)
Dept: SURGERY | Facility: CLINIC | Age: 71
End: 2021-05-18

## 2021-05-18 LAB — SARS-COV-2 RNA RESP QL NAA+PROBE: NOT DETECTED

## 2021-05-19 ENCOUNTER — HOSPITAL ENCOUNTER (OUTPATIENT)
Facility: HOSPITAL | Age: 71
Discharge: HOME | End: 2021-05-19
Attending: SURGERY | Admitting: SURGERY
Payer: MEDICARE

## 2021-05-19 ENCOUNTER — ANESTHESIA (OUTPATIENT)
Dept: SURGERY | Facility: HOSPITAL | Age: 71
End: 2021-05-19
Payer: MEDICARE

## 2021-05-19 DIAGNOSIS — K42.9 UMBILICAL HERNIA WITHOUT OBSTRUCTION AND WITHOUT GANGRENE: ICD-10-CM

## 2021-05-19 PROCEDURE — 71000015 HC POSTOP RECOV 1ST HR: Mod: PO | Performed by: SURGERY

## 2021-05-19 PROCEDURE — C1781 MESH (IMPLANTABLE): HCPCS | Mod: PO | Performed by: SURGERY

## 2021-05-19 PROCEDURE — 37000008 HC ANESTHESIA 1ST 15 MINUTES: Mod: PO | Performed by: SURGERY

## 2021-05-19 PROCEDURE — 63600175 PHARM REV CODE 636 W HCPCS: Mod: PO | Performed by: ANESTHESIOLOGY

## 2021-05-19 PROCEDURE — 49585 PR REPAIR UMBILICAL HERN,5+Y/O,REDUC: CPT | Mod: ,,, | Performed by: SURGERY

## 2021-05-19 PROCEDURE — 36000706: Mod: PO | Performed by: SURGERY

## 2021-05-19 PROCEDURE — 36000707: Mod: PO | Performed by: SURGERY

## 2021-05-19 PROCEDURE — 37000009 HC ANESTHESIA EA ADD 15 MINS: Mod: PO | Performed by: SURGERY

## 2021-05-19 PROCEDURE — 25000003 PHARM REV CODE 250: Mod: PO | Performed by: ANESTHESIOLOGY

## 2021-05-19 PROCEDURE — D9220A PRA ANESTHESIA: Mod: CRNA,,, | Performed by: NURSE ANESTHETIST, CERTIFIED REGISTERED

## 2021-05-19 PROCEDURE — 71000033 HC RECOVERY, INTIAL HOUR: Mod: PO | Performed by: SURGERY

## 2021-05-19 PROCEDURE — 63600175 PHARM REV CODE 636 W HCPCS: Mod: PO | Performed by: NURSE ANESTHETIST, CERTIFIED REGISTERED

## 2021-05-19 PROCEDURE — D9220A PRA ANESTHESIA: ICD-10-PCS | Mod: ANES,,, | Performed by: ANESTHESIOLOGY

## 2021-05-19 PROCEDURE — 25000003 PHARM REV CODE 250: Mod: PO | Performed by: SURGERY

## 2021-05-19 PROCEDURE — D9220A PRA ANESTHESIA: Mod: ANES,,, | Performed by: ANESTHESIOLOGY

## 2021-05-19 PROCEDURE — 49585 PR REPAIR UMBILICAL HERN,5+Y/O,REDUC: ICD-10-PCS | Mod: ,,, | Performed by: SURGERY

## 2021-05-19 PROCEDURE — 63600175 PHARM REV CODE 636 W HCPCS: Mod: PO | Performed by: SURGERY

## 2021-05-19 PROCEDURE — 25000003 PHARM REV CODE 250: Mod: PO | Performed by: NURSE ANESTHETIST, CERTIFIED REGISTERED

## 2021-05-19 PROCEDURE — D9220A PRA ANESTHESIA: ICD-10-PCS | Mod: CRNA,,, | Performed by: NURSE ANESTHETIST, CERTIFIED REGISTERED

## 2021-05-19 DEVICE — PATCH HERNIA MESH VENTRALEX: Type: IMPLANTABLE DEVICE | Site: ABDOMEN | Status: FUNCTIONAL

## 2021-05-19 RX ORDER — KETOROLAC TROMETHAMINE 30 MG/ML
INJECTION, SOLUTION INTRAMUSCULAR; INTRAVENOUS
Status: DISCONTINUED | OUTPATIENT
Start: 2021-05-19 | End: 2021-05-19

## 2021-05-19 RX ORDER — HYDROCODONE BITARTRATE AND ACETAMINOPHEN 5; 325 MG/1; MG/1
1 TABLET ORAL EVERY 6 HOURS PRN
Qty: 20 TABLET | Refills: 0 | Status: SHIPPED | OUTPATIENT
Start: 2021-05-19 | End: 2021-08-25

## 2021-05-19 RX ORDER — ONDANSETRON 2 MG/ML
4 INJECTION INTRAMUSCULAR; INTRAVENOUS EVERY 12 HOURS PRN
Status: DISCONTINUED | OUTPATIENT
Start: 2021-05-19 | End: 2021-05-19 | Stop reason: HOSPADM

## 2021-05-19 RX ORDER — CEFAZOLIN SODIUM 2 G/50ML
2 SOLUTION INTRAVENOUS
Status: DISCONTINUED | OUTPATIENT
Start: 2021-05-19 | End: 2021-05-19 | Stop reason: HOSPADM

## 2021-05-19 RX ORDER — SODIUM CHLORIDE 9 MG/ML
INJECTION, SOLUTION INTRAVENOUS CONTINUOUS
Status: DISCONTINUED | OUTPATIENT
Start: 2021-05-19 | End: 2021-05-19 | Stop reason: HOSPADM

## 2021-05-19 RX ORDER — ONDANSETRON 2 MG/ML
INJECTION INTRAMUSCULAR; INTRAVENOUS
Status: DISCONTINUED | OUTPATIENT
Start: 2021-05-19 | End: 2021-05-19

## 2021-05-19 RX ORDER — PHENYLEPHRINE HYDROCHLORIDE 10 MG/ML
INJECTION INTRAVENOUS
Status: DISCONTINUED | OUTPATIENT
Start: 2021-05-19 | End: 2021-05-19

## 2021-05-19 RX ORDER — LIDOCAINE HYDROCHLORIDE 10 MG/ML
1 INJECTION, SOLUTION EPIDURAL; INFILTRATION; INTRACAUDAL; PERINEURAL ONCE
Status: DISCONTINUED | OUTPATIENT
Start: 2021-05-19 | End: 2021-05-19 | Stop reason: HOSPADM

## 2021-05-19 RX ORDER — SODIUM CHLORIDE 0.9 % (FLUSH) 0.9 %
3 SYRINGE (ML) INJECTION
Status: DISCONTINUED | OUTPATIENT
Start: 2021-05-19 | End: 2021-05-19 | Stop reason: HOSPADM

## 2021-05-19 RX ORDER — BUPIVACAINE HCL/EPINEPHRINE 0.25-.0005
VIAL (ML) INJECTION
Status: DISCONTINUED | OUTPATIENT
Start: 2021-05-19 | End: 2021-05-19 | Stop reason: HOSPADM

## 2021-05-19 RX ORDER — LIDOCAINE HCL/PF 100 MG/5ML
SYRINGE (ML) INTRAVENOUS
Status: DISCONTINUED | OUTPATIENT
Start: 2021-05-19 | End: 2021-05-19

## 2021-05-19 RX ORDER — SODIUM CHLORIDE, SODIUM LACTATE, POTASSIUM CHLORIDE, CALCIUM CHLORIDE 600; 310; 30; 20 MG/100ML; MG/100ML; MG/100ML; MG/100ML
INJECTION, SOLUTION INTRAVENOUS CONTINUOUS
Status: DISCONTINUED | OUTPATIENT
Start: 2021-05-19 | End: 2021-05-19 | Stop reason: HOSPADM

## 2021-05-19 RX ORDER — FENTANYL CITRATE 50 UG/ML
25 INJECTION, SOLUTION INTRAMUSCULAR; INTRAVENOUS EVERY 5 MIN PRN
Status: DISCONTINUED | OUTPATIENT
Start: 2021-05-19 | End: 2021-05-19 | Stop reason: HOSPADM

## 2021-05-19 RX ORDER — KETAMINE HCL IN 0.9 % NACL 50 MG/5 ML
SYRINGE (ML) INTRAVENOUS
Status: DISCONTINUED | OUTPATIENT
Start: 2021-05-19 | End: 2021-05-19

## 2021-05-19 RX ORDER — FENTANYL CITRATE 50 UG/ML
INJECTION, SOLUTION INTRAMUSCULAR; INTRAVENOUS
Status: DISCONTINUED | OUTPATIENT
Start: 2021-05-19 | End: 2021-05-19

## 2021-05-19 RX ORDER — DEXAMETHASONE SODIUM PHOSPHATE 4 MG/ML
INJECTION, SOLUTION INTRA-ARTICULAR; INTRALESIONAL; INTRAMUSCULAR; INTRAVENOUS; SOFT TISSUE
Status: DISCONTINUED | OUTPATIENT
Start: 2021-05-19 | End: 2021-05-19

## 2021-05-19 RX ORDER — PROPOFOL 10 MG/ML
VIAL (ML) INTRAVENOUS
Status: DISCONTINUED | OUTPATIENT
Start: 2021-05-19 | End: 2021-05-19

## 2021-05-19 RX ORDER — ROCURONIUM BROMIDE 10 MG/ML
INJECTION, SOLUTION INTRAVENOUS
Status: DISCONTINUED | OUTPATIENT
Start: 2021-05-19 | End: 2021-05-19

## 2021-05-19 RX ORDER — OXYCODONE HYDROCHLORIDE 5 MG/1
5 TABLET ORAL ONCE AS NEEDED
Status: COMPLETED | OUTPATIENT
Start: 2021-05-19 | End: 2021-05-19

## 2021-05-19 RX ORDER — MIDAZOLAM HYDROCHLORIDE 1 MG/ML
INJECTION INTRAMUSCULAR; INTRAVENOUS
Status: DISCONTINUED | OUTPATIENT
Start: 2021-05-19 | End: 2021-05-19

## 2021-05-19 RX ORDER — ACETAMINOPHEN 10 MG/ML
INJECTION, SOLUTION INTRAVENOUS
Status: DISCONTINUED | OUTPATIENT
Start: 2021-05-19 | End: 2021-05-19

## 2021-05-19 RX ADMIN — ROCURONIUM BROMIDE 40 MG: 10 INJECTION, SOLUTION INTRAVENOUS at 08:05

## 2021-05-19 RX ADMIN — CEFAZOLIN SODIUM 3 G: 2 SOLUTION INTRAVENOUS at 08:05

## 2021-05-19 RX ADMIN — ACETAMINOPHEN 1000 MG: 10 INJECTION, SOLUTION INTRAVENOUS at 08:05

## 2021-05-19 RX ADMIN — MIDAZOLAM HYDROCHLORIDE 2 MG: 1 INJECTION, SOLUTION INTRAMUSCULAR; INTRAVENOUS at 08:05

## 2021-05-19 RX ADMIN — OXYCODONE 5 MG: 5 TABLET ORAL at 09:05

## 2021-05-19 RX ADMIN — PHENYLEPHRINE HYDROCHLORIDE 50 MCG: 10 INJECTION, SOLUTION INTRAMUSCULAR; INTRAVENOUS; SUBCUTANEOUS at 08:05

## 2021-05-19 RX ADMIN — FENTANYL CITRATE 100 MCG: 50 INJECTION, SOLUTION INTRAMUSCULAR; INTRAVENOUS at 08:05

## 2021-05-19 RX ADMIN — SODIUM CHLORIDE, SODIUM LACTATE, POTASSIUM CHLORIDE, AND CALCIUM CHLORIDE: .6; .31; .03; .02 INJECTION, SOLUTION INTRAVENOUS at 07:05

## 2021-05-19 RX ADMIN — PROPOFOL 50 MG: 10 INJECTION, EMULSION INTRAVENOUS at 08:05

## 2021-05-19 RX ADMIN — SUGAMMADEX 200 MG: 100 INJECTION, SOLUTION INTRAVENOUS at 08:05

## 2021-05-19 RX ADMIN — ROCURONIUM BROMIDE 5 MG: 10 INJECTION, SOLUTION INTRAVENOUS at 08:05

## 2021-05-19 RX ADMIN — DEXAMETHASONE SODIUM PHOSPHATE 8 MG: 4 INJECTION, SOLUTION INTRAMUSCULAR; INTRAVENOUS at 08:05

## 2021-05-19 RX ADMIN — PROPOFOL 250 MG: 10 INJECTION, EMULSION INTRAVENOUS at 08:05

## 2021-05-19 RX ADMIN — SODIUM CHLORIDE, SODIUM LACTATE, POTASSIUM CHLORIDE, AND CALCIUM CHLORIDE: .6; .31; .03; .02 INJECTION, SOLUTION INTRAVENOUS at 08:05

## 2021-05-19 RX ADMIN — KETOROLAC TROMETHAMINE 30 MG: 30 INJECTION, SOLUTION INTRAMUSCULAR; INTRAVENOUS at 08:05

## 2021-05-19 RX ADMIN — ONDANSETRON 4 MG: 2 INJECTION, SOLUTION INTRAMUSCULAR; INTRAVENOUS at 08:05

## 2021-05-19 RX ADMIN — Medication 25 MG: at 08:05

## 2021-05-19 RX ADMIN — FENTANYL CITRATE 25 MCG: 50 INJECTION, SOLUTION INTRAMUSCULAR; INTRAVENOUS at 09:05

## 2021-05-19 RX ADMIN — LIDOCAINE HYDROCHLORIDE 100 MG: 20 INJECTION PARENTERAL at 08:05

## 2021-05-20 VITALS
HEIGHT: 72 IN | SYSTOLIC BLOOD PRESSURE: 148 MMHG | OXYGEN SATURATION: 97 % | WEIGHT: 223 LBS | RESPIRATION RATE: 16 BRPM | TEMPERATURE: 98 F | BODY MASS INDEX: 30.2 KG/M2 | DIASTOLIC BLOOD PRESSURE: 85 MMHG | HEART RATE: 70 BPM

## 2021-06-01 ENCOUNTER — OFFICE VISIT (OUTPATIENT)
Dept: PSYCHIATRY | Facility: CLINIC | Age: 71
End: 2021-06-01
Payer: MEDICARE

## 2021-06-01 ENCOUNTER — TELEPHONE (OUTPATIENT)
Dept: SURGERY | Facility: CLINIC | Age: 71
End: 2021-06-01

## 2021-06-01 DIAGNOSIS — F41.9 ANXIETY: Primary | ICD-10-CM

## 2021-06-01 PROCEDURE — 90834 PSYTX W PT 45 MINUTES: CPT | Mod: ,,, | Performed by: SOCIAL WORKER

## 2021-06-01 PROCEDURE — 90785 PR INTERACTIVE COMPLEXITY: ICD-10-PCS | Mod: ,,, | Performed by: SOCIAL WORKER

## 2021-06-01 PROCEDURE — 90834 PR PSYCHOTHERAPY W/PATIENT, 45 MIN: ICD-10-PCS | Mod: ,,, | Performed by: SOCIAL WORKER

## 2021-06-01 PROCEDURE — 90785 PSYTX COMPLEX INTERACTIVE: CPT | Mod: ,,, | Performed by: SOCIAL WORKER

## 2021-06-02 ENCOUNTER — OFFICE VISIT (OUTPATIENT)
Dept: SURGERY | Facility: CLINIC | Age: 71
End: 2021-06-02
Payer: MEDICARE

## 2021-06-02 VITALS — HEIGHT: 72 IN | TEMPERATURE: 98 F | BODY MASS INDEX: 29.92 KG/M2 | WEIGHT: 220.88 LBS

## 2021-06-02 DIAGNOSIS — Z09 POSTOP CHECK: Primary | ICD-10-CM

## 2021-06-02 PROCEDURE — 99999 PR PBB SHADOW E&M-EST. PATIENT-LVL III: ICD-10-PCS | Mod: PBBFAC,,, | Performed by: SURGERY

## 2021-06-02 PROCEDURE — 99999 PR PBB SHADOW E&M-EST. PATIENT-LVL III: CPT | Mod: PBBFAC,,, | Performed by: SURGERY

## 2021-06-02 PROCEDURE — 99213 OFFICE O/P EST LOW 20 MIN: CPT | Mod: PBBFAC,PO | Performed by: SURGERY

## 2021-06-02 PROCEDURE — 99024 PR POST-OP FOLLOW-UP VISIT: ICD-10-PCS | Mod: POP,,, | Performed by: SURGERY

## 2021-06-02 PROCEDURE — 99024 POSTOP FOLLOW-UP VISIT: CPT | Mod: POP,,, | Performed by: SURGERY

## 2021-07-13 ENCOUNTER — TELEPHONE (OUTPATIENT)
Dept: FAMILY MEDICINE | Facility: CLINIC | Age: 71
End: 2021-07-13

## 2021-07-13 DIAGNOSIS — J30.9 CHRONIC ALLERGIC RHINITIS: Primary | ICD-10-CM

## 2021-08-23 ENCOUNTER — TELEPHONE (OUTPATIENT)
Dept: PSYCHIATRY | Facility: CLINIC | Age: 71
End: 2021-08-23

## 2021-08-24 ENCOUNTER — OFFICE VISIT (OUTPATIENT)
Dept: PSYCHIATRY | Facility: CLINIC | Age: 71
End: 2021-08-24
Payer: MEDICARE

## 2021-08-24 ENCOUNTER — TELEPHONE (OUTPATIENT)
Dept: FAMILY MEDICINE | Facility: CLINIC | Age: 71
End: 2021-08-24

## 2021-08-24 DIAGNOSIS — F33.1 MODERATE EPISODE OF RECURRENT MAJOR DEPRESSIVE DISORDER: Primary | ICD-10-CM

## 2021-08-24 PROCEDURE — 99214 PR OFFICE/OUTPT VISIT, EST, LEVL IV, 30-39 MIN: ICD-10-PCS | Mod: 95,,, | Performed by: PSYCHOLOGIST

## 2021-08-24 PROCEDURE — 99214 OFFICE O/P EST MOD 30 MIN: CPT | Mod: 95,,, | Performed by: PSYCHOLOGIST

## 2021-08-24 RX ORDER — VORTIOXETINE 10 MG/1
10 TABLET, FILM COATED ORAL DAILY
Qty: 90 TABLET | Refills: 3 | Status: SHIPPED | OUTPATIENT
Start: 2021-08-24 | End: 2022-10-03 | Stop reason: SDUPTHER

## 2021-08-25 ENCOUNTER — OFFICE VISIT (OUTPATIENT)
Dept: FAMILY MEDICINE | Facility: CLINIC | Age: 71
End: 2021-08-25
Payer: MEDICARE

## 2021-08-25 VITALS
WEIGHT: 211.75 LBS | SYSTOLIC BLOOD PRESSURE: 128 MMHG | TEMPERATURE: 98 F | BODY MASS INDEX: 28.68 KG/M2 | HEIGHT: 72 IN | RESPIRATION RATE: 20 BRPM | OXYGEN SATURATION: 95 % | DIASTOLIC BLOOD PRESSURE: 72 MMHG | HEART RATE: 88 BPM

## 2021-08-25 DIAGNOSIS — S60.459A: Primary | ICD-10-CM

## 2021-08-25 PROCEDURE — 99214 OFFICE O/P EST MOD 30 MIN: CPT | Mod: S$GLB,,, | Performed by: NURSE PRACTITIONER

## 2021-08-25 PROCEDURE — 99214 PR OFFICE/OUTPT VISIT, EST, LEVL IV, 30-39 MIN: ICD-10-PCS | Mod: S$GLB,,, | Performed by: NURSE PRACTITIONER

## 2021-08-25 RX ORDER — MONTELUKAST SODIUM 10 MG/1
TABLET ORAL
COMMUNITY
Start: 2021-07-23 | End: 2022-05-03

## 2021-09-02 ENCOUNTER — PATIENT MESSAGE (OUTPATIENT)
Dept: ALLERGY | Facility: CLINIC | Age: 71
End: 2021-09-02

## 2021-09-20 ENCOUNTER — OFFICE VISIT (OUTPATIENT)
Dept: ALLERGY | Facility: CLINIC | Age: 71
End: 2021-09-20
Payer: MEDICARE

## 2021-09-20 VITALS — HEIGHT: 72 IN | OXYGEN SATURATION: 97 % | BODY MASS INDEX: 28.84 KG/M2 | WEIGHT: 212.94 LBS | HEART RATE: 98 BPM

## 2021-09-20 DIAGNOSIS — J30.9 CHRONIC ALLERGIC RHINITIS: ICD-10-CM

## 2021-09-20 DIAGNOSIS — I10 ESSENTIAL HYPERTENSION: ICD-10-CM

## 2021-09-20 DIAGNOSIS — R06.02 SHORTNESS OF BREATH: ICD-10-CM

## 2021-09-20 DIAGNOSIS — K21.9 GERD WITHOUT ESOPHAGITIS: ICD-10-CM

## 2021-09-20 DIAGNOSIS — R05.3 CHRONIC COUGH: Primary | ICD-10-CM

## 2021-09-20 PROCEDURE — 95165 ANTIGEN THERAPY SERVICES: CPT | Mod: PBBFAC,PO | Performed by: ALLERGY & IMMUNOLOGY

## 2021-09-20 PROCEDURE — 99999 PR PBB SHADOW E&M-EST. PATIENT-LVL III: ICD-10-PCS | Mod: PBBFAC,,, | Performed by: ALLERGY & IMMUNOLOGY

## 2021-09-20 PROCEDURE — 99214 PR OFFICE/OUTPT VISIT, EST, LEVL IV, 30-39 MIN: ICD-10-PCS | Mod: S$PBB,,, | Performed by: ALLERGY & IMMUNOLOGY

## 2021-09-20 PROCEDURE — 99999 PR PBB SHADOW E&M-EST. PATIENT-LVL III: CPT | Mod: PBBFAC,,, | Performed by: ALLERGY & IMMUNOLOGY

## 2021-09-20 PROCEDURE — 99214 OFFICE O/P EST MOD 30 MIN: CPT | Mod: S$PBB,,, | Performed by: ALLERGY & IMMUNOLOGY

## 2021-09-20 PROCEDURE — 99213 OFFICE O/P EST LOW 20 MIN: CPT | Mod: PBBFAC,PO | Performed by: ALLERGY & IMMUNOLOGY

## 2021-09-24 ENCOUNTER — PATIENT MESSAGE (OUTPATIENT)
Dept: ALLERGY | Facility: CLINIC | Age: 71
End: 2021-09-24

## 2021-09-29 ENCOUNTER — PATIENT MESSAGE (OUTPATIENT)
Dept: ALLERGY | Facility: CLINIC | Age: 71
End: 2021-09-29

## 2021-10-02 PROCEDURE — 95165 ANTIGEN THERAPY SERVICES: CPT | Mod: S$PBB,,, | Performed by: ALLERGY & IMMUNOLOGY

## 2021-10-02 PROCEDURE — 95165 PR PROFES SVC,IMMUNOTHER,SINGLE/MULT AGS: ICD-10-PCS | Mod: S$PBB,,, | Performed by: ALLERGY & IMMUNOLOGY

## 2021-10-15 ENCOUNTER — OFFICE VISIT (OUTPATIENT)
Dept: PSYCHIATRY | Facility: CLINIC | Age: 71
End: 2021-10-15
Payer: MEDICARE

## 2021-10-15 DIAGNOSIS — F32.A DEPRESSION, UNSPECIFIED DEPRESSION TYPE: Primary | ICD-10-CM

## 2021-10-15 DIAGNOSIS — F41.9 ANXIETY: ICD-10-CM

## 2021-10-15 PROCEDURE — 90834 PR PSYCHOTHERAPY W/PATIENT, 45 MIN: ICD-10-PCS | Mod: 95,,, | Performed by: SOCIAL WORKER

## 2021-10-15 PROCEDURE — 90834 PSYTX W PT 45 MINUTES: CPT | Mod: 95,,, | Performed by: SOCIAL WORKER

## 2021-10-21 ENCOUNTER — PATIENT MESSAGE (OUTPATIENT)
Dept: FAMILY MEDICINE | Facility: CLINIC | Age: 71
End: 2021-10-21
Payer: MEDICARE

## 2021-10-21 ENCOUNTER — PATIENT MESSAGE (OUTPATIENT)
Dept: FAMILY MEDICINE | Facility: CLINIC | Age: 71
End: 2021-10-21

## 2021-10-21 RX ORDER — PREDNISONE 20 MG/1
TABLET ORAL
Qty: 5 TABLET | Refills: 0 | Status: SHIPPED | OUTPATIENT
Start: 2021-10-21 | End: 2021-11-04 | Stop reason: ALTCHOICE

## 2021-10-22 ENCOUNTER — OFFICE VISIT (OUTPATIENT)
Dept: PSYCHIATRY | Facility: CLINIC | Age: 71
End: 2021-10-22
Payer: MEDICARE

## 2021-10-22 DIAGNOSIS — F32.A DEPRESSION, UNSPECIFIED DEPRESSION TYPE: Primary | ICD-10-CM

## 2021-10-22 DIAGNOSIS — F41.9 ANXIETY: ICD-10-CM

## 2021-10-22 PROCEDURE — 90834 PSYTX W PT 45 MINUTES: CPT | Mod: 95,,, | Performed by: SOCIAL WORKER

## 2021-10-22 PROCEDURE — 90834 PR PSYCHOTHERAPY W/PATIENT, 45 MIN: ICD-10-PCS | Mod: 95,,, | Performed by: SOCIAL WORKER

## 2021-11-03 ENCOUNTER — OFFICE VISIT (OUTPATIENT)
Dept: PSYCHIATRY | Facility: CLINIC | Age: 71
End: 2021-11-03
Payer: MEDICARE

## 2021-11-03 DIAGNOSIS — F41.9 ANXIETY: ICD-10-CM

## 2021-11-03 DIAGNOSIS — F32.A DEPRESSION, UNSPECIFIED DEPRESSION TYPE: Primary | ICD-10-CM

## 2021-11-03 PROCEDURE — 90837 PSYTX W PT 60 MINUTES: CPT | Mod: ,,, | Performed by: SOCIAL WORKER

## 2021-11-03 PROCEDURE — 90837 PR PSYCHOTHERAPY W/PATIENT, 60 MIN: ICD-10-PCS | Mod: ,,, | Performed by: SOCIAL WORKER

## 2021-11-04 ENCOUNTER — OFFICE VISIT (OUTPATIENT)
Dept: FAMILY MEDICINE | Facility: CLINIC | Age: 71
End: 2021-11-04
Payer: MEDICARE

## 2021-11-04 VITALS
HEIGHT: 72 IN | HEART RATE: 89 BPM | SYSTOLIC BLOOD PRESSURE: 122 MMHG | RESPIRATION RATE: 18 BRPM | DIASTOLIC BLOOD PRESSURE: 60 MMHG | OXYGEN SATURATION: 95 % | WEIGHT: 211 LBS | BODY MASS INDEX: 28.58 KG/M2 | TEMPERATURE: 98 F

## 2021-11-04 DIAGNOSIS — K21.9 GASTROESOPHAGEAL REFLUX DISEASE, UNSPECIFIED WHETHER ESOPHAGITIS PRESENT: ICD-10-CM

## 2021-11-04 DIAGNOSIS — E78.2 MIXED HYPERLIPIDEMIA: Primary | ICD-10-CM

## 2021-11-04 DIAGNOSIS — J30.9 CHRONIC ALLERGIC RHINITIS: ICD-10-CM

## 2021-11-04 DIAGNOSIS — I25.10 ATHEROSCLEROSIS OF CORONARY ARTERY, UNSPECIFIED VESSEL OR LESION TYPE, UNSPECIFIED WHETHER ANGINA PRESENT, UNSPECIFIED WHETHER NATIVE OR TRANSPLANTED HEART: ICD-10-CM

## 2021-11-04 DIAGNOSIS — J44.9 CHRONIC OBSTRUCTIVE PULMONARY DISEASE, UNSPECIFIED COPD TYPE: ICD-10-CM

## 2021-11-04 DIAGNOSIS — F33.1 MODERATE EPISODE OF RECURRENT MAJOR DEPRESSIVE DISORDER: ICD-10-CM

## 2021-11-04 PROCEDURE — 99214 OFFICE O/P EST MOD 30 MIN: CPT | Mod: S$GLB,,, | Performed by: NURSE PRACTITIONER

## 2021-11-04 PROCEDURE — 99214 PR OFFICE/OUTPT VISIT, EST, LEVL IV, 30-39 MIN: ICD-10-PCS | Mod: S$GLB,,, | Performed by: NURSE PRACTITIONER

## 2021-11-08 ENCOUNTER — LAB VISIT (OUTPATIENT)
Dept: LAB | Facility: HOSPITAL | Age: 71
End: 2021-11-08
Attending: ANESTHESIOLOGY
Payer: MEDICARE

## 2021-11-08 DIAGNOSIS — E78.2 MIXED HYPERLIPIDEMIA: ICD-10-CM

## 2021-11-08 LAB
ALBUMIN SERPL BCP-MCNC: 3.5 G/DL (ref 3.5–5.2)
ALP SERPL-CCNC: 71 U/L (ref 55–135)
ALT SERPL W/O P-5'-P-CCNC: 18 U/L (ref 10–44)
ANION GAP SERPL CALC-SCNC: 10 MMOL/L (ref 8–16)
AST SERPL-CCNC: 27 U/L (ref 10–40)
BILIRUB SERPL-MCNC: 1.1 MG/DL (ref 0.1–1)
BUN SERPL-MCNC: 6 MG/DL (ref 8–23)
CALCIUM SERPL-MCNC: 9 MG/DL (ref 8.7–10.5)
CHLORIDE SERPL-SCNC: 103 MMOL/L (ref 95–110)
CHOLEST SERPL-MCNC: 173 MG/DL (ref 120–199)
CHOLEST/HDLC SERPL: 3.5 {RATIO} (ref 2–5)
CO2 SERPL-SCNC: 28 MMOL/L (ref 23–29)
CREAT SERPL-MCNC: 0.9 MG/DL (ref 0.5–1.4)
EST. GFR  (AFRICAN AMERICAN): >60 ML/MIN/1.73 M^2
EST. GFR  (NON AFRICAN AMERICAN): >60 ML/MIN/1.73 M^2
GLUCOSE SERPL-MCNC: 103 MG/DL (ref 70–110)
HDLC SERPL-MCNC: 49 MG/DL (ref 40–75)
HDLC SERPL: 28.3 % (ref 20–50)
LDLC SERPL CALC-MCNC: 96.6 MG/DL (ref 63–159)
NONHDLC SERPL-MCNC: 124 MG/DL
POTASSIUM SERPL-SCNC: 5 MMOL/L (ref 3.5–5.1)
PROT SERPL-MCNC: 7.1 G/DL (ref 6–8.4)
SODIUM SERPL-SCNC: 141 MMOL/L (ref 136–145)
TRIGL SERPL-MCNC: 137 MG/DL (ref 30–150)

## 2021-11-08 PROCEDURE — 80053 COMPREHEN METABOLIC PANEL: CPT | Performed by: NURSE PRACTITIONER

## 2021-11-08 PROCEDURE — 80061 LIPID PANEL: CPT | Performed by: NURSE PRACTITIONER

## 2021-11-08 PROCEDURE — 36415 COLL VENOUS BLD VENIPUNCTURE: CPT | Mod: PO | Performed by: NURSE PRACTITIONER

## 2021-11-09 ENCOUNTER — PATIENT MESSAGE (OUTPATIENT)
Dept: FAMILY MEDICINE | Facility: CLINIC | Age: 71
End: 2021-11-09
Payer: MEDICARE

## 2021-11-10 ENCOUNTER — OFFICE VISIT (OUTPATIENT)
Dept: PSYCHIATRY | Facility: CLINIC | Age: 71
End: 2021-11-10
Payer: MEDICARE

## 2021-11-10 DIAGNOSIS — F33.1 MODERATE EPISODE OF RECURRENT MAJOR DEPRESSIVE DISORDER: Primary | ICD-10-CM

## 2021-11-10 DIAGNOSIS — F41.9 ANXIETY: ICD-10-CM

## 2021-11-10 PROCEDURE — 90834 PR PSYCHOTHERAPY W/PATIENT, 45 MIN: ICD-10-PCS | Mod: ,,, | Performed by: SOCIAL WORKER

## 2021-11-10 PROCEDURE — 90834 PSYTX W PT 45 MINUTES: CPT | Mod: ,,, | Performed by: SOCIAL WORKER

## 2021-11-17 ENCOUNTER — OFFICE VISIT (OUTPATIENT)
Dept: PSYCHIATRY | Facility: CLINIC | Age: 71
End: 2021-11-17
Payer: MEDICARE

## 2021-11-17 DIAGNOSIS — F33.1 MODERATE EPISODE OF RECURRENT MAJOR DEPRESSIVE DISORDER: Primary | ICD-10-CM

## 2021-11-17 DIAGNOSIS — F41.9 ANXIETY: ICD-10-CM

## 2021-11-17 PROCEDURE — 90834 PR PSYCHOTHERAPY W/PATIENT, 45 MIN: ICD-10-PCS | Mod: ,,, | Performed by: SOCIAL WORKER

## 2021-11-17 PROCEDURE — 90834 PSYTX W PT 45 MINUTES: CPT | Mod: ,,, | Performed by: SOCIAL WORKER

## 2021-11-18 ENCOUNTER — PATIENT MESSAGE (OUTPATIENT)
Dept: FAMILY MEDICINE | Facility: CLINIC | Age: 71
End: 2021-11-18
Payer: MEDICARE

## 2021-11-19 ENCOUNTER — IMMUNIZATION (OUTPATIENT)
Dept: FAMILY MEDICINE | Facility: CLINIC | Age: 71
End: 2021-11-19
Payer: MEDICARE

## 2021-11-19 DIAGNOSIS — Z23 NEED FOR VACCINATION: Primary | ICD-10-CM

## 2021-11-19 PROCEDURE — 0004A COVID-19, MRNA, LNP-S, PF, 30 MCG/0.3 ML DOSE VACCINE: CPT | Mod: PBBFAC,PO

## 2021-11-24 ENCOUNTER — OFFICE VISIT (OUTPATIENT)
Dept: PSYCHIATRY | Facility: CLINIC | Age: 71
End: 2021-11-24
Payer: MEDICARE

## 2021-11-24 DIAGNOSIS — F41.9 ANXIETY: Primary | ICD-10-CM

## 2021-11-24 DIAGNOSIS — F33.1 MODERATE EPISODE OF RECURRENT MAJOR DEPRESSIVE DISORDER: ICD-10-CM

## 2021-11-24 PROCEDURE — 90834 PSYTX W PT 45 MINUTES: CPT | Mod: ,,, | Performed by: SOCIAL WORKER

## 2021-11-24 PROCEDURE — 90834 PR PSYCHOTHERAPY W/PATIENT, 45 MIN: ICD-10-PCS | Mod: ,,, | Performed by: SOCIAL WORKER

## 2021-11-29 ENCOUNTER — PATIENT MESSAGE (OUTPATIENT)
Dept: ALLERGY | Facility: CLINIC | Age: 71
End: 2021-11-29
Payer: MEDICARE

## 2021-11-30 ENCOUNTER — CLINICAL SUPPORT (OUTPATIENT)
Dept: ALLERGY | Facility: CLINIC | Age: 71
End: 2021-11-30
Payer: MEDICARE

## 2021-11-30 ENCOUNTER — PATIENT MESSAGE (OUTPATIENT)
Dept: ALLERGY | Facility: CLINIC | Age: 71
End: 2021-11-30

## 2021-11-30 DIAGNOSIS — J30.1 NON-SEASONAL ALLERGIC RHINITIS DUE TO POLLEN: ICD-10-CM

## 2021-11-30 PROCEDURE — 99499 NO LOS: ICD-10-PCS | Mod: S$PBB,,, | Performed by: ALLERGY & IMMUNOLOGY

## 2021-11-30 PROCEDURE — 99499 UNLISTED E&M SERVICE: CPT | Mod: S$PBB,,, | Performed by: ALLERGY & IMMUNOLOGY

## 2021-11-30 PROCEDURE — 95117 IMMUNOTHERAPY INJECTIONS: CPT | Mod: PBBFAC,PO

## 2021-12-01 ENCOUNTER — OFFICE VISIT (OUTPATIENT)
Dept: PSYCHIATRY | Facility: CLINIC | Age: 71
End: 2021-12-01
Payer: MEDICARE

## 2021-12-01 DIAGNOSIS — F33.1 MODERATE EPISODE OF RECURRENT MAJOR DEPRESSIVE DISORDER: Primary | ICD-10-CM

## 2021-12-01 DIAGNOSIS — F41.9 ANXIETY: ICD-10-CM

## 2021-12-01 PROCEDURE — 90834 PSYTX W PT 45 MINUTES: CPT | Mod: ,,, | Performed by: SOCIAL WORKER

## 2021-12-01 PROCEDURE — 90834 PR PSYCHOTHERAPY W/PATIENT, 45 MIN: ICD-10-PCS | Mod: ,,, | Performed by: SOCIAL WORKER

## 2021-12-08 ENCOUNTER — OFFICE VISIT (OUTPATIENT)
Dept: PSYCHIATRY | Facility: CLINIC | Age: 71
End: 2021-12-08
Payer: MEDICARE

## 2021-12-08 DIAGNOSIS — F41.9 ANXIETY: ICD-10-CM

## 2021-12-08 DIAGNOSIS — F33.1 MODERATE EPISODE OF RECURRENT MAJOR DEPRESSIVE DISORDER: Primary | ICD-10-CM

## 2021-12-08 PROCEDURE — 90834 PSYTX W PT 45 MINUTES: CPT | Mod: ,,, | Performed by: SOCIAL WORKER

## 2021-12-08 PROCEDURE — 90834 PR PSYCHOTHERAPY W/PATIENT, 45 MIN: ICD-10-PCS | Mod: ,,, | Performed by: SOCIAL WORKER

## 2021-12-09 ENCOUNTER — CLINICAL SUPPORT (OUTPATIENT)
Dept: ALLERGY | Facility: CLINIC | Age: 71
End: 2021-12-09
Payer: MEDICARE

## 2021-12-09 DIAGNOSIS — J30.1 NON-SEASONAL ALLERGIC RHINITIS DUE TO POLLEN: ICD-10-CM

## 2021-12-09 PROCEDURE — 95117 IMMUNOTHERAPY INJECTIONS: CPT | Mod: PBBFAC,PO

## 2021-12-09 PROCEDURE — 99499 NO LOS: ICD-10-PCS | Mod: S$PBB,,, | Performed by: ALLERGY & IMMUNOLOGY

## 2021-12-09 PROCEDURE — 99999 PR PBB SHADOW E&M-EST. PATIENT-LVL I: CPT | Mod: PBBFAC,,,

## 2021-12-09 PROCEDURE — 99999 PR PBB SHADOW E&M-EST. PATIENT-LVL I: ICD-10-PCS | Mod: PBBFAC,,,

## 2021-12-09 PROCEDURE — 99499 UNLISTED E&M SERVICE: CPT | Mod: S$PBB,,, | Performed by: ALLERGY & IMMUNOLOGY

## 2021-12-14 ENCOUNTER — PATIENT OUTREACH (OUTPATIENT)
Dept: ADMINISTRATIVE | Facility: OTHER | Age: 71
End: 2021-12-14
Payer: MEDICARE

## 2021-12-15 ENCOUNTER — OFFICE VISIT (OUTPATIENT)
Dept: PSYCHIATRY | Facility: CLINIC | Age: 71
End: 2021-12-15
Payer: MEDICARE

## 2021-12-15 DIAGNOSIS — F41.9 ANXIETY: ICD-10-CM

## 2021-12-15 DIAGNOSIS — F33.1 MODERATE EPISODE OF RECURRENT MAJOR DEPRESSIVE DISORDER: Primary | ICD-10-CM

## 2021-12-15 PROCEDURE — 90834 PR PSYCHOTHERAPY W/PATIENT, 45 MIN: ICD-10-PCS | Mod: ,,, | Performed by: SOCIAL WORKER

## 2021-12-15 PROCEDURE — 90834 PSYTX W PT 45 MINUTES: CPT | Mod: ,,, | Performed by: SOCIAL WORKER

## 2021-12-16 ENCOUNTER — CLINICAL SUPPORT (OUTPATIENT)
Dept: ALLERGY | Facility: CLINIC | Age: 71
End: 2021-12-16
Payer: MEDICARE

## 2021-12-16 DIAGNOSIS — J30.1 NON-SEASONAL ALLERGIC RHINITIS DUE TO POLLEN: ICD-10-CM

## 2021-12-16 PROCEDURE — 99999 PR PBB SHADOW E&M-EST. PATIENT-LVL I: ICD-10-PCS | Mod: PBBFAC,,,

## 2021-12-16 PROCEDURE — 95117 IMMUNOTHERAPY INJECTIONS: CPT | Mod: PBBFAC,PO

## 2021-12-16 PROCEDURE — 99499 UNLISTED E&M SERVICE: CPT | Mod: S$PBB,,, | Performed by: ALLERGY & IMMUNOLOGY

## 2021-12-16 PROCEDURE — 99999 PR PBB SHADOW E&M-EST. PATIENT-LVL I: CPT | Mod: PBBFAC,,,

## 2021-12-16 PROCEDURE — 99499 NO LOS: ICD-10-PCS | Mod: S$PBB,,, | Performed by: ALLERGY & IMMUNOLOGY

## 2021-12-23 ENCOUNTER — CLINICAL SUPPORT (OUTPATIENT)
Dept: ALLERGY | Facility: CLINIC | Age: 71
End: 2021-12-23
Payer: MEDICARE

## 2021-12-23 DIAGNOSIS — J30.1 NON-SEASONAL ALLERGIC RHINITIS DUE TO POLLEN: ICD-10-CM

## 2021-12-23 PROCEDURE — 99499 NO LOS: ICD-10-PCS | Mod: S$PBB,,, | Performed by: ALLERGY & IMMUNOLOGY

## 2021-12-23 PROCEDURE — 95117 IMMUNOTHERAPY INJECTIONS: CPT | Mod: PBBFAC,PO

## 2021-12-23 PROCEDURE — 99499 UNLISTED E&M SERVICE: CPT | Mod: S$PBB,,, | Performed by: ALLERGY & IMMUNOLOGY

## 2021-12-30 ENCOUNTER — CLINICAL SUPPORT (OUTPATIENT)
Dept: ALLERGY | Facility: CLINIC | Age: 71
End: 2021-12-30
Payer: MEDICARE

## 2021-12-30 DIAGNOSIS — J30.1 NON-SEASONAL ALLERGIC RHINITIS DUE TO POLLEN: ICD-10-CM

## 2021-12-30 PROCEDURE — 95117 IMMUNOTHERAPY INJECTIONS: CPT | Mod: PBBFAC,PO

## 2021-12-30 PROCEDURE — 99499 UNLISTED E&M SERVICE: CPT | Mod: S$PBB,,, | Performed by: ALLERGY & IMMUNOLOGY

## 2021-12-30 PROCEDURE — 99499 NO LOS: ICD-10-PCS | Mod: S$PBB,,, | Performed by: ALLERGY & IMMUNOLOGY

## 2022-01-01 ENCOUNTER — PATIENT MESSAGE (OUTPATIENT)
Dept: FAMILY MEDICINE | Facility: CLINIC | Age: 72
End: 2022-01-01
Payer: MEDICARE

## 2022-01-01 ENCOUNTER — LAB VISIT (OUTPATIENT)
Dept: LAB | Facility: HOSPITAL | Age: 72
End: 2022-01-01
Payer: MEDICARE

## 2022-01-01 DIAGNOSIS — E78.2 MIXED HYPERLIPIDEMIA: ICD-10-CM

## 2022-01-01 LAB
ALBUMIN SERPL BCP-MCNC: 3.2 G/DL (ref 3.5–5.2)
ALP SERPL-CCNC: 55 U/L (ref 55–135)
ALT SERPL W/O P-5'-P-CCNC: 35 U/L (ref 10–44)
ANION GAP SERPL CALC-SCNC: 8 MMOL/L (ref 8–16)
AST SERPL-CCNC: 24 U/L (ref 10–40)
BILIRUB SERPL-MCNC: 1.8 MG/DL (ref 0.1–1)
BUN SERPL-MCNC: 18 MG/DL (ref 8–23)
CALCIUM SERPL-MCNC: 8.3 MG/DL (ref 8.7–10.5)
CHLORIDE SERPL-SCNC: 100 MMOL/L (ref 95–110)
CHOLEST SERPL-MCNC: 172 MG/DL (ref 120–199)
CHOLEST/HDLC SERPL: 1.7 {RATIO} (ref 2–5)
CO2 SERPL-SCNC: 28 MMOL/L (ref 23–29)
CREAT SERPL-MCNC: 0.8 MG/DL (ref 0.5–1.4)
EST. GFR  (NO RACE VARIABLE): >60 ML/MIN/1.73 M^2
GLUCOSE SERPL-MCNC: 82 MG/DL (ref 70–110)
HDLC SERPL-MCNC: 101 MG/DL (ref 40–75)
HDLC SERPL: 58.7 % (ref 20–50)
LDLC SERPL CALC-MCNC: 58.6 MG/DL (ref 63–159)
NONHDLC SERPL-MCNC: 71 MG/DL
POTASSIUM SERPL-SCNC: 4.1 MMOL/L (ref 3.5–5.1)
PROT SERPL-MCNC: 5.9 G/DL (ref 6–8.4)
SODIUM SERPL-SCNC: 136 MMOL/L (ref 136–145)
TRIGL SERPL-MCNC: 62 MG/DL (ref 30–150)

## 2022-01-01 PROCEDURE — 36415 COLL VENOUS BLD VENIPUNCTURE: CPT | Mod: PO | Performed by: NURSE PRACTITIONER

## 2022-01-01 PROCEDURE — 80053 COMPREHEN METABOLIC PANEL: CPT | Performed by: NURSE PRACTITIONER

## 2022-01-01 PROCEDURE — 80061 LIPID PANEL: CPT | Performed by: NURSE PRACTITIONER

## 2022-01-05 ENCOUNTER — OFFICE VISIT (OUTPATIENT)
Dept: PSYCHIATRY | Facility: CLINIC | Age: 72
End: 2022-01-05
Payer: MEDICARE

## 2022-01-05 DIAGNOSIS — F41.9 ANXIETY: ICD-10-CM

## 2022-01-05 DIAGNOSIS — F33.1 MODERATE EPISODE OF RECURRENT MAJOR DEPRESSIVE DISORDER: Primary | ICD-10-CM

## 2022-01-05 PROCEDURE — 90837 PSYTX W PT 60 MINUTES: CPT | Mod: ,,, | Performed by: SOCIAL WORKER

## 2022-01-05 PROCEDURE — 90837 PR PSYCHOTHERAPY W/PATIENT, 60 MIN: ICD-10-PCS | Mod: ,,, | Performed by: SOCIAL WORKER

## 2022-01-05 NOTE — PROGRESS NOTES
Individual Psychotherapy (PhD/LCSW)    Date: 1/5/2022    Site:  [facility] PSYCHIATRY  [facility] [place]     Therapeutic Intervention: Met with patient.  Outpatient - Supportive psychotherapy 45 min - CPT Code 30488    Chief complaint/reason for encounter: depression and anxiety     Interval history and content of current session: Reviewed chart.     Client processed coming to terms with his mortality. Clt notes that his breathing issues were worse last week. Clt contemplating how he wants to spend his time this next year before turning 73 yo. Therapist used BSP with client to process whether he wants to go to parade, burnette re-enactment, and/or trip to [place]. Clt decided that he wants to choose quality of life over quantity. Clt processed how friend in [place]'s behavior can be erratic due to Parkinson's, will leave if becomes detrimental to him.    Treatment plan:  · Target symptoms: depression, anxiety   · Why chosen therapy is appropriate versus another modality: relevant to diagnosis  · Outcome monitoring methods: self-report  · Therapeutic intervention type: insight oriented psychotherapy, supportive psychotherapy    Risk parameters:  Patient reports no suicidal ideation  Patient reports no homicidal ideation  Patient reports no self-injurious behavior  Patient reports no violent behavior    Verbal deficits: None    Patient's response to intervention:  The patient's response to intervention is accepting.    Progress toward goals and other mental status changes:  The patient's progress toward goals is good.    Diagnosis:   1. Moderate episode of recurrent major depressive disorder    2. Anxiety        Plan:  individual psychotherapy    Return to clinic: 1 week    Length of Service (minutes): 57 minutes

## 2022-01-06 ENCOUNTER — CLINICAL SUPPORT (OUTPATIENT)
Dept: ALLERGY | Facility: CLINIC | Age: 72
End: 2022-01-06
Payer: MEDICARE

## 2022-01-06 DIAGNOSIS — J30.1 NON-SEASONAL ALLERGIC RHINITIS DUE TO POLLEN: ICD-10-CM

## 2022-01-06 PROCEDURE — 95117 IMMUNOTHERAPY INJECTIONS: CPT | Mod: PBBFAC,PO

## 2022-01-06 PROCEDURE — 99499 NO LOS: ICD-10-PCS | Mod: S$PBB,,, | Performed by: ALLERGY & IMMUNOLOGY

## 2022-01-06 PROCEDURE — 99499 UNLISTED E&M SERVICE: CPT | Mod: S$PBB,,, | Performed by: ALLERGY & IMMUNOLOGY

## 2022-01-06 NOTE — PROGRESS NOTES
Pt received two Allergy shots, 0.1ml of vial 1: 1000 (green) waited in clinic 30 min, no reactions Documented in XIS.

## 2022-01-13 ENCOUNTER — OFFICE VISIT (OUTPATIENT)
Dept: PSYCHIATRY | Facility: CLINIC | Age: 72
End: 2022-01-13
Payer: MEDICARE

## 2022-01-13 ENCOUNTER — CLINICAL SUPPORT (OUTPATIENT)
Dept: ALLERGY | Facility: CLINIC | Age: 72
End: 2022-01-13
Payer: MEDICARE

## 2022-01-13 DIAGNOSIS — F33.1 MODERATE EPISODE OF RECURRENT MAJOR DEPRESSIVE DISORDER: Primary | ICD-10-CM

## 2022-01-13 DIAGNOSIS — J30.1 NON-SEASONAL ALLERGIC RHINITIS DUE TO POLLEN: ICD-10-CM

## 2022-01-13 DIAGNOSIS — F41.9 ANXIETY: ICD-10-CM

## 2022-01-13 PROCEDURE — 99499 UNLISTED E&M SERVICE: CPT | Mod: S$PBB,,, | Performed by: ALLERGY & IMMUNOLOGY

## 2022-01-13 PROCEDURE — 99499 NO LOS: ICD-10-PCS | Mod: S$PBB,,, | Performed by: ALLERGY & IMMUNOLOGY

## 2022-01-13 PROCEDURE — 90834 PR PSYCHOTHERAPY W/PATIENT, 45 MIN: ICD-10-PCS | Mod: ,,, | Performed by: SOCIAL WORKER

## 2022-01-13 PROCEDURE — 90834 PSYTX W PT 45 MINUTES: CPT | Mod: ,,, | Performed by: SOCIAL WORKER

## 2022-01-13 PROCEDURE — 95117 IMMUNOTHERAPY INJECTIONS: CPT | Mod: PBBFAC,PO

## 2022-01-13 NOTE — PROGRESS NOTES
"Individual Psychotherapy (PhD/LCSW)    Date: 2022    Site:  [facility] PSYCHIATRY  [facility] [place]     Therapeutic Intervention: Met with patient.  Outpatient - Supportive psychotherapy 45 min - CPT Code 05180    Chief complaint/reason for encounter: depression and anxiety     Interval history and content of current session: Reviewed chart.     Client notes he is "ambivalent" about trip to [place]. Processed negative memories associated with [place], such as going [place] with ex-wife and step-son right before divorce. Clt did see step-son at 20 but using drugs and went back to [place]. Clt processed how alex was able to eventually stay sober and they stayed in touch but  at 44 from a heart attack in  of last year. Clt also processed how he got a DWI in [place] after going to [place] and going to a bar due to grief over divorce. Clt notes that he would like to go to [place] again, Dorothea had told him they would go together but not sure if that will happen due to fear of spending money. Will see if there is a sign tomorrow of not going to [place] and if not, will go to visit friend with Parkinson's.     Treatment plan:  · Target symptoms: depression, anxiety   · Why chosen therapy is appropriate versus another modality: relevant to diagnosis  · Outcome monitoring methods: self-report  · Therapeutic intervention type: insight oriented psychotherapy, supportive psychotherapy    Risk parameters:  Patient reports no suicidal ideation  Patient reports no homicidal ideation  Patient reports no self-injurious behavior  Patient reports no violent behavior    Verbal deficits: None    Patient's response to intervention:  The patient's response to intervention is accepting.    Progress toward goals and other mental status changes:  The patient's progress toward goals is good.    Diagnosis:   1. Moderate episode of recurrent major depressive disorder    2. Anxiety        Plan:  individual " psychotherapy    Return to clinic: 1 week    Length of Service (minutes): 50 minutes

## 2022-01-13 NOTE — PROGRESS NOTES
Pt received two Allergy shots, 0.2ml of vial 1: 1000 (green) waited in clinic 30 min, no reactions Documented in XIS.

## 2022-01-14 ENCOUNTER — PATIENT OUTREACH (OUTPATIENT)
Dept: ADMINISTRATIVE | Facility: OTHER | Age: 72
End: 2022-01-14
Payer: MEDICARE

## 2022-01-14 NOTE — PROGRESS NOTES
Health Maintenance Due   Topic Date Due    TETANUS VACCINE  Never done    High Dose Statin  Never done    Shingles Vaccine (1 of 2) Never done     Updates were requested from care everywhere.  Chart was reviewed for overdue Proactive [facility] Encounters ([facility]) topics (CRS, Breast Cancer Screening, Eye exam)  Health Maintenance has been updated.  LINKS immunization registry triggered.  Immunizations were reconciled.

## 2022-01-20 ENCOUNTER — CLINICAL SUPPORT (OUTPATIENT)
Dept: ALLERGY | Facility: CLINIC | Age: 72
End: 2022-01-20
Payer: MEDICARE

## 2022-01-20 ENCOUNTER — OFFICE VISIT (OUTPATIENT)
Dept: PSYCHIATRY | Facility: CLINIC | Age: 72
End: 2022-01-20
Payer: MEDICARE

## 2022-01-20 DIAGNOSIS — F41.9 ANXIETY: ICD-10-CM

## 2022-01-20 DIAGNOSIS — J30.1 NON-SEASONAL ALLERGIC RHINITIS DUE TO POLLEN: ICD-10-CM

## 2022-01-20 DIAGNOSIS — F33.1 MODERATE EPISODE OF RECURRENT MAJOR DEPRESSIVE DISORDER: Primary | ICD-10-CM

## 2022-01-20 PROCEDURE — 99499 NO LOS: ICD-10-PCS | Mod: S$PBB,,, | Performed by: ALLERGY & IMMUNOLOGY

## 2022-01-20 PROCEDURE — 95117 IMMUNOTHERAPY INJECTIONS: CPT | Mod: PBBFAC,PO

## 2022-01-20 PROCEDURE — 90834 PSYTX W PT 45 MINUTES: CPT | Mod: ,,, | Performed by: SOCIAL WORKER

## 2022-01-20 PROCEDURE — 90834 PR PSYCHOTHERAPY W/PATIENT, 45 MIN: ICD-10-PCS | Mod: ,,, | Performed by: SOCIAL WORKER

## 2022-01-20 PROCEDURE — 99499 UNLISTED E&M SERVICE: CPT | Mod: S$PBB,,, | Performed by: ALLERGY & IMMUNOLOGY

## 2022-01-20 NOTE — PROGRESS NOTES
Pt received two Allergy shots, 0.3ml of vial 1: 1000 (green) waited in clinic 30 min, no reactions Documented in XIS.
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
## 2022-01-27 ENCOUNTER — OFFICE VISIT (OUTPATIENT)
Dept: PSYCHIATRY | Facility: CLINIC | Age: 72
End: 2022-01-27
Payer: MEDICARE

## 2022-01-27 DIAGNOSIS — F41.9 ANXIETY: ICD-10-CM

## 2022-01-27 DIAGNOSIS — F33.1 MODERATE EPISODE OF RECURRENT MAJOR DEPRESSIVE DISORDER: Primary | ICD-10-CM

## 2022-01-27 PROCEDURE — 90785 PSYTX COMPLEX INTERACTIVE: CPT | Mod: ,,, | Performed by: SOCIAL WORKER

## 2022-01-27 PROCEDURE — 90834 PSYTX W PT 45 MINUTES: CPT | Mod: ,,, | Performed by: SOCIAL WORKER

## 2022-01-27 PROCEDURE — 90834 PR PSYCHOTHERAPY W/PATIENT, 45 MIN: ICD-10-PCS | Mod: ,,, | Performed by: SOCIAL WORKER

## 2022-01-27 PROCEDURE — 90785 PR INTERACTIVE COMPLEXITY: ICD-10-PCS | Mod: ,,, | Performed by: SOCIAL WORKER

## 2022-01-27 NOTE — PROGRESS NOTES
"Individual Psychotherapy (PhD/LCSW)    Date: 1/27/2022    Site:  [facility] PS[facility]CHIATRY  [facility] [place]     Therapeutic Intervention: Met with patient.  Outpatient - Supportive psychotherapy 45 min - CPT Code 75378    Chief complaint/reason for encounter: depression and anxiety     Interval history and content of current session: Reviewed chart.     Client reports that he left doctor's appointment earlier and needs to make amends with nurse for leaving. Clt has been contemplating whether it is worth it for him to get allergy shots. Believes element of allergies may be mental. Therapist used BSP(one-eye) with client to process core belief of himself as "sickly" due to being sickly as a child. Clt processed trauma associated with pediatrician. 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 Therapist did parts work with client having him dialogue with sickly child. Clt plans to use visualization technique of putting on [employer] academy uniform in the mornings. Therapist encouraged clt to use BLS during visualization.     Treatment plan:  · Target symptoms: depression, anxiety   · Why chosen therapy is appropriate versus another modality: relevant to diagnosis  · Outcome monitoring methods: self-report  · Therapeutic intervention type: insight oriented psychotherapy, supportive psychotherapy    Risk parameters:  Patient reports no suicidal ideation  Patient reports no homicidal ideation  Patient reports no self-injurious behavior  Patient reports no violent behavior    Verbal deficits: None    Patient's response to intervention:  The patient's response to intervention is accepting.    Progress toward goals and other mental status changes:  The patient's progress toward goals is good.    Diagnosis:   1. Moderate episode of recurrent major depressive disorder  "   2. Anxiety        Plan:  individual psychotherapy    Return to clinic: 1 week    Length of Service (minutes): 50 minutes

## 2022-02-03 ENCOUNTER — CLINICAL SUPPORT (OUTPATIENT)
Dept: ALLERGY | Facility: CLINIC | Age: 72
End: 2022-02-03
Payer: MEDICARE

## 2022-02-03 ENCOUNTER — OFFICE VISIT (OUTPATIENT)
Dept: PSYCHIATRY | Facility: CLINIC | Age: 72
End: 2022-02-03
Payer: MEDICARE

## 2022-02-03 DIAGNOSIS — F41.9 ANXIETY: ICD-10-CM

## 2022-02-03 DIAGNOSIS — J30.1 NON-SEASONAL ALLERGIC RHINITIS DUE TO POLLEN: ICD-10-CM

## 2022-02-03 DIAGNOSIS — F33.1 MODERATE EPISODE OF RECURRENT MAJOR DEPRESSIVE DISORDER: Primary | ICD-10-CM

## 2022-02-03 PROCEDURE — 99499 NO LOS: ICD-10-PCS | Mod: S$PBB,,, | Performed by: ALLERGY & IMMUNOLOGY

## 2022-02-03 PROCEDURE — 99499 UNLISTED E&M SERVICE: CPT | Mod: S$PBB,,, | Performed by: ALLERGY & IMMUNOLOGY

## 2022-02-03 PROCEDURE — 90837 PSYTX W PT 60 MINUTES: CPT | Mod: 95,,, | Performed by: SOCIAL WORKER

## 2022-02-03 PROCEDURE — 90837 PR PSYCHOTHERAPY W/PATIENT, 60 MIN: ICD-10-PCS | Mod: 95,,, | Performed by: SOCIAL WORKER

## 2022-02-03 PROCEDURE — 95115 IMMUNOTHERAPY ONE INJECTION: CPT | Mod: PBBFAC,PO

## 2022-02-03 NOTE — PROGRESS NOTES
"    Individual Psychotherapy (PhD/LCSW)    Date: 2/3/2022     The patient location is: [address] [place] [id number]  The chief complaint leading to consultation is: depression, anxiety  Visit type: Virtual visit with synchronous audio and video  Total time spent with patient: 55 min  Each patient to whom he or she provides medical services by telemedicine is:  (1) informed of the relationship between the physician and patient and the respective role of any other health care provider with respect to management of the patient; and (2) notified that he or she may decline to receive medical services by telemedicine and may withdraw from such care at any time.    Site:  [facility]  [facility] [place]     Therapeutic Intervention: Met with patient.  Outpatient -- CPT Code 24129    Chief complaint/reason for encounter: depression and anxiety     Interval history and content of current session: Reviewed chart.     Client reports that he has appointment set up with hypnotist tomorrow to work on his allergies, believes there may be a mental component to them. Clt processed how ex-wife frustrates him when he mentions medical issues with her, has decided to set boundary by not speaking about them with her. Clt processed how he tends to have depression and boredom surface when life is stable. Therapist processed with client how he has sought intensity throughout his life and how euphoric recall tends to come up regarding the past. Clt processed how he would frame negative situations as "well at least I'm not at [employer]). Clt notes that at times he feels "stuck" due to limitations with his cough. Therapist encouraged clt to rewrite this story using narrative therapy. Clt processed how he has been reading book he wrote about [employer] and is able to see positives in his writing style and able to take on positive attributes of that character.     Treatment " plan:  · Target symptoms: depression, anxiety   · Why chosen therapy is appropriate versus another modality: relevant to diagnosis  · Outcome monitoring methods: self-report  · Therapeutic intervention type: insight oriented psychotherapy, supportive psychotherapy    Risk parameters:  Patient reports no suicidal ideation  Patient reports no homicidal ideation  Patient reports no self-injurious behavior  Patient reports no violent behavior    Verbal deficits: None    Patient's response to intervention:  The patient's response to intervention is accepting.    Progress toward goals and other mental status changes:  The patient's progress toward goals is good.    Diagnosis:   1. Moderate episode of recurrent major depressive disorder    2. Anxiety        Plan:  individual psychotherapy    Return to clinic: 1 week    Length of Service (minutes): 55 minutes

## 2022-02-03 NOTE — PROGRESS NOTES
Pt received two Allergy shots, 0.35 ml of vial 1: 1000 (green) waited in clinic 30 min, no reactions Documented in XIS.

## 2022-02-10 ENCOUNTER — CLINICAL SUPPORT (OUTPATIENT)
Dept: ALLERGY | Facility: CLINIC | Age: 72
End: 2022-02-10
Payer: MEDICARE

## 2022-02-10 ENCOUNTER — OFFICE VISIT (OUTPATIENT)
Dept: PSYCHIATRY | Facility: CLINIC | Age: 72
End: 2022-02-10
Payer: MEDICARE

## 2022-02-10 DIAGNOSIS — F41.9 ANXIETY: ICD-10-CM

## 2022-02-10 DIAGNOSIS — J30.1 NON-SEASONAL ALLERGIC RHINITIS DUE TO POLLEN: ICD-10-CM

## 2022-02-10 DIAGNOSIS — F33.1 MODERATE EPISODE OF RECURRENT MAJOR DEPRESSIVE DISORDER: Primary | ICD-10-CM

## 2022-02-10 PROCEDURE — 90837 PSYTX W PT 60 MINUTES: CPT | Mod: ,,, | Performed by: SOCIAL WORKER

## 2022-02-10 PROCEDURE — 99499 NO LOS: ICD-10-PCS | Mod: S$PBB,,, | Performed by: ALLERGY & IMMUNOLOGY

## 2022-02-10 PROCEDURE — 95117 IMMUNOTHERAPY INJECTIONS: CPT | Mod: PBBFAC,PO

## 2022-02-10 PROCEDURE — 90837 PR PSYCHOTHERAPY W/PATIENT, 60 MIN: ICD-10-PCS | Mod: ,,, | Performed by: SOCIAL WORKER

## 2022-02-10 PROCEDURE — 99499 UNLISTED E&M SERVICE: CPT | Mod: S$PBB,,, | Performed by: ALLERGY & IMMUNOLOGY

## 2022-02-10 NOTE — PROGRESS NOTES
"    Individual Psychotherapy (PhD/LCSW)    Date: 2/10/2022     The patient location is: [address] [place] [id number]  The chief complaint leading to consultation is: depression, anxiety  Visit type: Virtual visit with synchronous audio and video  Total time spent with patient: 55 min  Each patient to whom he or she provides medical services by telemedicine is:  (1) informed of the relationship between the physician and patient and the respective role of any other health care provider with respect to management of the patient; and (2) notified that he or she may decline to receive medical services by telemedicine and may withdraw from such care at any time.    Site:  [facility]AT[facility] [place]     Therapeutic Intervention: Met with patient.  Outpatient -- CPT Code 50353    Chief complaint/reason for encounter: depression and anxiety     Interval history and content of current session: Reviewed chart.     Client reports that he has been coughing less since doing two sessions with hypnotist. Plans to follow up with him regarding request to make tape similar to first session, which worked better than the 2nd session. Clt reports that he has made more friends recently and is having more social engagements coming up. Clt is now beginning to feel there is a sense of "normalcy" in his life. Clt reports that he has been looking at more of the positives associated with being single as well. Clt processed how he has issues with moderation regarding not caring enough about others or trying to care for them too much. Clt processed advice he gave to someone else in the past about not trying to "fix" others, not his responsibility. Clt processed letting go of the "angst" that he associates with theater and how he has been writing again daily, which he has been enjoying. Clt processed how he tends to think he "should" be busy and that he used to have difficulty with " enjoying things bc that meant he would be unmotivated. Now beginning to shift that pattern.     Treatment plan:  · Target symptoms: depression, anxiety   · Why chosen therapy is appropriate versus another modality: relevant to diagnosis  · Outcome monitoring methods: self-report  · Therapeutic intervention type: insight oriented psychotherapy, supportive psychotherapy    Risk parameters:  Patient reports no suicidal ideation  Patient reports no homicidal ideation  Patient reports no self-injurious behavior  Patient reports no violent behavior    Verbal deficits: None    Patient's response to intervention:  The patient's response to intervention is accepting.    Progress toward goals and other mental status changes:  The patient's progress toward goals is good.    Diagnosis:   1. Moderate episode of recurrent major depressive disorder    2. Anxiety        Plan:  individual psychotherapy    Return to clinic: 1 week    Length of Service (minutes): 55 minutes

## 2022-02-15 ENCOUNTER — PATIENT OUTREACH (OUTPATIENT)
Dept: ADMINISTRATIVE | Facility: OTHER | Age: 72
End: 2022-02-15
Payer: MEDICARE

## 2022-02-17 ENCOUNTER — CLINICAL SUPPORT (OUTPATIENT)
Dept: ALLERGY | Facility: CLINIC | Age: 72
End: 2022-02-17
Payer: MEDICARE

## 2022-02-17 ENCOUNTER — OFFICE VISIT (OUTPATIENT)
Dept: PSYCHIATRY | Facility: CLINIC | Age: 72
End: 2022-02-17
Payer: MEDICARE

## 2022-02-17 DIAGNOSIS — F41.9 ANXIETY: ICD-10-CM

## 2022-02-17 DIAGNOSIS — J30.1 NON-SEASONAL ALLERGIC RHINITIS DUE TO POLLEN: ICD-10-CM

## 2022-02-17 DIAGNOSIS — F33.1 MODERATE EPISODE OF RECURRENT MAJOR DEPRESSIVE DISORDER: Primary | ICD-10-CM

## 2022-02-17 PROCEDURE — 90837 PSYTX W PT 60 MINUTES: CPT | Mod: 95,,, | Performed by: SOCIAL WORKER

## 2022-02-17 PROCEDURE — 95115 IMMUNOTHERAPY ONE INJECTION: CPT | Mod: PBBFAC,PO

## 2022-02-17 PROCEDURE — 90837 PR PSYCHOTHERAPY W/PATIENT, 60 MIN: ICD-10-PCS | Mod: 95,,, | Performed by: SOCIAL WORKER

## 2022-02-17 PROCEDURE — 99499 UNLISTED E&M SERVICE: CPT | Mod: S$PBB,,, | Performed by: ALLERGY & IMMUNOLOGY

## 2022-02-17 PROCEDURE — 99499 NO LOS: ICD-10-PCS | Mod: S$PBB,,, | Performed by: ALLERGY & IMMUNOLOGY
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
## 2022-02-17 NOTE — PROGRESS NOTES
Pt received two Allergy shots, 0.1 ml of vial 1: 100 (blue) waited in clinic 30 min, no reactions Documented in XIS.

## 2022-02-24 ENCOUNTER — OFFICE VISIT (OUTPATIENT)
Dept: PSYCHIATRY | Facility: CLINIC | Age: 72
End: 2022-02-24
Payer: MEDICARE

## 2022-02-24 DIAGNOSIS — F41.9 ANXIETY: ICD-10-CM

## 2022-02-24 DIAGNOSIS — F33.1 MODERATE EPISODE OF RECURRENT MAJOR DEPRESSIVE DISORDER: Primary | ICD-10-CM

## 2022-02-24 DIAGNOSIS — F10.21 ALCOHOL USE DISORDER, MODERATE, IN SUSTAINED REMISSION: ICD-10-CM

## 2022-02-24 PROCEDURE — 90834 PR PSYCHOTHERAPY W/PATIENT, 45 MIN: ICD-10-PCS | Mod: S$PBB,,, | Performed by: SOCIAL WORKER

## 2022-02-24 PROCEDURE — 90834 PSYTX W PT 45 MINUTES: CPT | Mod: S$PBB,,, | Performed by: SOCIAL WORKER
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
## 2022-03-03 ENCOUNTER — OFFICE VISIT (OUTPATIENT)
Dept: PSYCHIATRY | Facility: CLINIC | Age: 72
End: 2022-03-03
Payer: MEDICARE

## 2022-03-03 ENCOUNTER — CLINICAL SUPPORT (OUTPATIENT)
Dept: ALLERGY | Facility: CLINIC | Age: 72
End: 2022-03-03
Payer: MEDICARE

## 2022-03-03 DIAGNOSIS — J30.1 NON-SEASONAL ALLERGIC RHINITIS DUE TO POLLEN: ICD-10-CM

## 2022-03-03 DIAGNOSIS — F33.1 MODERATE EPISODE OF RECURRENT MAJOR DEPRESSIVE DISORDER: Primary | ICD-10-CM

## 2022-03-03 DIAGNOSIS — F41.9 ANXIETY: ICD-10-CM

## 2022-03-03 DIAGNOSIS — F10.21 ALCOHOL USE DISORDER, MODERATE, IN SUSTAINED REMISSION: ICD-10-CM

## 2022-03-03 PROCEDURE — 95117 IMMUNOTHERAPY INJECTIONS: CPT | Mod: PBBFAC,PO

## 2022-03-03 PROCEDURE — 99499 NO LOS: ICD-10-PCS | Mod: S$PBB,,, | Performed by: ALLERGY & IMMUNOLOGY

## 2022-03-03 PROCEDURE — 99499 UNLISTED E&M SERVICE: CPT | Mod: S$PBB,,, | Performed by: ALLERGY & IMMUNOLOGY

## 2022-03-03 PROCEDURE — 90834 PR PSYCHOTHERAPY W/PATIENT, 45 MIN: ICD-10-PCS | Mod: 95,,, | Performed by: SOCIAL WORKER

## 2022-03-03 PROCEDURE — 90834 PSYTX W PT 45 MINUTES: CPT | Mod: 95,,, | Performed by: SOCIAL WORKER

## 2022-03-10 ENCOUNTER — OFFICE VISIT (OUTPATIENT)
Dept: PSYCHIATRY | Facility: CLINIC | Age: 72
End: 2022-03-10
Payer: MEDICARE

## 2022-03-10 ENCOUNTER — CLINICAL SUPPORT (OUTPATIENT)
Dept: ALLERGY | Facility: CLINIC | Age: 72
End: 2022-03-10
Payer: MEDICARE

## 2022-03-10 DIAGNOSIS — F41.9 ANXIETY: ICD-10-CM

## 2022-03-10 DIAGNOSIS — J30.1 NON-SEASONAL ALLERGIC RHINITIS DUE TO POLLEN: ICD-10-CM

## 2022-03-10 DIAGNOSIS — F10.21 ALCOHOL USE DISORDER, MODERATE, IN SUSTAINED REMISSION: ICD-10-CM

## 2022-03-10 DIAGNOSIS — F33.1 MODERATE EPISODE OF RECURRENT MAJOR DEPRESSIVE DISORDER: Primary | ICD-10-CM

## 2022-03-10 PROCEDURE — 99499 NO LOS: ICD-10-PCS | Mod: S$PBB,,, | Performed by: ALLERGY & IMMUNOLOGY

## 2022-03-10 PROCEDURE — 90837 PSYTX W PT 60 MINUTES: CPT | Mod: ,,, | Performed by: SOCIAL WORKER

## 2022-03-10 PROCEDURE — 99499 UNLISTED E&M SERVICE: CPT | Mod: S$PBB,,, | Performed by: ALLERGY & IMMUNOLOGY

## 2022-03-10 PROCEDURE — 95117 IMMUNOTHERAPY INJECTIONS: CPT | Mod: PBBFAC,PO

## 2022-03-10 PROCEDURE — 90837 PR PSYCHOTHERAPY W/PATIENT, 60 MIN: ICD-10-PCS | Mod: ,,, | Performed by: SOCIAL WORKER

## 2022-03-10 NOTE — PROGRESS NOTES
"    Individual Psychotherapy (PhD/LCSW)    Date: 3/10/2022       Site:  [facility] PSYCHIATRY  [facility] [place]     Therapeutic Intervention: Met with patient.  Outpatient -- CPT Code 91028    Chief complaint/reason for encounter: depression and anxiety     Interval history and content of current session: Reviewed chart.     Client processed some of his core beliefs regarding the world and why he has these core beliefs. Clt processed how he used to hitchhike when he was in college in order to promote the belief that his life is "significant" and has "purpose." Clt processed how he sometimes becomes disappointed when reflecting on his life bc did not have successful play on [employer], etc but then reframes looking at how many ways he has played a part in people's lives although does not have one major accomplishment to look at. Core beliefs client would like to work on in upcoming sessions include my life is significant/important, women don't enjoy sex, women are in charge(but avoidant in marriages), and stop the war.     Treatment plan:  · Target symptoms: depression, anxiety   · Why chosen therapy is appropriate versus another modality: relevant to diagnosis  · Outcome monitoring methods: self-report  · Therapeutic intervention type: insight oriented psychotherapy, supportive psychotherapy    Risk parameters:  Patient reports no suicidal ideation  Patient reports no homicidal ideation  Patient reports no self-injurious behavior  Patient reports no violent behavior    Verbal deficits: None    Patient's response to intervention:  The patient's response to intervention is accepting.    Progress toward goals and other mental status changes:  The patient's progress toward goals is good.    Diagnosis:   1. Moderate episode of recurrent major depressive disorder    2. Anxiety    3. Alcohol use disorder, moderate, in sustained remission        Plan:  individual psychotherapy    Return to " clinic: 1 week    Length of Service (minutes): 55 minutes

## 2022-03-10 NOTE — PROGRESS NOTES
Pt received two Allergy shots, 0.3 ml of vial 1: 100 (blue) waited in clinic 30 min, no reactions Documented in XIS.

## 2022-03-17 ENCOUNTER — OFFICE VISIT (OUTPATIENT)
Dept: PSYCHIATRY | Facility: CLINIC | Age: 72
End: 2022-03-17
Payer: MEDICARE

## 2022-03-17 ENCOUNTER — CLINICAL SUPPORT (OUTPATIENT)
Dept: ALLERGY | Facility: CLINIC | Age: 72
End: 2022-03-17
Payer: MEDICARE

## 2022-03-17 DIAGNOSIS — J30.9 CHRONIC ALLERGIC RHINITIS: Primary | ICD-10-CM

## 2022-03-17 DIAGNOSIS — F10.21 ALCOHOL USE DISORDER, MODERATE, IN SUSTAINED REMISSION: ICD-10-CM

## 2022-03-17 DIAGNOSIS — F33.1 MODERATE EPISODE OF RECURRENT MAJOR DEPRESSIVE DISORDER: Primary | ICD-10-CM

## 2022-03-17 DIAGNOSIS — F41.9 ANXIETY: ICD-10-CM

## 2022-03-17 PROCEDURE — 90785 PR INTERACTIVE COMPLEXITY: ICD-10-PCS | Mod: ,,, | Performed by: SOCIAL WORKER

## 2022-03-17 PROCEDURE — 90785 PSYTX COMPLEX INTERACTIVE: CPT | Mod: ,,, | Performed by: SOCIAL WORKER

## 2022-03-17 PROCEDURE — 99211 OFF/OP EST MAY X REQ PHY/QHP: CPT | Mod: S$PBB,,, | Performed by: ALLERGY & IMMUNOLOGY

## 2022-03-17 PROCEDURE — 99211 PR OFFICE/OUTPT VISIT, EST, LEVL I: ICD-10-PCS | Mod: S$PBB,,, | Performed by: ALLERGY & IMMUNOLOGY

## 2022-03-17 PROCEDURE — 90834 PSYTX W PT 45 MINUTES: CPT | Mod: ,,, | Performed by: SOCIAL WORKER

## 2022-03-17 PROCEDURE — 95117 IMMUNOTHERAPY INJECTIONS: CPT | Mod: PBBFAC,PO

## 2022-03-17 PROCEDURE — 90834 PR PSYCHOTHERAPY W/PATIENT, 45 MIN: ICD-10-PCS | Mod: ,,, | Performed by: SOCIAL WORKER

## 2022-03-17 NOTE — PROGRESS NOTES
"    Individual Psychotherapy (PhD/LCSW)    Date: 3/17/2022       Site:  [facility] PS[facility]IAT[facility]  [facility] [place]     Therapeutic Intervention: Met with patient.  Outpatient -- CPT Code 41911    Chief complaint/reason for encounter: depression and anxiety     Interval history and content of current session: Reviewed chart.     Client processed how he is feeling like everything is "futile" today. Clt processed how he has been having insomnia past few nights due to coughing. Clt notes he is not eating gluten now due to noticing that increases his cough. Therapist did EMDR regarding client's feeling like things are "futile." Clt processd how he tends to go to "guilt" as a way to motivate himself. Therapist encouraged clt to write down list of his morals/values to refer back to when feeling guilty. Clt also processed how his feeling down may be related to [employer] Day and how he used to associate it with drinking alcohol.     Treatment plan:  · Target symptoms: depression, anxiety   · Why chosen therapy is appropriate versus another modality: relevant to diagnosis  · Outcome monitoring methods: self-report  · Therapeutic intervention type: insight oriented psychotherapy, supportive psychotherapy    Risk parameters:  Patient reports no suicidal ideation  Patient reports no homicidal ideation  Patient reports no self-injurious behavior  Patient reports no violent behavior    Verbal deficits: None    Patient's response to intervention:  The patient's response to intervention is accepting.    Progress toward goals and other mental status changes:  The patient's progress toward goals is good.    Diagnosis:   1. Moderate episode of recurrent major depressive disorder    2. Anxiety    3. Alcohol use disorder, moderate, in sustained remission        Plan:  individual psychotherapy    Return to clinic: 1 week    Length of Service (minutes): 50 minutes  "

## 2022-03-17 NOTE — PROGRESS NOTES
.No issues with last injections  No new meds or eye drops  No wheezing or inhaler use for 48 hours     Vial #3    set 1 of  2   BLUE 1:100 C/APD/W. Expiration 10/1/89575. Administered 0.4ml in upper left arm.       Vial #3    set 2 of  2  BLUE 1:100 DM\GR\TR. Expiration 10/29/2022. Administered 0.4ml in upper right arm.       Tolerated well   Pt instructed to remain in clinic for 30 minutes to monitor for rxn  Verbalized understanding

## 2022-03-24 ENCOUNTER — CLINICAL SUPPORT (OUTPATIENT)
Dept: ALLERGY | Facility: CLINIC | Age: 72
End: 2022-03-24
Payer: MEDICARE

## 2022-03-24 ENCOUNTER — PATIENT OUTREACH (OUTPATIENT)
Dept: ADMINISTRATIVE | Facility: OTHER | Age: 72
End: 2022-03-24
Payer: MEDICARE

## 2022-03-24 ENCOUNTER — OFFICE VISIT (OUTPATIENT)
Dept: PSYCHIATRY | Facility: CLINIC | Age: 72
End: 2022-03-24
Payer: MEDICARE

## 2022-03-24 DIAGNOSIS — F10.21 ALCOHOL USE DISORDER, MODERATE, IN SUSTAINED REMISSION: ICD-10-CM

## 2022-03-24 DIAGNOSIS — F33.1 MODERATE EPISODE OF RECURRENT MAJOR DEPRESSIVE DISORDER: Primary | ICD-10-CM

## 2022-03-24 DIAGNOSIS — J30.9 CHRONIC ALLERGIC RHINITIS: Primary | ICD-10-CM

## 2022-03-24 DIAGNOSIS — F41.9 ANXIETY: ICD-10-CM

## 2022-03-24 PROCEDURE — 99499 UNLISTED E&M SERVICE: CPT | Mod: S$PBB,,, | Performed by: ALLERGY & IMMUNOLOGY

## 2022-03-24 PROCEDURE — 99499 NO LOS: ICD-10-PCS | Mod: S$PBB,,, | Performed by: ALLERGY & IMMUNOLOGY

## 2022-03-24 PROCEDURE — 99999 PR PBB SHADOW E&M-EST. PATIENT-LVL I: ICD-10-PCS | Mod: PBBFAC,,,

## 2022-03-24 PROCEDURE — 90837 PR PSYCHOTHERAPY W/PATIENT, 60 MIN: ICD-10-PCS | Mod: ,,, | Performed by: SOCIAL WORKER

## 2022-03-24 PROCEDURE — 95117 IMMUNOTHERAPY INJECTIONS: CPT | Mod: PBBFAC,PO

## 2022-03-24 PROCEDURE — 99999 PR PBB SHADOW E&M-EST. PATIENT-LVL I: CPT | Mod: PBBFAC,,,

## 2022-03-24 PROCEDURE — 90837 PSYTX W PT 60 MINUTES: CPT | Mod: ,,, | Performed by: SOCIAL WORKER

## 2022-03-24 NOTE — PROGRESS NOTES
"    Individual Psychotherapy (PhD/LCSW)    Date: 3/24/2022       Site:  [facility]  [facility], [place]     Therapeutic Intervention: Met with patient.  Outpatient -- CPT Code 85457    Chief complaint/reason for encounter: depression and anxiety     Interval history and content of current session: Reviewed chart.     Client processed with therapist a list of his priorities from when he was at [facility] vs his priorities now. Clt then processed his core beliefs with therapist, such as most people being good as well as not harming others. Clt's priorities and values included breathing, his dog, relationships with others, theatre, writing and relationship with God. Clt processed how he has let go of feeling like he "should" value relationship with brother and accepts that he is not his priority. Clt planning to visit [place] to see play that he did over weekend.     Treatment plan:  · Target symptoms: depression, anxiety   · Why chosen therapy is appropriate versus another modality: relevant to diagnosis  · Outcome monitoring methods: self-report  · Therapeutic intervention type: insight oriented psychotherapy, supportive psychotherapy    Risk parameters:  Patient reports no suicidal ideation  Patient reports no homicidal ideation  Patient reports no self-injurious behavior  Patient reports no violent behavior    Verbal deficits: None    Patient's response to intervention:  The patient's response to intervention is accepting.    Progress toward goals and other mental status changes:  The patient's progress toward goals is good.    Diagnosis:   1. Moderate episode of recurrent major depressive disorder    2. Anxiety    3. Alcohol use disorder, moderate, in sustained remission        Plan:  individual psychotherapy    Return to clinic: 1 week    Length of Service (minutes): 50 minutes  "

## 2022-03-24 NOTE — PROGRESS NOTES
No issues with last injections  No new meds or eye drops  No wheezing or inhaler use for 48 hours     Vial #3    set 1 of  2   BLUE 1:100 C/APD/W. Expiration 10/1/09462. Administered 0.5ml in upper left arm.       Vial #3    set 2 of  2  BLUE 1:100 DM\GR\TR. Expiration 10/29/2022. Administered 0.5ml in upper right arm.       Tolerated well   Pt instructed to remain in clinic for 30 minutes to monitor for rxn  Verbalized understanding

## 2022-03-24 NOTE — PROGRESS NOTES
No rxn noted; pt released at this time.  Next allergy injection appointment scheduled per pt preferences

## 2022-03-31 ENCOUNTER — OFFICE VISIT (OUTPATIENT)
Dept: PSYCHIATRY | Facility: CLINIC | Age: 72
End: 2022-03-31
Payer: MEDICARE

## 2022-03-31 DIAGNOSIS — F33.1 MODERATE EPISODE OF RECURRENT MAJOR DEPRESSIVE DISORDER: Primary | ICD-10-CM

## 2022-03-31 DIAGNOSIS — F10.21 ALCOHOL USE DISORDER, MODERATE, IN SUSTAINED REMISSION: ICD-10-CM

## 2022-03-31 DIAGNOSIS — F41.9 ANXIETY: ICD-10-CM

## 2022-03-31 PROCEDURE — 90837 PR PSYCHOTHERAPY W/PATIENT, 60 MIN: ICD-10-PCS | Mod: ,,, | Performed by: SOCIAL WORKER

## 2022-03-31 PROCEDURE — 90837 PSYTX W PT 60 MINUTES: CPT | Mod: ,,, | Performed by: SOCIAL WORKER

## 2022-03-31 NOTE — PROGRESS NOTES
Individual Psychotherapy (PhD/LCSW)    Date: 3/31/2022       Site:  [facility]  [facility], [place]     Therapeutic Intervention: Met with patient.  Outpatient -- CPT Code 17749    Chief complaint/reason for encounter: depression and anxiety     Interval history and content of current session: Reviewed chart.     Client processed how he noticed that he acted well when went to [place] regarding seeing the play he was in. Clt processed how he read book regarding parts of oneself, angry part and people pleasing part, which he related to. Clt processed how he is feeling frustrated regarding his cough and debating whether to continue shots, but likely will. Clt processed how he is to begin playwrighting workshop tomorrow and concerned bc does not have a play in mind to write and also feeling less motivated lately. Therapist encouraged clt to look into feelings wheel to see which other things could motivate him besides anger or romance as well as just the feeling of accomplishment at end.     Treatment plan:  · Target symptoms: depression, anxiety   · Why chosen therapy is appropriate versus another modality: relevant to diagnosis  · Outcome monitoring methods: self-report  · Therapeutic intervention type: insight oriented psychotherapy, supportive psychotherapy    Risk parameters:  Patient reports no suicidal ideation  Patient reports no homicidal ideation  Patient reports no self-injurious behavior  Patient reports no violent behavior    Verbal deficits: None    Patient's response to intervention:  The patient's response to intervention is accepting.    Progress toward goals and other mental status changes:  The patient's progress toward goals is good.    Diagnosis:   1. Moderate episode of recurrent major depressive disorder    2. Anxiety    3. Alcohol use disorder, moderate, in sustained remission        Plan:  individual psychotherapy    Return to clinic: 1  week    Length of Service (minutes): 55 minutes

## 2022-04-07 ENCOUNTER — CLINICAL SUPPORT (OUTPATIENT)
Dept: ALLERGY | Facility: CLINIC | Age: 72
End: 2022-04-07
Payer: MEDICARE

## 2022-04-07 DIAGNOSIS — J30.1 NON-SEASONAL ALLERGIC RHINITIS DUE TO POLLEN: ICD-10-CM

## 2022-04-07 PROCEDURE — 99499 UNLISTED E&M SERVICE: CPT | Mod: S$PBB,,, | Performed by: ALLERGY & IMMUNOLOGY

## 2022-04-07 PROCEDURE — 99499 NO LOS: ICD-10-PCS | Mod: S$PBB,,, | Performed by: ALLERGY & IMMUNOLOGY

## 2022-04-07 PROCEDURE — 95117 IMMUNOTHERAPY INJECTIONS: CPT | Mod: PBBFAC,PO

## 2022-04-07 NOTE — PROGRESS NOTES
Pt received two Allergy shots, 0.1 ml of vial 1: 10 (yellow) waited in clinic 30 min, no reactions Documented in XIS.

## 2022-04-14 ENCOUNTER — CLINICAL SUPPORT (OUTPATIENT)
Dept: ALLERGY | Facility: CLINIC | Age: 72
End: 2022-04-14
Payer: MEDICARE

## 2022-04-14 ENCOUNTER — OFFICE VISIT (OUTPATIENT)
Dept: CARDIOLOGY | Facility: CLINIC | Age: 72
End: 2022-04-14
Payer: MEDICARE

## 2022-04-14 VITALS
HEART RATE: 88 BPM | BODY MASS INDEX: 28.37 KG/M2 | DIASTOLIC BLOOD PRESSURE: 91 MMHG | WEIGHT: 209.44 LBS | SYSTOLIC BLOOD PRESSURE: 151 MMHG | HEIGHT: 72 IN

## 2022-04-14 DIAGNOSIS — J30.9 CHRONIC ALLERGIC RHINITIS: Primary | ICD-10-CM

## 2022-04-14 DIAGNOSIS — I10 PRIMARY HYPERTENSION: ICD-10-CM

## 2022-04-14 DIAGNOSIS — E78.2 MIXED HYPERLIPIDEMIA: Primary | ICD-10-CM

## 2022-04-14 PROCEDURE — 95117 IMMUNOTHERAPY INJECTIONS: CPT | Mod: PBBFAC,PO

## 2022-04-14 PROCEDURE — 99999 PR PBB SHADOW E&M-EST. PATIENT-LVL III: CPT | Mod: PBBFAC,,, | Performed by: INTERNAL MEDICINE

## 2022-04-14 PROCEDURE — 99499 UNLISTED E&M SERVICE: CPT | Mod: S$PBB,,, | Performed by: ALLERGY & IMMUNOLOGY

## 2022-04-14 PROCEDURE — 99213 OFFICE O/P EST LOW 20 MIN: CPT | Mod: S$PBB,,, | Performed by: INTERNAL MEDICINE

## 2022-04-14 PROCEDURE — 99213 OFFICE O/P EST LOW 20 MIN: CPT | Mod: PBBFAC,PO,25 | Performed by: INTERNAL MEDICINE

## 2022-04-14 PROCEDURE — 99499 NO LOS: ICD-10-PCS | Mod: S$PBB,,, | Performed by: ALLERGY & IMMUNOLOGY

## 2022-04-14 PROCEDURE — 99999 PR PBB SHADOW E&M-EST. PATIENT-LVL III: ICD-10-PCS | Mod: PBBFAC,,, | Performed by: INTERNAL MEDICINE

## 2022-04-14 PROCEDURE — 99213 PR OFFICE/OUTPT VISIT, EST, LEVL III, 20-29 MIN: ICD-10-PCS | Mod: S$PBB,,, | Performed by: INTERNAL MEDICINE

## 2022-04-14 NOTE — PROGRESS NOTES
No issues with last injections  No new meds or eye drops  No wheezing or inhaler use for 48 hours     Vial #2    set 1 of  2   YELLOW 1:10 C/APD/W. Expiration 10/1/2022. Administered 0.15ml in upper left arm.       Vial #2   set 2 of  2  YELLOW 1:10 DM\GR\TR. Expiration 10/1/2022. Administered 0.15ml in upper right arm.       Tolerated well   Pt instructed to remain in clinic for 30 minutes to monitor for rxn  Verbalized understanding

## 2022-04-14 NOTE — PROGRESS NOTES
Subjective:    Patient ID:  Chris Quiroz is a 71 y.o. male who presents for follow-up of Hypertension      HPI  He comes for follow up with no major problems, no chest pain, no shortness of breath.  Blood pressure normal at home.  Not taking any cardiac medicines ordered a statin    Review of Systems   Constitutional: Negative for decreased appetite, malaise/fatigue, weight gain and weight loss.   Cardiovascular: Negative for chest pain, dyspnea on exertion, leg swelling, palpitations and syncope.   Respiratory: Negative for cough and shortness of breath.    Gastrointestinal: Negative.    Neurological: Negative for weakness.   All other systems reviewed and are negative.       Objective:      Physical Exam  Vitals and nursing note reviewed.   Constitutional:       Appearance: Normal appearance. He is well-developed.   HENT:      Head: Normocephalic.   Eyes:      Pupils: Pupils are equal, round, and reactive to light.   Neck:      Thyroid: No thyromegaly.      Vascular: No carotid bruit or JVD.   Cardiovascular:      Rate and Rhythm: Normal rate and regular rhythm.      Chest Wall: PMI is not displaced.      Pulses: Normal pulses and intact distal pulses.      Heart sounds: Normal heart sounds. No murmur heard.    No gallop.   Pulmonary:      Effort: Pulmonary effort is normal.      Breath sounds: Normal breath sounds.   Abdominal:      Palpations: Abdomen is soft. There is no mass.      Tenderness: There is no abdominal tenderness.   Musculoskeletal:         General: Normal range of motion.      Cervical back: Normal range of motion and neck supple.   Skin:     General: Skin is warm.   Neurological:      Mental Status: He is alert and oriented to person, place, and time.      Sensory: No sensory deficit.      Deep Tendon Reflexes: Reflexes are normal and symmetric.           Assessment:       1. Mixed hyperlipidemia    2. Primary hypertension         Plan:     Continue all cardiac medications  Regular  exercise program  Weight loss  Follow-up as needed

## 2022-04-19 ENCOUNTER — CLINICAL SUPPORT (OUTPATIENT)
Dept: ALLERGY | Facility: CLINIC | Age: 72
End: 2022-04-19
Payer: MEDICARE

## 2022-04-19 DIAGNOSIS — J30.9 CHRONIC ALLERGIC RHINITIS: Primary | ICD-10-CM

## 2022-04-19 PROCEDURE — 95117 IMMUNOTHERAPY INJECTIONS: CPT | Mod: PBBFAC,PO

## 2022-04-19 PROCEDURE — 99499 NO LOS: ICD-10-PCS | Mod: S$PBB,,, | Performed by: ALLERGY & IMMUNOLOGY

## 2022-04-19 PROCEDURE — 99999 PR PBB SHADOW E&M-EST. PATIENT-LVL I: CPT | Mod: PBBFAC,,,

## 2022-04-19 PROCEDURE — 99999 PR PBB SHADOW E&M-EST. PATIENT-LVL I: ICD-10-PCS | Mod: PBBFAC,,,

## 2022-04-19 PROCEDURE — 99499 UNLISTED E&M SERVICE: CPT | Mod: S$PBB,,, | Performed by: ALLERGY & IMMUNOLOGY

## 2022-04-19 NOTE — PROGRESS NOTES
No issues with last injections  No new meds or eye drops  No wheezing or inhaler use for 48 hours     Vial #2    set 1 of  2   YELLOW 1:10 C/APD/W. Expiration 10/1/2022. Administered 0.2ml in upper left arm.       Vial #2   set 2 of  2  YELLOW 1:10 DM\GR\TR. Expiration 10/1/2022. Administered 0.2ml in upper right arm.       Tolerated well   Pt instructed to remain in clinic for 30 minutes to monitor for rxn  Verbalized understanding

## 2022-04-19 NOTE — PROGRESS NOTES
No rxn noted; pt released at this time.  Next allergy injection appointment scheduled per pt preferences            (1) Present in one limb

## 2022-04-21 ENCOUNTER — CLINICAL SUPPORT (OUTPATIENT)
Dept: ALLERGY | Facility: CLINIC | Age: 72
End: 2022-04-21
Payer: MEDICARE

## 2022-04-21 DIAGNOSIS — J30.9 CHRONIC ALLERGIC RHINITIS: Primary | ICD-10-CM

## 2022-04-21 PROCEDURE — 99499 NO LOS: ICD-10-PCS | Mod: S$PBB,,, | Performed by: ALLERGY & IMMUNOLOGY

## 2022-04-21 PROCEDURE — 99499 UNLISTED E&M SERVICE: CPT | Mod: S$PBB,,, | Performed by: ALLERGY & IMMUNOLOGY

## 2022-04-21 PROCEDURE — 95117 IMMUNOTHERAPY INJECTIONS: CPT | Mod: PBBFAC,PO

## 2022-04-21 NOTE — PROGRESS NOTES
No issues with last injections  No new meds or eye drops  No wheezing or inhaler use for 48 hours     Vial #2    set 1 of  2   YELLOW 1:10 C/APD/W. Expiration 10/1/2022. Administered 0.25ml in upper left arm.       Vial #2   set 2 of  2  YELLOW 1:10 DM\GR\TR. Expiration 10/1/2022. Administered 0.25ml in upper right arm.       Tolerated well   Pt instructed to remain in clinic for 30 minutes to monitor for rxn  Verbalized understanding

## 2022-05-04 ENCOUNTER — OFFICE VISIT (OUTPATIENT)
Dept: FAMILY MEDICINE | Facility: CLINIC | Age: 72
End: 2022-05-04
Payer: MEDICARE

## 2022-05-04 ENCOUNTER — PATIENT OUTREACH (OUTPATIENT)
Dept: ADMINISTRATIVE | Facility: OTHER | Age: 72
End: 2022-05-04
Payer: MEDICARE

## 2022-05-04 VITALS
TEMPERATURE: 98 F | OXYGEN SATURATION: 95 % | BODY MASS INDEX: 28.33 KG/M2 | HEART RATE: 92 BPM | WEIGHT: 209.19 LBS | RESPIRATION RATE: 18 BRPM | HEIGHT: 72 IN | DIASTOLIC BLOOD PRESSURE: 76 MMHG | SYSTOLIC BLOOD PRESSURE: 124 MMHG

## 2022-05-04 DIAGNOSIS — F33.1 MODERATE EPISODE OF RECURRENT MAJOR DEPRESSIVE DISORDER: ICD-10-CM

## 2022-05-04 DIAGNOSIS — E78.2 MIXED HYPERLIPIDEMIA: Primary | ICD-10-CM

## 2022-05-04 DIAGNOSIS — R05.3 CHRONIC COUGH: ICD-10-CM

## 2022-05-04 DIAGNOSIS — Z12.5 PROSTATE CANCER SCREENING: ICD-10-CM

## 2022-05-04 DIAGNOSIS — I70.0 THORACIC AORTA ATHEROSCLEROSIS: ICD-10-CM

## 2022-05-04 DIAGNOSIS — R06.2 WHEEZING: ICD-10-CM

## 2022-05-04 DIAGNOSIS — J44.9 CHRONIC OBSTRUCTIVE PULMONARY DISEASE, UNSPECIFIED COPD TYPE: ICD-10-CM

## 2022-05-04 PROBLEM — Z87.11 HISTORY OF PEPTIC ULCER: Status: ACTIVE | Noted: 2020-02-20

## 2022-05-04 PROBLEM — J45.909 ASTHMA: Status: ACTIVE | Noted: 2020-02-20

## 2022-05-04 PROCEDURE — 80053 COMPREHEN METABOLIC PANEL: CPT | Performed by: NURSE PRACTITIONER

## 2022-05-04 PROCEDURE — 99214 OFFICE O/P EST MOD 30 MIN: CPT | Mod: 25,S$GLB,, | Performed by: NURSE PRACTITIONER

## 2022-05-04 PROCEDURE — 96372 THER/PROPH/DIAG INJ SC/IM: CPT | Mod: S$GLB,,, | Performed by: NURSE PRACTITIONER

## 2022-05-04 PROCEDURE — 99214 PR OFFICE/OUTPT VISIT, EST, LEVL IV, 30-39 MIN: ICD-10-PCS | Mod: 25,S$GLB,, | Performed by: NURSE PRACTITIONER

## 2022-05-04 PROCEDURE — 84153 ASSAY OF PSA TOTAL: CPT | Performed by: NURSE PRACTITIONER

## 2022-05-04 PROCEDURE — 36415 PR COLLECTION VENOUS BLOOD,VENIPUNCTURE: ICD-10-PCS | Mod: S$GLB,,, | Performed by: NURSE PRACTITIONER

## 2022-05-04 PROCEDURE — 36415 COLL VENOUS BLD VENIPUNCTURE: CPT | Mod: S$GLB,,, | Performed by: NURSE PRACTITIONER

## 2022-05-04 PROCEDURE — 96372 PR INJECTION,THERAP/PROPH/DIAG2ST, IM OR SUBCUT: ICD-10-PCS | Mod: S$GLB,,, | Performed by: NURSE PRACTITIONER

## 2022-05-04 RX ORDER — DEXAMETHASONE SODIUM PHOSPHATE 4 MG/ML
8 INJECTION, SOLUTION INTRA-ARTICULAR; INTRALESIONAL; INTRAMUSCULAR; INTRAVENOUS; SOFT TISSUE
Status: COMPLETED | OUTPATIENT
Start: 2022-05-04 | End: 2022-05-04

## 2022-05-04 RX ORDER — DIPHENHYDRAMINE HCL 25 MG
25 CAPSULE ORAL NIGHTLY PRN
Status: ON HOLD | COMMUNITY
End: 2023-01-01 | Stop reason: HOSPADM

## 2022-05-04 RX ORDER — ROSUVASTATIN CALCIUM 10 MG/1
10 TABLET, COATED ORAL DAILY
Qty: 90 TABLET | Refills: 3 | Status: SHIPPED | OUTPATIENT
Start: 2022-05-04 | End: 2023-01-01

## 2022-05-04 RX ADMIN — DEXAMETHASONE SODIUM PHOSPHATE 8 MG: 4 INJECTION, SOLUTION INTRA-ARTICULAR; INTRALESIONAL; INTRAMUSCULAR; INTRAVENOUS; SOFT TISSUE at 10:05

## 2022-05-04 NOTE — PROGRESS NOTES
Venipuncture performed with 23 gauge butterfly, x's 1 attempt,  to L Antecubital vein. Unable to obtain specimens.      Pressure dressing applied to site, instructed patient to remove dressing in 10-15 minutes, OK to re-adjust dressing if pressure causing any discomfort, to observe closely for numbness and/or discoloration to hand or fingers, and to notify provider if bleeding persists after applying constant pressure lasting 30 minutes.       Venipuncture performed with 23 gauge butterfly, x's 1 attempt,  to R Antecubital vein.  Specimens collected per orders.      Pressure dressing applied to site, instructed patient to remove dressing in 10-15 minutes, OK to re-adjust dressing if pressure causing any discomfort, to observe closely for numbness and/or discoloration to hand or fingers, and to notify provider if bleeding persists after applying constant pressure lasting 30 minutes.

## 2022-05-04 NOTE — PROGRESS NOTES
"Subjective:       Patient ID: Chris Quiroz is a 71 y.o. male.    Chief Complaint: Follow-up    HPI here for follow up. States he is having a terrible time with his allergies. Lots of mucous, cough. He has seen allergist many times. He has done allergy shots that he did not feel helped. He takes allergy medications daily. He states wheezing at night off and on. No fever. He uses his inhaler as needed.     Due for labs. He has not taken his cholesterol medication in about 3 months. States when the refills ran out he just didn't refill it. Will send that in today. He denies any other concerns. See ROS.    The following portion of the patients history was reviewed and updated as appropriate: allergies, current medications, past medical and surgical history. Past social history and problem list reviewed. Family PMH and Past social history reviewed. Tobacco, Illicit drug use reviewed.      Review of patient's allergies indicates:   Allergen Reactions    Dog dander Shortness Of Breath     Pt states " my lungs fill up and I cough"    Juniper Shortness Of Breath     States lungs fill up with fluid    Ragweed pollen      Other reaction(s): Cough    Nsaids (non-steroidal anti-inflammatory drug) Other (See Comments)     Gastric ulcers    Cat dander          Current Outpatient Medications:     acetaminophen (TYLENOL) 325 MG tablet, Take 2 tablets (650 mg total) by mouth every 6 (six) hours as needed for Pain., Disp: 60 tablet, Rfl: 0    albuterol (VENTOLIN HFA) 90 mcg/actuation inhaler, Inhale 2 puffs into the lungs every 6 (six) hours as needed for Wheezing. Rescue, Disp: 18 g, Rfl: 1    diphenhydrAMINE (BENADRYL) 25 mg capsule, Take 25 mg by mouth 2 (two) times daily as needed., Disp: , Rfl:     esomeprazole (NEXIUM) 40 MG capsule, Take 1 capsule by mouth once daily., Disp: , Rfl:     glucosamine-chondroitin 500-400 mg tablet, Take 1 tablet by mouth as needed. , Disp: , Rfl:     melatonin 5 mg Tab, Take 2 " mg by mouth nightly. , Disp: , Rfl:     montelukast (SINGULAIR) 10 mg tablet, TAKE 1 TABLET BY MOUTH EVERY DAY IN THE EVENING, Disp: 30 tablet, Rfl: 6    vortioxetine (TRINTELLIX) 10 mg Tab, Take 1 tablet (10 mg total) by mouth once daily., Disp: 90 tablet, Rfl: 3    rosuvastatin (CRESTOR) 10 MG tablet, Take 1 tablet (10 mg total) by mouth once daily. (Patient not taking: Reported on 5/4/2022), Disp: 90 tablet, Rfl: 3    Past Medical History:   Diagnosis Date    Anxiety     Anxiety     Atherosclerosis of coronary artery     per CT scan dated 7/13/18    Depression     Diverticulosis of intestine without bleeding     GERD (gastroesophageal reflux disease)     HTN (hypertension)     Hyperlipidemia     Hypertension     Plantar fasciitis     Thoracic aorta atherosclerosis     per CT dated 7/13/18. sent for scanning    Vitamin D deficiency     Vitamin D deficiency        Past Surgical History:   Procedure Laterality Date    COLONOSCOPY      ESOPHAGOGASTRODUODENOSCOPY Left 12/7/2018    Procedure: EGD (ESOPHAGOGASTRODUODENOSCOPY);  Surgeon: Josiah Cuadra MD;  Location: [facility];  Service: Endoscopy;  Laterality: Left;    ESOPHAGOGASTRODUODENOSCOPY N/A 2/28/2021    Procedure: EGD (ESOPHAGOGASTRODUODENOSCOPY);  Surgeon: James Martines Jr., MD;  Location: [facility];  Service: Endoscopy;  Laterality: N/A;    Right Hand Surgery      TONSILLECTOMY      UMBILICAL HERNIA REPAIR N/A 5/19/2021    Procedure: REPAIR, HERNIA, UMBILICAL WITH MESH;  Surgeon: Hardik Ruiz MD;  Location: [facility];  Service: General;  Laterality: N/A;       Social History     Socioeconomic History    Marital status:    Tobacco Use    Smoking status: Former Smoker     Packs/day: 2.00    Smokeless tobacco: Never Used    Tobacco comment: Age Started: 22 Age Quit: 51   Substance and Sexual Activity    Alcohol use: Not Currently     Comment: once in a while    Drug use: No     Comment: Tried  CBD     Sexual  activity: Never         Review of Systems   Constitutional: Negative for activity change, appetite change, fatigue and fever.   HENT: Positive for postnasal drip, rhinorrhea and sneezing. Negative for congestion, sinus pressure, sinus pain, sore throat and trouble swallowing.    Eyes: Negative for visual disturbance.   Respiratory: Positive for cough and wheezing. Negative for chest tightness and shortness of breath.    Cardiovascular: Negative for chest pain, palpitations and leg swelling.   Gastrointestinal: Negative for abdominal pain, blood in stool, diarrhea, nausea and vomiting.   Genitourinary: Negative for difficulty urinating.   Musculoskeletal: Negative for arthralgias, back pain and gait problem.   Skin: Negative for rash.   Neurological: Negative for dizziness, weakness and headaches.   Hematological: Negative for adenopathy. Does not bruise/bleed easily.   Psychiatric/Behavioral: Negative for decreased concentration, dysphoric mood and sleep disturbance (due to cough). The patient is not nervous/anxious.        Objective:      /76   Pulse 92   Temp 98.2 °F (36.8 °C) (Temporal)   Resp 18   Ht 6' (1.829 m)   Wt 94.9 kg (209 lb 3.5 oz)   SpO2 95%   BMI 28.37 kg/m²      Physical Exam  Vitals and nursing note reviewed.   Constitutional:       General: He is not in acute distress.     Appearance: He is well-developed. He is obese. He is not diaphoretic.   HENT:      Head: Normocephalic and atraumatic.      Right Ear: Tympanic membrane, ear canal and external ear normal.      Left Ear: Tympanic membrane, ear canal and external ear normal.      Nose: Rhinorrhea present.      Mouth/Throat:      Mouth: Mucous membranes are moist.      Pharynx: Oropharynx is clear. No oropharyngeal exudate.   Eyes:      General:         Right eye: No discharge.         Left eye: No discharge.      Conjunctiva/sclera: Conjunctivae normal.      Pupils: Pupils are equal, round, and reactive to light.   Neck:       Thyroid: No thyromegaly.      Vascular: No carotid bruit or JVD.   Cardiovascular:      Rate and Rhythm: Normal rate and regular rhythm.      Pulses:           Carotid pulses are 2+ on the right side and 2+ on the left side.       Radial pulses are 2+ on the right side and 2+ on the left side.      Heart sounds: Normal heart sounds. No murmur heard.    No gallop.   Pulmonary:      Effort: Pulmonary effort is normal. No respiratory distress.      Breath sounds: Wheezing present. No rales.      Comments: Wheezing, congestion bilateral lung fields.  Chest:      Chest wall: No tenderness.   Abdominal:      General: Bowel sounds are normal. There is no distension.      Palpations: Abdomen is soft.      Tenderness: There is no abdominal tenderness. There is no guarding or rebound.   Musculoskeletal:         General: Normal range of motion.      Cervical back: Full passive range of motion without pain, normal range of motion and neck supple.      Right lower leg: No edema.      Left lower leg: No edema.      Comments: Gait and coordination normal.  strong, equal. Upper and lower extremity strength normal.    Lymphadenopathy:      Cervical: No cervical adenopathy.   Skin:     General: Skin is warm and dry.      Capillary Refill: Capillary refill takes less than 2 seconds.      Findings: No rash.   Neurological:      General: No focal deficit present.      Mental Status: He is alert and oriented to person, place, and time.   Psychiatric:         Attention and Perception: Attention and perception normal.         Mood and Affect: Mood and affect normal.         Speech: Speech normal.         Behavior: Behavior normal.         Assessment:       1. Mixed hyperlipidemia    2. Chronic cough    3. Wheezing    4. Moderate episode of recurrent major depressive disorder    5. Thoracic aorta atherosclerosis    6. Chronic obstructive pulmonary disease, unspecified COPD type    7. Prostate cancer screening        Plan:       Mixed  hyperlipidemia: due for labs. Restart on Crestor.    Chronic cough: will get CXR.   -     X-Ray Chest PA And Lateral; Future; Expected date: 05/04/2022    Wheezing: continue inhaler.   -     dexamethasone injection 8 mg:  Risks and benefits discussed. Potential side effects such as anxiety, palpitations and flushing discussed. May increase blood glucose levels over the next 48 hours. Potential for skin atrophy at injection site. Tolerated injection well.    Moderate episode of recurrent major depressive disorder: stable on Trintellix. Followed by Psychiatry    Thoracic aorta atherosclerosis: important to keep cholesterol under good control.     Chronic obstructive pulmonary disease, unspecified COPD type: use inhaler.   -     Comprehensive Metabolic Panel    Prostate cancer screening  -     PSA, Screening    Other orders  -     rosuvastatin (CRESTOR) 10 MG tablet; Take 1 tablet (10 mg total) by mouth once daily.  Dispense: 90 tablet; Refill: 3       Continue current medication  Take medications only as prescribed  Healthy diet, exercise  Adequate rest  Adequate hydration  Avoid allergens  Avoid excessive caffeine     follow up if not improving

## 2022-05-05 ENCOUNTER — PATIENT MESSAGE (OUTPATIENT)
Dept: FAMILY MEDICINE | Facility: CLINIC | Age: 72
End: 2022-05-05
Payer: MEDICARE

## 2022-05-05 ENCOUNTER — PATIENT MESSAGE (OUTPATIENT)
Dept: ALLERGY | Facility: CLINIC | Age: 72
End: 2022-05-05

## 2022-05-05 ENCOUNTER — HOSPITAL ENCOUNTER (OUTPATIENT)
Dept: RADIOLOGY | Facility: HOSPITAL | Age: 72
Discharge: HOME | End: 2022-05-05
Attending: NURSE PRACTITIONER
Payer: MEDICARE

## 2022-05-05 ENCOUNTER — CLINICAL SUPPORT (OUTPATIENT)
Dept: ALLERGY | Facility: CLINIC | Age: 72
End: 2022-05-05
Payer: MEDICARE

## 2022-05-05 DIAGNOSIS — R05.3 CHRONIC COUGH: ICD-10-CM

## 2022-05-05 DIAGNOSIS — J30.9 CHRONIC ALLERGIC RHINITIS: Primary | ICD-10-CM

## 2022-05-05 LAB
ALBUMIN SERPL BCP-MCNC: 3.5 G/DL (ref 3.5–5.2)
ALP SERPL-CCNC: 78 U/L (ref 55–135)
ALT SERPL W/O P-5'-P-CCNC: 13 U/L (ref 10–44)
ANION GAP SERPL CALC-SCNC: 10 MMOL/L (ref 8–16)
AST SERPL-CCNC: 24 U/L (ref 10–40)
BILIRUB SERPL-MCNC: 0.7 MG/DL (ref 0.1–1)
BUN SERPL-MCNC: 10 MG/DL (ref 8–23)
CALCIUM SERPL-MCNC: 9.1 MG/DL (ref 8.7–10.5)
CHLORIDE SERPL-SCNC: 106 MMOL/L (ref 95–110)
CO2 SERPL-SCNC: 26 MMOL/L (ref 23–29)
COMPLEXED PSA SERPL-MCNC: 1 NG/ML (ref 0–4)
CREAT SERPL-MCNC: 0.8 MG/DL (ref 0.5–1.4)
EST. GFR  (AFRICAN AMERICAN): >60 ML/MIN/1.73 M^2
EST. GFR  (NON AFRICAN AMERICAN): >60 ML/MIN/1.73 M^2
GLUCOSE SERPL-MCNC: 105 MG/DL (ref 70–110)
POTASSIUM SERPL-SCNC: 5 MMOL/L (ref 3.5–5.1)
PROT SERPL-MCNC: 7 G/DL (ref 6–8.4)
SODIUM SERPL-SCNC: 142 MMOL/L (ref 136–145)

## 2022-05-05 PROCEDURE — 71046 X-RAY EXAM CHEST 2 VIEWS: CPT | Mod: TC,FY,PO

## 2022-05-05 PROCEDURE — 99499 NO LOS: ICD-10-PCS | Mod: S$PBB,,, | Performed by: ALLERGY & IMMUNOLOGY

## 2022-05-05 PROCEDURE — 71046 X-RAY EXAM CHEST 2 VIEWS: CPT | Mod: 26,,, | Performed by: RADIOLOGY

## 2022-05-05 PROCEDURE — 95117 IMMUNOTHERAPY INJECTIONS: CPT | Mod: PBBFAC,PO

## 2022-05-05 PROCEDURE — 71046 XR CHEST PA AND LATERAL: ICD-10-PCS | Mod: 26,,, | Performed by: RADIOLOGY

## 2022-05-05 PROCEDURE — 99499 UNLISTED E&M SERVICE: CPT | Mod: S$PBB,,, | Performed by: ALLERGY & IMMUNOLOGY

## 2022-05-05 NOTE — TELEPHONE ENCOUNTER
Pt said he is still having SOB and coughing while on his allergy shot. He was administered  0.3 of the yellow vial today.       Vial #2    set 1 of  2   YELLOW 1:10 C/APD/W.      Vial #2   set 2 of  2  YELLOW 1:10 DM\GR\TR.

## 2022-05-05 NOTE — PROGRESS NOTES
No issues with last injections  No new meds or eye drops  No wheezing or inhaler use for 48 hours     Vial #2    set 1 of  2   YELLOW 1:10 C/APD/W. Expiration 10/1/2022. Administered 0.3ml in upper left arm.       Vial #2   set 2 of  2  YELLOW 1:10 DM\GR\TR. Expiration 10/1/2022. Administered 0.3ml in upper right arm.       Tolerated well   Pt instructed to remain in clinic for 30 minutes to monitor for rxn  Verbalized understanding

## 2022-05-12 ENCOUNTER — CLINICAL SUPPORT (OUTPATIENT)
Dept: ALLERGY | Facility: CLINIC | Age: 72
End: 2022-05-12
Payer: MEDICARE

## 2022-05-12 DIAGNOSIS — J30.9 CHRONIC ALLERGIC RHINITIS: Primary | ICD-10-CM

## 2022-05-12 PROCEDURE — 95117 IMMUNOTHERAPY INJECTIONS: CPT | Mod: PBBFAC,PO

## 2022-05-12 PROCEDURE — 99499 NO LOS: ICD-10-PCS | Mod: S$PBB,,, | Performed by: ALLERGY & IMMUNOLOGY

## 2022-05-12 PROCEDURE — 99499 UNLISTED E&M SERVICE: CPT | Mod: S$PBB,,, | Performed by: ALLERGY & IMMUNOLOGY

## 2022-05-12 NOTE — PROGRESS NOTES
No issues with last injections  No new meds or eye drops  No wheezing or inhaler use for 48 hours     Vial #2    set 1 of  2   YELLOW 1:10 C/APD/W. Expiration 10/1/2022. Administered 0.35ml in upper left arm.       Vial #2   set 2 of  2  YELLOW 1:10 DM\GR\TR. Expiration 10/1/2022. Administered 0.35ml in upper right arm.       Tolerated well   Pt instructed to remain in clinic for 30 minutes to monitor for rxn  Verbalized understanding

## 2022-05-26 ENCOUNTER — CLINICAL SUPPORT (OUTPATIENT)
Dept: ALLERGY | Facility: CLINIC | Age: 72
End: 2022-05-26
Payer: MEDICARE

## 2022-05-26 DIAGNOSIS — J30.9 CHRONIC ALLERGIC RHINITIS: Primary | ICD-10-CM

## 2022-05-26 PROCEDURE — 95117 IMMUNOTHERAPY INJECTIONS: CPT | Mod: PBBFAC,PO

## 2022-05-26 PROCEDURE — 99999 PR PBB SHADOW E&M-EST. PATIENT-LVL I: ICD-10-PCS | Mod: PBBFAC,,,

## 2022-05-26 PROCEDURE — 99499 NO LOS: ICD-10-PCS | Mod: S$PBB,,, | Performed by: ALLERGY & IMMUNOLOGY

## 2022-05-26 PROCEDURE — 99499 UNLISTED E&M SERVICE: CPT | Mod: S$PBB,,, | Performed by: ALLERGY & IMMUNOLOGY

## 2022-05-26 PROCEDURE — 99999 PR PBB SHADOW E&M-EST. PATIENT-LVL I: CPT | Mod: PBBFAC,,,

## 2022-05-26 NOTE — PROGRESS NOTES
No issues with last injections  No new meds or eye drops  No wheezing or inhaler use for 48 hours     Vial #2    set 1 of  2   YELLOW 1:10 C/APD/W. Expiration 10/1/2022. Administered 0.4ml in upper left arm.       Vial #2   set 2 of  2  YELLOW 1:10 DM\GR\TR. Expiration 10/1/2022. Administered 0.4ml in upper right arm.       Tolerated well   Pt instructed to remain in clinic for 30 minutes to monitor for rxn  Verbalized understanding

## 2022-06-09 ENCOUNTER — CLINICAL SUPPORT (OUTPATIENT)
Dept: ALLERGY | Facility: CLINIC | Age: 72
End: 2022-06-09
Payer: MEDICARE

## 2022-06-09 DIAGNOSIS — J30.9 CHRONIC ALLERGIC RHINITIS: Primary | ICD-10-CM

## 2022-06-09 PROCEDURE — 99499 NO LOS: ICD-10-PCS | Mod: S$PBB,,, | Performed by: ALLERGY & IMMUNOLOGY

## 2022-06-09 PROCEDURE — 99499 UNLISTED E&M SERVICE: CPT | Mod: S$PBB,,, | Performed by: ALLERGY & IMMUNOLOGY

## 2022-06-09 PROCEDURE — 95117 IMMUNOTHERAPY INJECTIONS: CPT | Mod: PBBFAC,PO

## 2022-06-09 NOTE — PROGRESS NOTES
No issues with last injections  No new meds or eye drops  No wheezing or inhaler use for 48 hours     Vial #2    set 1 of  2   YELLOW 1:10 C/APD/W. Expiration 10/1/2022. Administered 0.45ml in upper left arm.       Vial #2   set 2 of  2  YELLOW 1:10 DM\GR\TR. Expiration 10/1/2022. Administered 0.45ml in upper right arm.       Tolerated well   Pt instructed to remain in clinic for 30 minutes to monitor for rxn  Verbalized understanding

## 2022-06-16 ENCOUNTER — CLINICAL SUPPORT (OUTPATIENT)
Dept: ALLERGY | Facility: CLINIC | Age: 72
End: 2022-06-16
Payer: MEDICARE

## 2022-06-16 DIAGNOSIS — J30.9 CHRONIC ALLERGIC RHINITIS: Primary | ICD-10-CM

## 2022-06-16 PROCEDURE — 99499 UNLISTED E&M SERVICE: CPT | Mod: S$PBB,,, | Performed by: ALLERGY & IMMUNOLOGY

## 2022-06-16 PROCEDURE — 99499 NO LOS: ICD-10-PCS | Mod: S$PBB,,, | Performed by: ALLERGY & IMMUNOLOGY

## 2022-06-16 PROCEDURE — 95117 IMMUNOTHERAPY INJECTIONS: CPT | Mod: PBBFAC,PO

## 2022-06-16 NOTE — PROGRESS NOTES
No issues with last injections  No new meds or eye drops  No wheezing or inhaler use for 48 hours     Vial #2   set 1 of  2   YELLOW 1:10 C/APD/W. Expiration 10/1/2022. Administered 0.5ml in upper left arm.       Vial #2  set 2 of  2  YELLOW 1:10 DM\GR\TR. Expiration 10/1/2022. Administered 0.5ml in upper right arm.       Tolerated well   Pt instructed to remain in clinic for 30 minutes to monitor for rxn  Verbalized understanding

## 2022-06-30 ENCOUNTER — CLINICAL SUPPORT (OUTPATIENT)
Dept: ALLERGY | Facility: CLINIC | Age: 72
End: 2022-06-30
Payer: MEDICARE

## 2022-06-30 DIAGNOSIS — J30.9 CHRONIC ALLERGIC RHINITIS: Primary | ICD-10-CM

## 2022-06-30 PROCEDURE — 99499 UNLISTED E&M SERVICE: CPT | Mod: S$PBB,,, | Performed by: ALLERGY & IMMUNOLOGY

## 2022-06-30 PROCEDURE — 95115 IMMUNOTHERAPY ONE INJECTION: CPT | Mod: PBBFAC,PO

## 2022-06-30 PROCEDURE — 99499 NO LOS: ICD-10-PCS | Mod: S$PBB,,, | Performed by: ALLERGY & IMMUNOLOGY

## 2022-06-30 NOTE — PROGRESS NOTES
No issues with last injections  No new meds or eye drops  No wheezing or inhaler use for 48 hours     Vial #1   set 1 of  2   RED 1:1  C/APD/W. Expiration 10/1/2022. Administered 0.1 ml in upper left arm.       Vial #1  set  2 of  2  RED 1:1  DM\GR\TR. Expiration 10/1/2022. Administered 0.1 ml in upper right arm.       Tolerated well   Pt instructed to remain in clinic for 30 minutes to monitor for rxn  Verbalized understanding

## 2022-07-05 ENCOUNTER — CLINICAL SUPPORT (OUTPATIENT)
Dept: ALLERGY | Facility: CLINIC | Age: 72
End: 2022-07-05
Payer: MEDICARE

## 2022-07-05 DIAGNOSIS — J30.9 CHRONIC ALLERGIC RHINITIS: Primary | ICD-10-CM

## 2022-07-05 PROCEDURE — 99499 UNLISTED E&M SERVICE: CPT | Mod: S$PBB,,, | Performed by: ALLERGY & IMMUNOLOGY

## 2022-07-05 PROCEDURE — 95117 IMMUNOTHERAPY INJECTIONS: CPT | Mod: PBBFAC,PO

## 2022-07-05 PROCEDURE — 99999 PR PBB SHADOW E&M-EST. PATIENT-LVL I: CPT | Mod: PBBFAC,,,

## 2022-07-05 PROCEDURE — 99999 PR PBB SHADOW E&M-EST. PATIENT-LVL I: ICD-10-PCS | Mod: PBBFAC,,,

## 2022-07-05 PROCEDURE — 99499 NO LOS: ICD-10-PCS | Mod: S$PBB,,, | Performed by: ALLERGY & IMMUNOLOGY

## 2022-07-05 NOTE — PROGRESS NOTES
No issues with last injections  No new meds or eye drops  No wheezing or inhaler use for 48 hours     Vial #1   set 1 of  2   RED 1:1  C/APD/W. Expiration 10/1/2022. Administered 0.15 ml in upper left arm.       Vial #1  set  2 of  2  RED 1:1  DM\GR\TR. Expiration 10/1/2022. Administered 0.15 ml in upper right arm.       Tolerated well   Pt instructed to remain in clinic for 30 minutes to monitor for rxn  Verbalized understanding

## 2022-07-12 ENCOUNTER — TELEPHONE (OUTPATIENT)
Dept: ALLERGY | Facility: CLINIC | Age: 72
End: 2022-07-12
Payer: MEDICARE

## 2022-07-12 NOTE — TELEPHONE ENCOUNTER
----- Message from Margie Romano sent at 7/12/2022  8:43 AM CDT -----  Contact: [phone]  Type: Needs Medical Advice  Who Called:  Pt    Best Call Back Number: [phone]    Additional Information: Pt calling to schedule his appt for allergy injections. Pt states he usually does thursdays around 9 or 9:30. Pls call back and advise

## 2022-07-14 ENCOUNTER — CLINICAL SUPPORT (OUTPATIENT)
Dept: ALLERGY | Facility: CLINIC | Age: 72
End: 2022-07-14
Payer: MEDICARE

## 2022-07-14 DIAGNOSIS — J30.9 CHRONIC ALLERGIC RHINITIS: Primary | ICD-10-CM

## 2022-07-14 PROCEDURE — 99499 NO LOS: ICD-10-PCS | Mod: S$PBB,,, | Performed by: ALLERGY & IMMUNOLOGY

## 2022-07-14 PROCEDURE — 99499 UNLISTED E&M SERVICE: CPT | Mod: S$PBB,,, | Performed by: ALLERGY & IMMUNOLOGY

## 2022-07-14 PROCEDURE — 95117 IMMUNOTHERAPY INJECTIONS: CPT | Mod: PBBFAC,PO

## 2022-07-14 NOTE — PROGRESS NOTES
No issues with last injections  No new meds or eye drops  No wheezing or inhaler use for 48 hours     Vial #1   set 1 of  2   RED 1:1  C/APD/W. Expiration 10/1/2022. Administered 0.2 ml in upper left arm.       Vial #1  set  2 of  2  RED 1:1  DM\GR\TR. Expiration 10/1/2022. Administered 0.2 ml in upper right arm.       Tolerated well   Pt instructed to remain in clinic for 30 minutes to monitor for rxn  Verbalized understanding

## 2022-07-21 ENCOUNTER — CLINICAL SUPPORT (OUTPATIENT)
Dept: ALLERGY | Facility: CLINIC | Age: 72
End: 2022-07-21
Payer: MEDICARE

## 2022-07-21 DIAGNOSIS — J30.9 CHRONIC ALLERGIC RHINITIS: Primary | ICD-10-CM

## 2022-07-21 PROCEDURE — 95117 IMMUNOTHERAPY INJECTIONS: CPT | Mod: PBBFAC,PO

## 2022-07-21 PROCEDURE — 99499 NO LOS: ICD-10-PCS | Mod: S$PBB,,, | Performed by: ALLERGY & IMMUNOLOGY

## 2022-07-21 PROCEDURE — 99499 UNLISTED E&M SERVICE: CPT | Mod: S$PBB,,, | Performed by: ALLERGY & IMMUNOLOGY

## 2022-07-21 NOTE — PROGRESS NOTES
No issues with last injections  No new meds or eye drops  No wheezing or inhaler use for 48 hours     Vial #1   set 1 of  2   RED 1:1  C/APD/W. Expiration 10/1/2022. Administered 0.25 ml in upper left arm.       Vial #1  set  2 of  2  RED 1:1  DM\GR\TR. Expiration 10/1/2022. Administered 0.25 ml in upper right arm.       Tolerated well   Pt instructed to remain in clinic for 30 minutes to monitor for rxn  Verbalized understanding

## 2022-07-28 ENCOUNTER — CLINICAL SUPPORT (OUTPATIENT)
Dept: ALLERGY | Facility: CLINIC | Age: 72
End: 2022-07-28
Payer: MEDICARE

## 2022-07-28 DIAGNOSIS — J30.9 CHRONIC ALLERGIC RHINITIS: Primary | ICD-10-CM

## 2022-07-28 PROCEDURE — 99499 NO LOS: ICD-10-PCS | Mod: S$PBB,,, | Performed by: ALLERGY & IMMUNOLOGY

## 2022-07-28 PROCEDURE — 99499 UNLISTED E&M SERVICE: CPT | Mod: S$PBB,,, | Performed by: ALLERGY & IMMUNOLOGY

## 2022-07-28 PROCEDURE — 95117 IMMUNOTHERAPY INJECTIONS: CPT | Mod: PBBFAC,PO

## 2022-07-28 NOTE — PROGRESS NOTES
No issues with last injections  No new meds or eye drops  No wheezing or inhaler use for 48 hours     Vial #1   set 1 of  2   RED 1:1  C/APD/W. Expiration 10/1/2022. Administered 0.3 ml in upper left arm.       Vial #1  set  2 of  2  RED 1:1  DM\GR\TR. Expiration 10/1/2022. Administered 0.3 ml in upper right arm.       Tolerated well   Pt instructed to remain in clinic for 30 minutes to monitor for rxn  Verbalized understanding

## 2022-08-04 ENCOUNTER — CLINICAL SUPPORT (OUTPATIENT)
Dept: ALLERGY | Facility: CLINIC | Age: 72
End: 2022-08-04
Payer: MEDICARE

## 2022-08-04 DIAGNOSIS — J30.9 CHRONIC ALLERGIC RHINITIS: Primary | ICD-10-CM

## 2022-08-04 PROCEDURE — 99499 UNLISTED E&M SERVICE: CPT | Mod: S$PBB,,, | Performed by: ALLERGY & IMMUNOLOGY

## 2022-08-04 PROCEDURE — 95117 IMMUNOTHERAPY INJECTIONS: CPT | Mod: PBBFAC,PO

## 2022-08-04 PROCEDURE — 99499 NO LOS: ICD-10-PCS | Mod: S$PBB,,, | Performed by: ALLERGY & IMMUNOLOGY

## 2022-08-04 NOTE — PROGRESS NOTES
No issues with last injections  No new meds or eye drops  No wheezing or inhaler use for 48 hours     Vial #1   set 1 of  2   RED 1:1  C/APD/W. Expiration 10/1/2022. Administered 0.35 ml in upper left arm.       Vial #1  set  2 of  2  RED 1:1  DM\GR\TR. Expiration 10/1/2022. Administered 0.35 ml in upper right arm.       Tolerated well   Pt instructed to remain in clinic for 30 minutes to monitor for rxn  Verbalized understanding

## 2022-08-11 ENCOUNTER — CLINICAL SUPPORT (OUTPATIENT)
Dept: ALLERGY | Facility: CLINIC | Age: 72
End: 2022-08-11
Payer: MEDICARE

## 2022-08-11 DIAGNOSIS — J30.9 CHRONIC ALLERGIC RHINITIS: Primary | ICD-10-CM

## 2022-08-11 PROCEDURE — 99499 NO LOS: ICD-10-PCS | Mod: S$PBB,,, | Performed by: ALLERGY & IMMUNOLOGY

## 2022-08-11 PROCEDURE — 99499 UNLISTED E&M SERVICE: CPT | Mod: S$PBB,,, | Performed by: ALLERGY & IMMUNOLOGY

## 2022-08-11 PROCEDURE — 95117 IMMUNOTHERAPY INJECTIONS: CPT | Mod: PBBFAC,PO

## 2022-08-11 NOTE — PROGRESS NOTES
No issues with last injections  No new meds or eye drops  No wheezing or inhaler use for 48 hours     Vial #1   set 1 of  2   RED 1:1  C/APD/W. Expiration 10/1/2022. Administered 0.4 ml in upper left arm.       Vial #1  set  2 of  2  RED 1:1  DM\GR\TR. Expiration 10/1/2022. Administered 0.4 ml in upper right arm.       Tolerated well   Pt instructed to remain in clinic for 30 minutes to monitor for rxn  Verbalized understanding

## 2022-08-18 ENCOUNTER — CLINICAL SUPPORT (OUTPATIENT)
Dept: ALLERGY | Facility: CLINIC | Age: 72
End: 2022-08-18
Payer: MEDICARE

## 2022-08-18 ENCOUNTER — TELEPHONE (OUTPATIENT)
Dept: ALLERGY | Facility: CLINIC | Age: 72
End: 2022-08-18
Payer: MEDICARE

## 2022-08-18 DIAGNOSIS — J30.9 CHRONIC ALLERGIC RHINITIS: Primary | ICD-10-CM

## 2022-08-18 PROCEDURE — 95117 IMMUNOTHERAPY INJECTIONS: CPT | Mod: PBBFAC,PO

## 2022-08-18 PROCEDURE — 99499 NO LOS: ICD-10-PCS | Mod: S$PBB,,, | Performed by: ALLERGY & IMMUNOLOGY

## 2022-08-18 PROCEDURE — 99499 UNLISTED E&M SERVICE: CPT | Mod: S$PBB,,, | Performed by: ALLERGY & IMMUNOLOGY

## 2022-08-18 NOTE — TELEPHONE ENCOUNTER
----- Message from Rui Harris sent at 8/18/2022  8:48 AM CDT -----       Type: Patient Returning Call    Who Called: Patient   Who Left Message for Patient: NA  Does the patient know what this is regarding?: Patient has recurring appointments every Thursday to receive an allergy shot, but he was not scheduled this Thursday (08/18/2022). He would like a call back to be scheduled.   Would the patient rather a call back or a response via [facility]? Call   Best Call Back Number: [phone]  Additional Information: Please assist, thank you!

## 2022-08-18 NOTE — PROGRESS NOTES
No issues with last injections  No new meds or eye drops  No wheezing or inhaler use for 48 hours     Vial #1   set 1 of  2   RED 1:1  C/APD/W. Expiration 10/1/2022. Administered 0.45 ml in upper left arm.       Vial #1  set  2 of  2  RED 1:1  DM\GR\TR. Expiration 10/1/2022. Administered 0.45 ml in upper right arm.       Tolerated well   Pt instructed to remain in clinic for 30 minutes to monitor for rxn  Verbalized understanding

## 2022-08-25 ENCOUNTER — CLINICAL SUPPORT (OUTPATIENT)
Dept: ALLERGY | Facility: CLINIC | Age: 72
End: 2022-08-25
Payer: MEDICARE

## 2022-08-25 DIAGNOSIS — J30.9 CHRONIC ALLERGIC RHINITIS: Primary | ICD-10-CM

## 2022-08-25 PROCEDURE — 99499 NO LOS: ICD-10-PCS | Mod: S$PBB,,, | Performed by: ALLERGY & IMMUNOLOGY

## 2022-08-25 PROCEDURE — 95117 IMMUNOTHERAPY INJECTIONS: CPT | Mod: PBBFAC,PO

## 2022-08-25 PROCEDURE — 99499 UNLISTED E&M SERVICE: CPT | Mod: S$PBB,,, | Performed by: ALLERGY & IMMUNOLOGY

## 2022-08-25 NOTE — PROGRESS NOTES
No issues with last injections  No new meds or eye drops  No wheezing or inhaler use for 48 hours     Vial #1   set 1 of  2   RED 1:1  C/APD/W. Expiration 10/1/2022. Administered 0.5 ml in upper left arm.       Vial #1  set  2 of  2  RED 1:1  DM\GR\TR. Expiration 10/1/2022. Administered 0.5 ml in upper right arm.       Tolerated well   Pt instructed to remain in clinic for 30 minutes to monitor for rxn  Verbalized understanding

## 2022-09-03 ENCOUNTER — PATIENT MESSAGE (OUTPATIENT)
Dept: ALLERGY | Facility: CLINIC | Age: 72
End: 2022-09-03
Payer: MEDICARE

## 2022-10-03 ENCOUNTER — PATIENT MESSAGE (OUTPATIENT)
Dept: FAMILY MEDICINE | Facility: CLINIC | Age: 72
End: 2022-10-03
Payer: MEDICARE

## 2022-10-03 RX ORDER — VORTIOXETINE 10 MG/1
10 TABLET, FILM COATED ORAL DAILY
Qty: 90 TABLET | Refills: 0 | Status: SHIPPED | OUTPATIENT
Start: 2022-10-03 | End: 2022-10-27 | Stop reason: SDUPTHER

## 2022-10-10 ENCOUNTER — HOSPITAL ENCOUNTER (OUTPATIENT)
Dept: RADIOLOGY | Facility: HOSPITAL | Age: 72
Discharge: HOME | End: 2022-10-10
Attending: EMERGENCY MEDICINE
Payer: MEDICARE

## 2022-10-10 DIAGNOSIS — J84.9 ILD (INTERSTITIAL LUNG DISEASE): ICD-10-CM

## 2022-10-10 PROCEDURE — 71046 XR CHEST PA AND LATERAL: ICD-10-PCS | Mod: 26,,, | Performed by: RADIOLOGY

## 2022-10-10 PROCEDURE — 71046 X-RAY EXAM CHEST 2 VIEWS: CPT | Mod: TC,FY,PO

## 2022-10-10 PROCEDURE — 71046 X-RAY EXAM CHEST 2 VIEWS: CPT | Mod: 26,,, | Performed by: RADIOLOGY

## 2022-10-27 ENCOUNTER — OFFICE VISIT (OUTPATIENT)
Dept: PSYCHIATRY | Facility: CLINIC | Age: 72
End: 2022-10-27
Payer: MEDICARE

## 2022-10-27 VITALS
HEIGHT: 72 IN | DIASTOLIC BLOOD PRESSURE: 82 MMHG | SYSTOLIC BLOOD PRESSURE: 136 MMHG | WEIGHT: 210.13 LBS | HEART RATE: 75 BPM | BODY MASS INDEX: 28.46 KG/M2

## 2022-10-27 DIAGNOSIS — F33.1 MODERATE EPISODE OF RECURRENT MAJOR DEPRESSIVE DISORDER: Primary | ICD-10-CM

## 2022-10-27 PROCEDURE — 99999 PR PBB SHADOW E&M-EST. PATIENT-LVL III: CPT | Mod: PBBFAC,,, | Performed by: PSYCHOLOGIST

## 2022-10-27 PROCEDURE — 99213 OFFICE O/P EST LOW 20 MIN: CPT | Mod: PBBFAC,PO | Performed by: PSYCHOLOGIST

## 2022-10-27 PROCEDURE — 99213 OFFICE O/P EST LOW 20 MIN: CPT | Mod: S$PBB,,, | Performed by: PSYCHOLOGIST

## 2022-10-27 PROCEDURE — 99213 PR OFFICE/OUTPT VISIT, EST, LEVL III, 20-29 MIN: ICD-10-PCS | Mod: S$PBB,,, | Performed by: PSYCHOLOGIST

## 2022-10-27 PROCEDURE — 90833 PSYTX W PT W E/M 30 MIN: CPT | Mod: ,,, | Performed by: PSYCHOLOGIST

## 2022-10-27 PROCEDURE — 99999 PR PBB SHADOW E&M-EST. PATIENT-LVL III: ICD-10-PCS | Mod: PBBFAC,,, | Performed by: PSYCHOLOGIST

## 2022-10-27 PROCEDURE — 90833 PR PSYCHOTHERAPY W/PATIENT W/E&M, 30 MIN (ADD ON): ICD-10-PCS | Mod: ,,, | Performed by: PSYCHOLOGIST

## 2022-10-27 RX ORDER — SILDENAFIL 50 MG/1
50 TABLET, FILM COATED ORAL DAILY PRN
COMMUNITY
End: 2023-01-01 | Stop reason: CLARIF

## 2022-10-27 RX ORDER — VORTIOXETINE 10 MG/1
10 TABLET, FILM COATED ORAL DAILY
Qty: 90 TABLET | Refills: 3 | Status: ON HOLD | OUTPATIENT
Start: 2022-10-27 | End: 2023-01-01 | Stop reason: SDUPTHER

## 2022-10-27 NOTE — PROGRESS NOTES
"  Outpatient Psychiatry Follow-Up Visit    Clinical Status of Patient: Outpatient (Ambulatory)  10/27/2022     Chief Complaint: 70 year old male presenting today for a follow-up.       Interval History and Content of Current Session:  Interim Events/Subjective Report/Content of Current Session:  follow-up appointment.    Pt is a 70 year old male with past psychiatric hx of depression, alcoholism, anxiety  who presents for follow-up treatment. Pt reported good, sustained improvement in his mood. Pt said that the lower dose of Trintellix is just as effective and doesn't seem to "stifle my creativity". Pt went to [facility] and has started a new treatment regimen for his breathing and is hopeful that he may have a new lease on life. Pt hopes to restart psychotherapy do process how he might live his life differently. Pt has had a couple of times in which he has went out of town and been off of her Trintellix for a few days and noted panic attacks returning.     Past Psychiatric hx: inpt for polysubstance in 1991 and prior treatment with Zoloft, Xanax, Lamictal, Wellbutrin, trazodone, Paxil, Effexor, Elavil, Cymbalta, Lexapro, Celexa, and Prozac    Past Medical hx:   Past Medical History:   Diagnosis Date    Anxiety     Anxiety     Atherosclerosis of coronary artery     per CT scan dated 7/13/18    Depression     Diverticulosis of intestine without bleeding     GERD (gastroesophageal reflux disease)     HTN (hypertension)     Hyperlipidemia     Hypertension     Plantar fasciitis     Thoracic aorta atherosclerosis     per CT dated 7/13/18. sent for scanning    Vitamin D deficiency     Vitamin D deficiency         Interim hx:  Medication changes last visit:   reduced Trintellix dosage to 10mg Q D  Anxiety: mild - improved  Depression: mild - improved     Denies suicidal/homicidal ideations.  Denies hopelessness/worthlessness.    Denies auditory/visual hallucinations      Alcohol: in recovery  Drug: pt denied  Caffeine: minimal " use  Tobacco: pt denied      Review of Systems   PSYCHIATRIC: Pertinent items are noted in the narrative.        CONSTITUTIONAL: weight stable     Past Medical, Family and Social History: The patient's past medical, family and social history have been reviewed and updated as appropriate within the electronic medical record. See encounter notes.     Current Psychiatric Medication:  Trintellix 10mg Q D     Compliance: yes      Side effects: pt denied     Risk Parameters:  Patient reports no suicidal ideation  Patient reports no homicidal ideation  Patient reports no self-injurious behavior  Patient reports no violent behavior     Exam (detailed: at least 9 elements; comprehensive: all 15 elements)   Constitutional  Vitals:  Most recent vital signs, dated less than 90 days prior to this appointment, were reviewed. Pulse:  [75]   BP: (136)/(82)       General:  unremarkable, age appropriate, casual attire, good eye contact, good rapport       Musculoskeletal  Muscle Strength/Tone:  no flaccidity, no tremor    Gait & Station:  normal      Psychiatric                       Speech:  normal tone, normal rate, rhythm, and volume   Mood & Affect:   Depressed, anxious         Thought Process:   Goal directed; Linear    Associations:   intact   Thought Content:   No SI/HI, delusions, or paranoia, no AV/VH   Insight & Judgement:   Good, adequate to circumstances   Orientation:   grossly intact; alert and oriented x 4    Memory:  intact for content of interview    Language:  grossly intact, can repeat    Attention Span  : Grossly intact for content of interview   Fund of Knowledge:   intact and appropriate to age and level of education        Assessment and Diagnosis   Status/Progress: stable     Impression: Pt suffers from recurrent depression and panic attacks. Pt has a long history of alcohol dependence with a recent relapse and a history of cocaine abuse. Pt is concerned about his memory decline but this may be related to  anxiety about his health.     Diagnosis: 1) Major Depressive Disorder, recurrent, moderate                          2) Alcohol Use Disorder, severe, in current remission    Intervention/Counseling/Treatment Plan   Medication Management:      1. Continue Trintellix 10mg Q D     2. Restart psychotherapy     3. Call to report any worsening of symptoms or problems with the medication. Pt instructed to go to ER with thoughts of harming self, others     4. Patient given contact # for psychotherapists at [facility] and also instructed to check with insurance for list of providers.     Psychotherapy:   Target symptoms:   Why chosen therapy is appropriate versus another modality: CBT used; relevant to diagnosis, patient responds to this modality  Outcome monitoring methods: self-report, observation  Therapeutic intervention type: Cognitive Behavioral Therapy  Topics discussed/themes: building skills sets for symptom management, symptom recognition, nutrition, exercise  The patient's response to the intervention is good  Patient's response to treatment is: good.   The patient's progress toward treatment goals: stable  16 minutes spent with pt in psychotherapy and 5 minutes in medication management     Return to clinic: 1 year or earlier PRN    -Cognitive-Behavioral/Supportive therapy and psychoeducation provided  -R/B/SE's of medications discussed with the pt who expresses understanding and chooses to take medications as prescribed.   -Pt instructed to call clinic, 911 or go to nearest emergency room if sxs worsen or pt is in   crisis. The pt expresses understanding.    Thomas Temple, PhD, MP

## 2022-11-02 ENCOUNTER — OFFICE VISIT (OUTPATIENT)
Dept: OTOLARYNGOLOGY | Facility: CLINIC | Age: 72
End: 2022-11-02
Payer: MEDICARE

## 2022-11-02 VITALS — BODY MASS INDEX: 28.6 KG/M2 | WEIGHT: 211.19 LBS | HEIGHT: 72 IN

## 2022-11-02 DIAGNOSIS — J30.9 ALLERGIC SINUSITIS: Primary | ICD-10-CM

## 2022-11-02 DIAGNOSIS — K21.9 LARYNGOPHARYNGEAL REFLUX (LPR): ICD-10-CM

## 2022-11-02 DIAGNOSIS — M30.1 EOSINOPHILIC GRANULOMATOSIS WITH POLYANGIITIS (EGPA): ICD-10-CM

## 2022-11-02 DIAGNOSIS — D72.18 EOSINOPHILIC GRANULOMATOSIS WITH POLYANGIITIS (EGPA): ICD-10-CM

## 2022-11-02 DIAGNOSIS — J34.2 DNS (DEVIATED NASAL SEPTUM): ICD-10-CM

## 2022-11-02 PROCEDURE — 99204 PR OFFICE/OUTPT VISIT, NEW, LEVL IV, 45-59 MIN: ICD-10-PCS | Mod: 25,S$PBB,, | Performed by: STUDENT IN AN ORGANIZED HEALTH CARE EDUCATION/TRAINING PROGRAM

## 2022-11-02 PROCEDURE — 92511 NASOPHARYNGOSCOPY: CPT | Mod: PBBFAC,PO | Performed by: STUDENT IN AN ORGANIZED HEALTH CARE EDUCATION/TRAINING PROGRAM

## 2022-11-02 PROCEDURE — 99213 OFFICE O/P EST LOW 20 MIN: CPT | Mod: PBBFAC,PO | Performed by: STUDENT IN AN ORGANIZED HEALTH CARE EDUCATION/TRAINING PROGRAM

## 2022-11-02 PROCEDURE — 99204 OFFICE O/P NEW MOD 45 MIN: CPT | Mod: 25,S$PBB,, | Performed by: STUDENT IN AN ORGANIZED HEALTH CARE EDUCATION/TRAINING PROGRAM

## 2022-11-02 PROCEDURE — 99999 PR PBB SHADOW E&M-EST. PATIENT-LVL III: CPT | Mod: PBBFAC,,, | Performed by: STUDENT IN AN ORGANIZED HEALTH CARE EDUCATION/TRAINING PROGRAM

## 2022-11-02 PROCEDURE — 92511 PR NASOPHARYNGOSCOPY: ICD-10-PCS | Mod: S$PBB,,, | Performed by: STUDENT IN AN ORGANIZED HEALTH CARE EDUCATION/TRAINING PROGRAM

## 2022-11-02 PROCEDURE — 99999 PR PBB SHADOW E&M-EST. PATIENT-LVL III: ICD-10-PCS | Mod: PBBFAC,,, | Performed by: STUDENT IN AN ORGANIZED HEALTH CARE EDUCATION/TRAINING PROGRAM

## 2022-11-02 PROCEDURE — 92511 NASOPHARYNGOSCOPY: CPT | Mod: S$PBB,,, | Performed by: STUDENT IN AN ORGANIZED HEALTH CARE EDUCATION/TRAINING PROGRAM

## 2022-11-02 RX ORDER — AZELASTINE 1 MG/ML
1 SPRAY, METERED NASAL 2 TIMES DAILY
Qty: 30 ML | Refills: 11 | Status: SHIPPED | OUTPATIENT
Start: 2022-11-02 | End: 2023-01-01

## 2022-11-02 NOTE — PROGRESS NOTES
"  Otolaryngology Clinic Note    Subjective:       Patient ID: Chris Quiroz is a 72 y.o. male.    Chief Complaint: Sinus Problem (Having a hard time breathing - sent by pulmonologist )      History of Present Illness: Chris Quiroz is a 72 y.o. male presenting with recent workup at [facility] for chronic cough, swallowing issue, dyspnea. 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 Was a former smoker, quit 2001.   Sinus: headaches, PND, coughing phlegm, congestion. He is currently on bactrim for 30 days and steroids per pulmonologist. He has had prior allergy shots without effect, stopped recently was on for less than a year. (Thinks it was a bit of everything, definitely cats.) Has tried flonase, thinks he has tried other sprays. Saline. Has tried daily antihistamine. Now PRN. Taking singulair for past year. Sputum is greyish. No time of the year is worse.   Denies nosebleeds or overdrying. No hyposmia.  Denies swallowing issues. Had normal swallow study. Coughing unrelated to swallowing.   Coughing is day and night, less so now with nebs, has helped with cough and wheezing.     Pulm note: 10/26/22: "Today the patient reports that he is feeling terrible.  Reports that he has had a chronic history of cough productive of gray/green sputum that started approximately 8 years ago with associated shortness of breath, wheezing.  He says that in the past he has been treated for allergies with allergy shots, nasal sprays, and Breo inhaler but this did not improve his symptoms.  He also had intermittent courses of oral corticosteroids which he says also have not really helped with his symptoms.  He endorses shortness of breath especially with exertion, says that he can  currently walk  feet before having to stop and catch his breath and this has been getting progressively worse.   He is a former smoker quit in 2001, smoked 2 packs per day for approximately 30 years.  No " "other use of drugs, vaping, marijuana.  He has a dog at home no other pets.  Works in a theater.  Denies any history to mold exposure reports he does have some autoimmune disease that runs in the family but is unsure of a particular diagnosis.  He endorses a 40 lb weight loss over the past 4-5 months because he says whenever he tries to eat he has a coughing spell. Prior CT scan in 2020 showing subpleural reticulation/fibrosis without honeycombing worse on the right with some associated traction bronchiectasis.      He did not have a formal workup for ILD until recently when he went to [facility] 1 month ago for another opinion.  Patient had extensive workup which was significant for a p-ANCA positivity, IgE level 1869, peripheral eosinophilia, elevated inflammatory markers, elevated exhaled nitric oxide.  Also had a swallow study with normal findings but a lateral pharyngeal pouch.  Evaluated by ENT with no concerns for aspiration.  Had a 6 minute walk test with desaturation to 85%.  Overnight oximetry with severe sleep disorder breathing.  TTE showed grade 1/3 left ventricular diastolic dysfunction, elevated mean arterial pressure estimated at 28.  A repeat CT done on 09/20/2022 showed fibrotic appearing opacities with CT pattern indeterminate for UIP with mild air trapping and peribronchovascular extension.  Right lung with greater involvement than left.  A presumptive diagnosis of EGPA was made at [facility] with recommendations for further evaluation with repeat ANCA profile to include MPO and PR3 testing as well as CT sinus with referral to ENT for possible sinus biopsy and consideration of bronchoscopy with BAL and transbronchial biopsy."      Past Surgical History:   Procedure Laterality Date    COLONOSCOPY      ESOPHAGOGASTRODUODENOSCOPY Left 12/7/2018    Procedure: EGD (ESOPHAGOGASTRODUODENOSCOPY);  Surgeon: Josiah Cuadra MD;  Location: [facility];  Service: Endoscopy;  Laterality: Left;    " "ESOPHAGOGASTRODUODENOSCOPY N/A 2/28/2021    Procedure: EGD (ESOPHAGOGASTRODUODENOSCOPY);  Surgeon: James Martines Jr., MD;  Location: [facility];  Service: Endoscopy;  Laterality: N/A;    Right Hand Surgery      TONSILLECTOMY      UMBILICAL HERNIA REPAIR N/A 5/19/2021    Procedure: REPAIR, HERNIA, UMBILICAL WITH MESH;  Surgeon: Hardik Ruiz MD;  Location: [facility];  Service: General;  Laterality: N/A;     Past Medical History:   Diagnosis Date    Anxiety     Anxiety     Atherosclerosis of coronary artery     per CT scan dated 7/13/18    Depression     Diverticulosis of intestine without bleeding     GERD (gastroesophageal reflux disease)     HTN (hypertension)     Hyperlipidemia     Hypertension     Plantar fasciitis     Thoracic aorta atherosclerosis     per CT dated 7/13/18. sent for scanning    Vitamin D deficiency     Vitamin D deficiency      Social Determinants of Health     Tobacco Use: Medium Risk    Smoking Tobacco Use: Former    Smokeless Tobacco Use: Never    Passive Exposure: Not on file   Alcohol Use: Not on file   Financial Resource Strain: Not on file   Food Insecurity: Not on file   Transportation Needs: Not on file   Physical Activity: Not on file   Stress: Not on file   Social Connections: Not on file   Housing Stability: Not on file   Depression: Not on file     Review of patient's allergies indicates:   Allergen Reactions    Dog dander Shortness Of Breath     Pt states " my lungs fill up and I cough"    Juniper Shortness Of Breath     States lungs fill up with fluid    Ragweed pollen      Other reaction(s): Cough    Nsaids (non-steroidal anti-inflammatory drug) Other (See Comments)     Gastric ulcers    Cat dander      Current Outpatient Medications   Medication Instructions    acetaminophen (TYLENOL) 650 mg, Oral, Every 6 hours PRN    albuterol (VENTOLIN HFA) 90 mcg/actuation inhaler 2 puffs, Inhalation, Every 6 hours PRN, Rescue    albuterol-ipratropium (DUO-NEB) 2.5 mg-0.5 mg/3 mL nebulizer " solution TAKE 3 ML BY NEBULIZATION EVERY 6 HOURS AS NEEDED FOR WHEEZE RESCUE Strength: 0.5 MG-3 MG(2.5 mg base)/3 mL    diphenhydrAMINE (BENADRYL) 25 mg, Oral, 2 times daily PRN    esomeprazole (NEXIUM) 40 MG capsule 1 capsule, Oral, Daily    fluticasone propionate (FLONASE) 50 mcg, Each Nostril, Daily    glucosamine-chondroitin 500-400 mg tablet 1 tablet, Oral, As needed (PRN)    melatonin (MELATIN) 2 mg, Oral, Nightly    montelukast (SINGULAIR) 10 mg tablet TAKE 1 TABLET BY MOUTH EVERY DAY IN THE EVENING    predniSONE (DELTASONE) 80 mg, Oral, Daily    rosuvastatin (CRESTOR) 10 mg, Oral, Daily    sildenafiL (VIAGRA) 50 mg, Oral, Daily PRN    sulfamethoxazole-trimethoprim 400-80mg (BACTRIM,SEPTRA) 400-80 mg per tablet 1 tablet, Oral, Daily    TRINTELLIX 10 mg, Oral, Daily         14 point ROS below  Answers submitted by the patient for this visit:  Review of Symptoms Questionnaire  (Submitted on 11/2/2022)  Fatigue (Tiredness)?: Yes  appetite change : Yes  Unexpected weight loss?: Yes  facial swelling: Yes  sinus pressure : Yes  postnasal drip: Yes  Light sensitivity / Light hurts the eyes?: Yes  Snoring?: Yes  shortness of breath: Yes  wheezing: Yes  cough: Yes  Foot swelling?: Yes  None of these: Yes  None of these: Yes  back pain: Yes  neck pain: Yes  rash: Yes  Seasonal Allergies?: Yes  None of these : Yes  dizziness: Yes  headaches: Yes  tremors: Yes  Light-headedness: Yes  None of these: Yes  decreased concentration: Yes  Feeling depressed?: Yes  nervous/ anxious: Yes  sleep disturbance: Yes                Objective:      There were no vitals filed for this visit.    General: NAD, well appearing  Eyes: Normal conjunctiva and lids  Face: symmetric, nerve intact  Nose: The nose is without any evidence of any deformity. The nasal mucosa is moist. The septum is midline. There is no evidence of septal hematoma. The turbinates are without abnormality.   Ears: The ears are with normal-appearing pinna. Examination of  the canals is normal appearing bilaterally. There is no drainage or erythema noted. The tympanic membranes are normal appearing with pearly color, normal-appearing landmarks and normal light reflex. Hearing is grossly intact.  Mouth: No obvious abnormalities to the lips. The teeth are unremarkable. The gingivae are without any obvious evidence of infection or lesion. The oral mucosa is moist and pink. There are no obvious masses to the hard or soft palate.   Oropharynx: The uvula is midline.  The tongue is midline. The posterior pharynx is without erythema or exudate. The tonsils are normal appearing.  Salivary glands: The salivary glands are symmetric and not enlarged, no masses  Neck: No lymphadenopathy, trachea midline, thryoid not enlarged.  Psych: Normal mood and affect.   Neuro: Grossly intact  Speech: fluent    Procedure: Flexible laryngoscopy  Indications: EGPA, sinusitis, cough  Verbal consent obtained  Anesthesia: lidocaine and phenylephrine nasal spray  Procedure: Scope was passed into right or left nare, to nasopharynx and down to visualize the glottis. Findings below. Patient tolerated procedure well.     - Nose WNL, spur on floor on left and right, no granulomas. Does have boggy inferior turbinates.   - Nasopharynx- WNL, no masses  - Oropharynx- BOT symmetric, no masses  - Hypopharynx and piriform sinuses- no masses on trumpet maneuver  - Supraglottis- Arytenoids intact, no masses, not edematous or erythematous  - Glottis- Bilateral vocal folds intact with full motion, no masses  - Glottic closure- complete       Assessment and Plan:       1. Allergic sinusitis    2. Eosinophilic granulomatosis with polyangiitis (EGPA)    3. Laryngopharyngeal reflux (LPR)    4. DNS (deviated nasal septum)        No evidence of vascular or granulomatous disease on scope today in nose or larynx. Has had positive allergy testing in past so suspect this is more allergic rhinitis, sinusitis issues. Nothing that I see today  worth biopsying. Ok to get sinus CT to rule out sinus disease I cannot see on scope.     Recommend start sinus regimen- rinses BID, flonase and astelin BID    Continue daily antihistamine OTC and singulair    Continue nexium    RTC: 3 months    Plan of care was discussed in detail with the patient, who agreed with the plan as above. All questions were answered in detail.     Anjali Glass MD  Otolaryngology

## 2022-11-04 PROBLEM — M30.1 EOSINOPHILIC GRANULOMATOSIS WITH POLYANGIITIS (EGPA): Status: ACTIVE | Noted: 2022-11-04

## 2022-11-04 PROBLEM — D72.18 EOSINOPHILIC GRANULOMATOSIS WITH POLYANGIITIS (EGPA): Status: ACTIVE | Noted: 2022-11-04

## 2022-11-07 NOTE — PROGRESS NOTES
"Subjective:       Patient ID: Chris Quiroz is a 72 y.o. male.    Chief Complaint: Annual Exam    HPI here for annual exam. States he is doing well. Due for routine labs. Good BP control. He has a lot of chronic issues to review. He went to [facility] recently for concerns regarding his chronic cough, SOB, etc. Was diagnosed with Eosinophilic granulomatosis with polyangiitis. Pulmonary fibrosis. He has since followed up with local pulmonology for treatment. States he is feeling better finally.     Pulmonology notes reviewed.     He is followed by Psychiatry for his anxiety, depression, alcohol abuse issues. History of cocaine abuse and alcoholism. Has had a recent relapse in ETOH use. He will continue to follow with psychiatry.     See ROS.  The following portion of the patients history was reviewed and updated as appropriate: allergies, current medications, past medical and surgical history. Past social history and problem list reviewed. Family PMH and Past social history reviewed. Tobacco, Illicit drug use reviewed.      Review of patient's allergies indicates:   Allergen Reactions    Dog dander Shortness Of Breath     Pt states " my lungs fill up and I cough"    Juniper Shortness Of Breath     States lungs fill up with fluid    Ragweed pollen      Other reaction(s): Cough    Nsaids (non-steroidal anti-inflammatory drug) Other (See Comments)     Gastric ulcers    Cat dander          Current Outpatient Medications:     acetaminophen (TYLENOL) 325 MG tablet, Take 2 tablets (650 mg total) by mouth every 6 (six) hours as needed for Pain., Disp: 60 tablet, Rfl: 0    albuterol-ipratropium (DUO-NEB) 2.5 mg-0.5 mg/3 mL nebulizer solution, TAKE 3 ML BY NEBULIZATION EVERY 6 HOURS AS NEEDED FOR WHEEZE RESCUE Strength: 0.5 MG-3 MG(2.5 mg base)/3 mL, Disp: 360 mL, Rfl: 3    azelastine (ASTELIN) 137 mcg (0.1 %) nasal spray, 1 spray (137 mcg total) by Nasal route 2 (two) times daily., Disp: 30 mL, Rfl: 11    " diphenhydrAMINE (BENADRYL) 25 mg capsule, Take 25 mg by mouth 2 (two) times daily as needed., Disp: , Rfl:     esomeprazole (NEXIUM) 40 MG capsule, Take 1 capsule by mouth once daily., Disp: , Rfl:     fluticasone propionate (FLONASE) 50 mcg/actuation nasal spray, 1 spray (50 mcg total) by Each Nostril route once daily., Disp: 18.2 mL, Rfl: 3    glucosamine-chondroitin 500-400 mg tablet, Take 1 tablet by mouth as needed. , Disp: , Rfl:     montelukast (SINGULAIR) 10 mg tablet, TAKE 1 TABLET BY MOUTH EVERY DAY IN THE EVENING, Disp: 30 tablet, Rfl: 6    predniSONE (DELTASONE) 20 MG tablet, Take 4 tablets (80 mg total) by mouth once daily., Disp: 120 tablet, Rfl: 3    rosuvastatin (CRESTOR) 10 MG tablet, Take 1 tablet (10 mg total) by mouth once daily., Disp: 90 tablet, Rfl: 3    sildenafiL (VIAGRA) 50 MG tablet, Take 50 mg by mouth daily as needed., Disp: , Rfl:     sulfamethoxazole-trimethoprim 400-80mg (BACTRIM,SEPTRA) 400-80 mg per tablet, Take 1 tablet by mouth once daily., Disp: 30 tablet, Rfl: 3    vortioxetine (TRINTELLIX) 10 mg Tab, Take 1 tablet (10 mg total) by mouth once daily., Disp: 90 tablet, Rfl: 3    albuterol (VENTOLIN HFA) 90 mcg/actuation inhaler, Inhale 2 puffs into the lungs every 6 (six) hours as needed for Wheezing. Rescue (Patient not taking: Reported on 11/8/2022), Disp: 18 g, Rfl: 1    melatonin 5 mg Tab, Take 2 mg by mouth nightly. , Disp: , Rfl:     Past Medical History:   Diagnosis Date    Anxiety     Anxiety     Atherosclerosis of coronary artery     per CT scan dated 7/13/18    Depression     Diverticulosis of intestine without bleeding     GERD (gastroesophageal reflux disease)     HTN (hypertension)     Hyperlipidemia     Hypertension     Plantar fasciitis     Thoracic aorta atherosclerosis     per CT dated 7/13/18. sent for scanning    Vitamin D deficiency     Vitamin D deficiency        Past Surgical History:   Procedure Laterality Date    COLONOSCOPY      ESOPHAGOGASTRODUODENOSCOPY  Left 12/7/2018    Procedure: EGD (ESOPHAGOGASTRODUODENOSCOPY);  Surgeon: Josiah Cuadra MD;  Location: [facility];  Service: Endoscopy;  Laterality: Left;    ESOPHAGOGASTRODUODENOSCOPY N/A 2/28/2021    Procedure: EGD (ESOPHAGOGASTRODUODENOSCOPY);  Surgeon: James Martines Jr., MD;  Location: [facility] ENDO;  Service: Endoscopy;  Laterality: N/A;    Right Hand Surgery      TONSILLECTOMY      UMBILICAL HERNIA REPAIR N/A 5/19/2021    Procedure: REPAIR, HERNIA, UMBILICAL WITH MESH;  Surgeon: Hardik Ruiz MD;  Location: [facility];  Service: General;  Laterality: N/A;       Social History     Socioeconomic History    Marital status:    Tobacco Use    Smoking status: Former     Packs/day: 2.00     Types: Cigarettes    Smokeless tobacco: Never    Tobacco comments:     Age Started: 22 Age Quit: 51   Substance and Sexual Activity    Alcohol use: Not Currently     Comment: once in a while    Drug use: No     Comment: Tried  CBD     Sexual activity: Never     Review of Systems   Constitutional:  Positive for fatigue. Negative for activity change, appetite change and fever.   HENT:  Negative for congestion, rhinorrhea and trouble swallowing.    Eyes:  Negative for visual disturbance.   Respiratory:  Positive for cough, shortness of breath and wheezing. Negative for chest tightness.         Improving with new treatment. Hopeful since new diagnosis by [facility].   Cardiovascular:  Negative for chest pain, palpitations and leg swelling.   Gastrointestinal:  Negative for abdominal pain, blood in stool, diarrhea, nausea and vomiting.   Genitourinary:  Negative for difficulty urinating.   Musculoskeletal:  Positive for arthralgias. Negative for back pain and gait problem.   Skin:  Negative for rash.   Neurological:  Negative for dizziness, weakness, light-headedness and headaches.   Hematological:  Negative for adenopathy. Does not bruise/bleed easily.   Psychiatric/Behavioral:  Negative for decreased concentration,  dysphoric mood and sleep disturbance. The patient is not nervous/anxious.      Objective:      /68   Pulse 85   Temp 98.4 °F (36.9 °C) (Temporal)   Resp 20   Ht 6' (1.829 m)   Wt 98.2 kg (216 lb 7.9 oz)   SpO2 96%   BMI 29.36 kg/m²      Physical Exam  Constitutional:       Appearance: He is well-developed. He is obese.   HENT:      Head: Normocephalic.   Eyes:      Conjunctiva/sclera: Conjunctivae normal.      Pupils: Pupils are equal, round, and reactive to light.   Neck:      Vascular: No carotid bruit.   Cardiovascular:      Rate and Rhythm: Normal rate and regular rhythm.      Pulses: Normal pulses.      Heart sounds: Normal heart sounds. No murmur heard.    No gallop.   Pulmonary:      Effort: Pulmonary effort is normal.      Breath sounds: Normal breath sounds.   Abdominal:      General: Bowel sounds are normal.      Palpations: Abdomen is soft.      Tenderness: There is no abdominal tenderness.   Musculoskeletal:         General: Normal range of motion.      Cervical back: Normal range of motion and neck supple.      Comments: Gait and coordination normal   Skin:     General: Skin is warm and dry.      Capillary Refill: Capillary refill takes less than 2 seconds.      Findings: No rash.   Neurological:      Mental Status: He is alert and oriented to person, place, and time.   Psychiatric:         Attention and Perception: Attention normal.         Mood and Affect: Mood and affect normal.         Speech: Speech normal.         Behavior: Behavior normal.       Assessment:       1. Annual physical exam    2. Primary hypertension    3. Mixed hyperlipidemia    4. Atherosclerosis of coronary artery, unspecified vessel or lesion type, unspecified whether angina present, unspecified whether native or transplanted heart    5. Prediabetes    6. Immunization due    7. Chronic obstructive pulmonary disease, unspecified COPD type    8. Eosinophilic granulomatosis with polyangiitis (EGPA)        Plan:        Annual physical exam    Primary hypertension: good BP control.     Mixed hyperlipidemia: tolerating statin. Due for labs.  -     Lipid Panel; Future; Expected date: 11/08/2022  -     Comprehensive Metabolic Panel; Future; Expected date: 11/08/2022    Atherosclerosis of coronary artery, unspecified vessel or lesion type, unspecified whether angina present, unspecified whether native or transplanted heart: followed by Cardiology.     Prediabetes: stable.      Lab Results   Component Value Date    HGBA1C 6.2 (H) 05/11/2021        Immunization due  -     (In Office Administered) Pneumococcal Conjugate Vaccine (20 Valent) (IM)    Chronic obstructive pulmonary disease, unspecified COPD type: continue to follow with Pulmonology.     Eosinophilic granulomatosis with polyangiitis (EGPA): continue to follow with Pulmonology.      Continue current medication  Take medications only as prescribed  Healthy diet, exercise  Adequate rest  Adequate hydration  Avoid allergens  Avoid excessive caffeine      Follow up 6 months.

## 2022-11-08 ENCOUNTER — OFFICE VISIT (OUTPATIENT)
Dept: FAMILY MEDICINE | Facility: CLINIC | Age: 72
End: 2022-11-08
Payer: MEDICARE

## 2022-11-08 VITALS
BODY MASS INDEX: 29.32 KG/M2 | RESPIRATION RATE: 20 BRPM | OXYGEN SATURATION: 96 % | HEIGHT: 72 IN | WEIGHT: 216.5 LBS | HEART RATE: 85 BPM | DIASTOLIC BLOOD PRESSURE: 68 MMHG | TEMPERATURE: 98 F | SYSTOLIC BLOOD PRESSURE: 138 MMHG

## 2022-11-08 DIAGNOSIS — Z23 IMMUNIZATION DUE: ICD-10-CM

## 2022-11-08 DIAGNOSIS — M30.1 EOSINOPHILIC GRANULOMATOSIS WITH POLYANGIITIS (EGPA): ICD-10-CM

## 2022-11-08 DIAGNOSIS — I25.10 ATHEROSCLEROSIS OF CORONARY ARTERY, UNSPECIFIED VESSEL OR LESION TYPE, UNSPECIFIED WHETHER ANGINA PRESENT, UNSPECIFIED WHETHER NATIVE OR TRANSPLANTED HEART: ICD-10-CM

## 2022-11-08 DIAGNOSIS — J44.9 CHRONIC OBSTRUCTIVE PULMONARY DISEASE, UNSPECIFIED COPD TYPE: ICD-10-CM

## 2022-11-08 DIAGNOSIS — R73.03 PREDIABETES: ICD-10-CM

## 2022-11-08 DIAGNOSIS — D72.18 EOSINOPHILIC GRANULOMATOSIS WITH POLYANGIITIS (EGPA): ICD-10-CM

## 2022-11-08 DIAGNOSIS — E78.2 MIXED HYPERLIPIDEMIA: ICD-10-CM

## 2022-11-08 DIAGNOSIS — I10 PRIMARY HYPERTENSION: ICD-10-CM

## 2022-11-08 DIAGNOSIS — Z00.00 ANNUAL PHYSICAL EXAM: Primary | ICD-10-CM

## 2022-11-08 PROBLEM — K92.2 UPPER GI BLEED: Status: RESOLVED | Noted: 2021-02-27 | Resolved: 2022-11-08

## 2022-11-08 PROBLEM — D64.9 ANEMIA: Status: RESOLVED | Noted: 2020-06-03 | Resolved: 2022-11-08

## 2022-11-08 PROBLEM — J18.9 COMMUNITY ACQUIRED PNEUMONIA OF RIGHT LOWER LOBE OF LUNG: Status: RESOLVED | Noted: 2018-12-06 | Resolved: 2022-11-08

## 2022-11-08 PROBLEM — K25.0 ACUTE GASTRIC ULCER WITH HEMORRHAGE: Status: RESOLVED | Noted: 2021-02-27 | Resolved: 2022-11-08

## 2022-11-08 PROBLEM — D12.6 BENIGN NEOPLASM OF COLON: Status: ACTIVE | Noted: 2022-11-08

## 2022-11-08 PROBLEM — M79.671 PAIN IN RIGHT FOOT: Status: RESOLVED | Noted: 2019-04-06 | Resolved: 2022-11-08

## 2022-11-08 PROCEDURE — 90677 PNEUMOCOCCAL CONJUGATE VACCINE 20-VALENT: ICD-10-PCS | Mod: S$GLB,,, | Performed by: NURSE PRACTITIONER

## 2022-11-08 PROCEDURE — G0009 ADMIN PNEUMOCOCCAL VACCINE: HCPCS | Mod: S$GLB,,, | Performed by: NURSE PRACTITIONER

## 2022-11-08 PROCEDURE — G0009 PNEUMOCOCCAL CONJUGATE VACCINE 20-VALENT: ICD-10-PCS | Mod: S$GLB,,, | Performed by: NURSE PRACTITIONER

## 2022-11-08 PROCEDURE — 99397 PER PM REEVAL EST PAT 65+ YR: CPT | Mod: GZ,S$GLB,, | Performed by: NURSE PRACTITIONER

## 2022-11-08 PROCEDURE — 90677 PCV20 VACCINE IM: CPT | Mod: S$GLB,,, | Performed by: NURSE PRACTITIONER

## 2022-11-08 PROCEDURE — 99397 PR PREVENTIVE VISIT,EST,65 & OVER: ICD-10-PCS | Mod: GZ,S$GLB,, | Performed by: NURSE PRACTITIONER

## 2023-01-01 ENCOUNTER — CLINICAL SUPPORT (OUTPATIENT)
Dept: DIABETES | Facility: CLINIC | Age: 73
End: 2023-01-01
Payer: MEDICARE

## 2023-01-01 ENCOUNTER — OFFICE VISIT (OUTPATIENT)
Dept: FAMILY MEDICINE | Facility: CLINIC | Age: 73
End: 2023-01-01
Payer: MEDICARE

## 2023-01-01 ENCOUNTER — CLINICAL SUPPORT (OUTPATIENT)
Dept: REHABILITATION | Facility: HOSPITAL | Age: 73
End: 2023-01-01
Payer: MEDICARE

## 2023-01-01 ENCOUNTER — PATIENT MESSAGE (OUTPATIENT)
Dept: FAMILY MEDICINE | Facility: CLINIC | Age: 73
End: 2023-01-01
Payer: MEDICARE

## 2023-01-01 ENCOUNTER — PATIENT OUTREACH (OUTPATIENT)
Dept: ADMINISTRATIVE | Facility: HOSPITAL | Age: 73
End: 2023-01-01
Payer: MEDICARE

## 2023-01-01 ENCOUNTER — TELEPHONE (OUTPATIENT)
Dept: OTOLARYNGOLOGY | Facility: CLINIC | Age: 73
End: 2023-01-01
Payer: MEDICARE

## 2023-01-01 ENCOUNTER — OFFICE VISIT (OUTPATIENT)
Dept: OTOLARYNGOLOGY | Facility: CLINIC | Age: 73
End: 2023-01-01
Payer: MEDICARE

## 2023-01-01 ENCOUNTER — PATIENT MESSAGE (OUTPATIENT)
Dept: OTOLARYNGOLOGY | Facility: CLINIC | Age: 73
End: 2023-01-01
Payer: MEDICARE

## 2023-01-01 ENCOUNTER — PATIENT MESSAGE (OUTPATIENT)
Dept: FAMILY MEDICINE | Facility: CLINIC | Age: 73
End: 2023-01-01

## 2023-01-01 ENCOUNTER — NUTRITION (OUTPATIENT)
Dept: DIABETES | Facility: CLINIC | Age: 73
End: 2023-01-01
Payer: MEDICARE

## 2023-01-01 ENCOUNTER — OFFICE VISIT (OUTPATIENT)
Dept: ENDOCRINOLOGY | Facility: CLINIC | Age: 73
End: 2023-01-01
Payer: MEDICARE

## 2023-01-01 ENCOUNTER — TELEPHONE (OUTPATIENT)
Dept: ENDOCRINOLOGY | Facility: CLINIC | Age: 73
End: 2023-01-01
Payer: MEDICARE

## 2023-01-01 ENCOUNTER — TELEPHONE (OUTPATIENT)
Dept: FAMILY MEDICINE | Facility: CLINIC | Age: 73
End: 2023-01-01

## 2023-01-01 ENCOUNTER — DOCUMENTATION ONLY (OUTPATIENT)
Dept: REHABILITATION | Facility: HOSPITAL | Age: 73
End: 2023-01-01
Payer: MEDICARE

## 2023-01-01 ENCOUNTER — TELEPHONE (OUTPATIENT)
Dept: FAMILY MEDICINE | Facility: CLINIC | Age: 73
End: 2023-01-01
Payer: MEDICARE

## 2023-01-01 ENCOUNTER — TELEPHONE (OUTPATIENT)
Dept: PSYCHIATRY | Facility: CLINIC | Age: 73
End: 2023-01-01
Payer: MEDICARE

## 2023-01-01 ENCOUNTER — LAB VISIT (OUTPATIENT)
Dept: LAB | Facility: HOSPITAL | Age: 73
End: 2023-01-01
Payer: MEDICARE

## 2023-01-01 VITALS
BODY MASS INDEX: 25.23 KG/M2 | SYSTOLIC BLOOD PRESSURE: 120 MMHG | HEIGHT: 70 IN | WEIGHT: 176.25 LBS | HEART RATE: 108 BPM | DIASTOLIC BLOOD PRESSURE: 66 MMHG

## 2023-01-01 VITALS
TEMPERATURE: 98 F | OXYGEN SATURATION: 97 % | BODY MASS INDEX: 26.18 KG/M2 | WEIGHT: 182.88 LBS | HEART RATE: 104 BPM | DIASTOLIC BLOOD PRESSURE: 68 MMHG | HEIGHT: 70 IN | RESPIRATION RATE: 16 BRPM | SYSTOLIC BLOOD PRESSURE: 110 MMHG

## 2023-01-01 VITALS — BODY MASS INDEX: 27.5 KG/M2 | WEIGHT: 203.06 LBS | HEIGHT: 72 IN

## 2023-01-01 VITALS
TEMPERATURE: 98 F | SYSTOLIC BLOOD PRESSURE: 122 MMHG | HEART RATE: 88 BPM | RESPIRATION RATE: 16 BRPM | WEIGHT: 183.75 LBS | OXYGEN SATURATION: 97 % | DIASTOLIC BLOOD PRESSURE: 68 MMHG | BODY MASS INDEX: 26.31 KG/M2 | HEIGHT: 70 IN

## 2023-01-01 VITALS
DIASTOLIC BLOOD PRESSURE: 68 MMHG | BODY MASS INDEX: 27.19 KG/M2 | SYSTOLIC BLOOD PRESSURE: 120 MMHG | RESPIRATION RATE: 20 BRPM | WEIGHT: 189.94 LBS | TEMPERATURE: 98 F | HEART RATE: 92 BPM | OXYGEN SATURATION: 97 % | HEIGHT: 70 IN

## 2023-01-01 VITALS
TEMPERATURE: 98 F | RESPIRATION RATE: 20 BRPM | OXYGEN SATURATION: 97 % | HEART RATE: 111 BPM | BODY MASS INDEX: 26.31 KG/M2 | HEIGHT: 70 IN | SYSTOLIC BLOOD PRESSURE: 112 MMHG | DIASTOLIC BLOOD PRESSURE: 70 MMHG | WEIGHT: 183.75 LBS

## 2023-01-01 VITALS
OXYGEN SATURATION: 97 % | TEMPERATURE: 98 F | WEIGHT: 179.38 LBS | HEIGHT: 70 IN | DIASTOLIC BLOOD PRESSURE: 76 MMHG | SYSTOLIC BLOOD PRESSURE: 110 MMHG | BODY MASS INDEX: 25.68 KG/M2

## 2023-01-01 VITALS
WEIGHT: 185.94 LBS | RESPIRATION RATE: 20 BRPM | SYSTOLIC BLOOD PRESSURE: 112 MMHG | DIASTOLIC BLOOD PRESSURE: 62 MMHG | HEART RATE: 97 BPM | TEMPERATURE: 98 F | HEIGHT: 70 IN | BODY MASS INDEX: 26.62 KG/M2 | OXYGEN SATURATION: 96 %

## 2023-01-01 VITALS — BODY MASS INDEX: 25.14 KG/M2 | WEIGHT: 185.63 LBS | HEIGHT: 72 IN

## 2023-01-01 VITALS — BODY MASS INDEX: 26.08 KG/M2 | WEIGHT: 182.19 LBS | HEIGHT: 70 IN

## 2023-01-01 VITALS — HEIGHT: 70 IN | BODY MASS INDEX: 26.82 KG/M2 | WEIGHT: 187.38 LBS

## 2023-01-01 DIAGNOSIS — D84.821 DRUG-INDUCED IMMUNODEFICIENCY: ICD-10-CM

## 2023-01-01 DIAGNOSIS — D72.18 EOSINOPHILIC GRANULOMATOSIS WITH POLYANGIITIS (EGPA): ICD-10-CM

## 2023-01-01 DIAGNOSIS — J44.9 CHRONIC OBSTRUCTIVE PULMONARY DISEASE, UNSPECIFIED COPD TYPE: ICD-10-CM

## 2023-01-01 DIAGNOSIS — R26.89 IMPAIRMENT OF BALANCE: ICD-10-CM

## 2023-01-01 DIAGNOSIS — R26.81 GAIT INSTABILITY: ICD-10-CM

## 2023-01-01 DIAGNOSIS — E78.2 MIXED HYPERLIPIDEMIA: ICD-10-CM

## 2023-01-01 DIAGNOSIS — I50.33 ACUTE ON CHRONIC DIASTOLIC CHF (CONGESTIVE HEART FAILURE): Primary | ICD-10-CM

## 2023-01-01 DIAGNOSIS — R29.898 LEG WEAKNESS, BILATERAL: ICD-10-CM

## 2023-01-01 DIAGNOSIS — M30.1 EOSINOPHILIC GRANULOMATOSIS WITH POLYANGIITIS (EGPA): ICD-10-CM

## 2023-01-01 DIAGNOSIS — R10.13 EPIGASTRIC PAIN: Primary | ICD-10-CM

## 2023-01-01 DIAGNOSIS — Z79.899 DRUG-INDUCED IMMUNODEFICIENCY: ICD-10-CM

## 2023-01-01 DIAGNOSIS — E11.8 DIABETES MELLITUS TYPE 2 WITH COMPLICATIONS: Primary | ICD-10-CM

## 2023-01-01 DIAGNOSIS — I25.10 ATHEROSCLEROSIS OF CORONARY ARTERY, UNSPECIFIED VESSEL OR LESION TYPE, UNSPECIFIED WHETHER ANGINA PRESENT, UNSPECIFIED WHETHER NATIVE OR TRANSPLANTED HEART: ICD-10-CM

## 2023-01-01 DIAGNOSIS — R26.81 GAIT INSTABILITY: Primary | ICD-10-CM

## 2023-01-01 DIAGNOSIS — I27.29 PULMONARY HYPERTENSION ASSOCIATED WITH VASCULITIS: ICD-10-CM

## 2023-01-01 DIAGNOSIS — R13.10 DYSPHAGIA, UNSPECIFIED TYPE: ICD-10-CM

## 2023-01-01 DIAGNOSIS — R29.6 FREQUENT FALLS: ICD-10-CM

## 2023-01-01 DIAGNOSIS — F33.1 MODERATE EPISODE OF RECURRENT MAJOR DEPRESSIVE DISORDER: ICD-10-CM

## 2023-01-01 DIAGNOSIS — Z74.09 DECREASED MOBILITY AND ENDURANCE: ICD-10-CM

## 2023-01-01 DIAGNOSIS — D72.18 EOSINOPHILIC GRANULOMATOSIS WITH POLYANGIITIS (EGPA): Primary | ICD-10-CM

## 2023-01-01 DIAGNOSIS — T38.0X5A STEROID-INDUCED DIABETES MELLITUS, INITIAL ENCOUNTER: ICD-10-CM

## 2023-01-01 DIAGNOSIS — R73.03 PREDIABETES: ICD-10-CM

## 2023-01-01 DIAGNOSIS — R13.13 PHARYNGEAL DYSPHAGIA: ICD-10-CM

## 2023-01-01 DIAGNOSIS — I10 PRIMARY HYPERTENSION: ICD-10-CM

## 2023-01-01 DIAGNOSIS — I10 PRIMARY HYPERTENSION: Primary | ICD-10-CM

## 2023-01-01 DIAGNOSIS — I63.9 CEREBROVASCULAR ACCIDENT (CVA), UNSPECIFIED MECHANISM: ICD-10-CM

## 2023-01-01 DIAGNOSIS — E11.65 TYPE 2 DIABETES MELLITUS WITH HYPERGLYCEMIA, WITHOUT LONG-TERM CURRENT USE OF INSULIN: Primary | ICD-10-CM

## 2023-01-01 DIAGNOSIS — K21.9 LARYNGOPHARYNGEAL REFLUX (LPR): ICD-10-CM

## 2023-01-01 DIAGNOSIS — E11.65 TYPE 2 DIABETES MELLITUS WITH HYPERGLYCEMIA, WITHOUT LONG-TERM CURRENT USE OF INSULIN: ICD-10-CM

## 2023-01-01 DIAGNOSIS — T38.0X5A STEROID-INDUCED DIABETES MELLITUS, INITIAL ENCOUNTER: Primary | ICD-10-CM

## 2023-01-01 DIAGNOSIS — M30.1 EOSINOPHILIC GRANULOMATOSIS WITH POLYANGIITIS (EGPA): Primary | ICD-10-CM

## 2023-01-01 DIAGNOSIS — J34.2 DNS (DEVIATED NASAL SEPTUM): ICD-10-CM

## 2023-01-01 DIAGNOSIS — E11.8 DIABETES MELLITUS TYPE 2 WITH COMPLICATIONS: ICD-10-CM

## 2023-01-01 DIAGNOSIS — R73.03 PREDIABETES: Primary | ICD-10-CM

## 2023-01-01 DIAGNOSIS — I77.6 PULMONARY HYPERTENSION ASSOCIATED WITH VASCULITIS: ICD-10-CM

## 2023-01-01 DIAGNOSIS — R41.89 COGNITIVE CHANGES: ICD-10-CM

## 2023-01-01 DIAGNOSIS — F10.21 ALCOHOL USE DISORDER, MODERATE, IN SUSTAINED REMISSION: ICD-10-CM

## 2023-01-01 DIAGNOSIS — D69.6 THROMBOCYTOPENIA, UNSPECIFIED: ICD-10-CM

## 2023-01-01 DIAGNOSIS — E09.9 STEROID-INDUCED DIABETES MELLITUS, INITIAL ENCOUNTER: Primary | ICD-10-CM

## 2023-01-01 DIAGNOSIS — E09.9 STEROID-INDUCED DIABETES MELLITUS, INITIAL ENCOUNTER: ICD-10-CM

## 2023-01-01 DIAGNOSIS — T14.8XXA SKIN ABRASION: ICD-10-CM

## 2023-01-01 DIAGNOSIS — F41.9 ANXIETY: ICD-10-CM

## 2023-01-01 DIAGNOSIS — B37.0 OROPHARYNGEAL CANDIDIASIS: Primary | ICD-10-CM

## 2023-01-01 DIAGNOSIS — Z12.5 PROSTATE CANCER SCREENING: ICD-10-CM

## 2023-01-01 DIAGNOSIS — Z78.9 STATIN INTOLERANCE: ICD-10-CM

## 2023-01-01 DIAGNOSIS — Z87.11 HISTORY OF PEPTIC ULCER: ICD-10-CM

## 2023-01-01 DIAGNOSIS — J30.9 ALLERGIC SINUSITIS: ICD-10-CM

## 2023-01-01 DIAGNOSIS — I51.89 DIASTOLIC DYSFUNCTION: Primary | ICD-10-CM

## 2023-01-01 DIAGNOSIS — D72.110 IDIOPATHIC HYPEREOSINOPHILIC SYNDROME: ICD-10-CM

## 2023-01-01 LAB
ALBUMIN SERPL BCP-MCNC: 3 G/DL (ref 3.5–5.2)
ALBUMIN SERPL BCP-MCNC: 3.4 G/DL (ref 3.5–5.2)
ALP SERPL-CCNC: 63 U/L (ref 55–135)
ALP SERPL-CCNC: 89 U/L (ref 55–135)
ALT SERPL W/O P-5'-P-CCNC: 26 U/L (ref 10–44)
ALT SERPL W/O P-5'-P-CCNC: 61 U/L (ref 10–44)
ANION GAP SERPL CALC-SCNC: 10 MMOL/L (ref 8–16)
ANION GAP SERPL CALC-SCNC: 10 MMOL/L (ref 8–16)
AST SERPL-CCNC: 15 U/L (ref 10–40)
AST SERPL-CCNC: 27 U/L (ref 10–40)
BASOPHILS # BLD AUTO: 0.08 K/UL (ref 0–0.2)
BASOPHILS NFR BLD: 0.8 % (ref 0–1.9)
BILIRUB SERPL-MCNC: 1.2 MG/DL (ref 0.1–1)
BILIRUB SERPL-MCNC: 1.2 MG/DL (ref 0.1–1)
BUN SERPL-MCNC: 16 MG/DL (ref 8–23)
BUN SERPL-MCNC: 23 MG/DL (ref 8–23)
CALCIUM SERPL-MCNC: 8.9 MG/DL (ref 8.7–10.5)
CALCIUM SERPL-MCNC: 9 MG/DL (ref 8.7–10.5)
CHLORIDE SERPL-SCNC: 100 MMOL/L (ref 95–110)
CHLORIDE SERPL-SCNC: 104 MMOL/L (ref 95–110)
CO2 SERPL-SCNC: 26 MMOL/L (ref 23–29)
CO2 SERPL-SCNC: 30 MMOL/L (ref 23–29)
COMPLEXED PSA SERPL-MCNC: 0.42 NG/ML (ref 0–4)
CREAT SERPL-MCNC: 0.8 MG/DL (ref 0.5–1.4)
CREAT SERPL-MCNC: 0.9 MG/DL (ref 0.5–1.4)
DIFFERENTIAL METHOD: ABNORMAL
EOSINOPHIL # BLD AUTO: 0 K/UL (ref 0–0.5)
EOSINOPHIL NFR BLD: 0.4 % (ref 0–8)
ERYTHROCYTE [DISTWIDTH] IN BLOOD BY AUTOMATED COUNT: 14.3 % (ref 11.5–14.5)
EST. GFR  (NO RACE VARIABLE): >60 ML/MIN/1.73 M^2
EST. GFR  (NO RACE VARIABLE): >60 ML/MIN/1.73 M^2
ESTIMATED AVG GLUCOSE: 235 MG/DL (ref 68–131)
ESTIMATED AVG GLUCOSE: ABNORMAL MG/DL (ref 68–131)
GLUCOSE SERPL-MCNC: 161 MG/DL (ref 70–110)
GLUCOSE SERPL-MCNC: 281 MG/DL (ref 70–110)
GLUCOSE SERPL-MCNC: 364 MG/DL (ref 70–110)
HBA1C MFR BLD: 9.8 % (ref 4–5.6)
HBA1C MFR BLD: >14 % (ref 4–5.6)
HCT VFR BLD AUTO: 44.2 % (ref 40–54)
HGB BLD-MCNC: 14.1 G/DL (ref 14–18)
IMM GRANULOCYTES # BLD AUTO: 0.37 K/UL (ref 0–0.04)
IMM GRANULOCYTES NFR BLD AUTO: 3.5 % (ref 0–0.5)
LYMPHOCYTES # BLD AUTO: 2.2 K/UL (ref 1–4.8)
LYMPHOCYTES NFR BLD: 21.2 % (ref 18–48)
MCH RBC QN AUTO: 30.3 PG (ref 27–31)
MCHC RBC AUTO-ENTMCNC: 31.9 G/DL (ref 32–36)
MCV RBC AUTO: 95 FL (ref 82–98)
MONOCYTES # BLD AUTO: 0.6 K/UL (ref 0.3–1)
MONOCYTES NFR BLD: 5.6 % (ref 4–15)
NEUTROPHILS # BLD AUTO: 7.2 K/UL (ref 1.8–7.7)
NEUTROPHILS NFR BLD: 68.5 % (ref 38–73)
NRBC BLD-RTO: 0 /100 WBC
PLATELET # BLD AUTO: 214 K/UL (ref 150–450)
PMV BLD AUTO: 11.4 FL (ref 9.2–12.9)
POTASSIUM SERPL-SCNC: 3.9 MMOL/L (ref 3.5–5.1)
POTASSIUM SERPL-SCNC: 5.3 MMOL/L (ref 3.5–5.1)
PROT SERPL-MCNC: 5.7 G/DL (ref 6–8.4)
PROT SERPL-MCNC: 5.8 G/DL (ref 6–8.4)
RBC # BLD AUTO: 4.65 M/UL (ref 4.6–6.2)
SODIUM SERPL-SCNC: 140 MMOL/L (ref 136–145)
SODIUM SERPL-SCNC: 140 MMOL/L (ref 136–145)
WBC # BLD AUTO: 10.48 K/UL (ref 3.9–12.7)

## 2023-01-01 PROCEDURE — 99999 PR PBB SHADOW E&M-EST. PATIENT-LVL II: CPT | Mod: PBBFAC,,, | Performed by: DIETITIAN, REGISTERED

## 2023-01-01 PROCEDURE — 82962 POCT GLUCOSE, HAND-HELD DEVICE: ICD-10-PCS | Mod: ,,, | Performed by: NURSE PRACTITIONER

## 2023-01-01 PROCEDURE — 99213 PR OFFICE/OUTPT VISIT, EST, LEVL III, 20-29 MIN: ICD-10-PCS | Mod: S$GLB,,, | Performed by: NURSE PRACTITIONER

## 2023-01-01 PROCEDURE — 85025 COMPLETE CBC W/AUTO DIFF WBC: CPT | Performed by: EMERGENCY MEDICINE

## 2023-01-01 PROCEDURE — 99214 OFFICE O/P EST MOD 30 MIN: CPT | Mod: S$GLB,,, | Performed by: NURSE PRACTITIONER

## 2023-01-01 PROCEDURE — 83036 HEMOGLOBIN GLYCOSYLATED A1C: CPT | Performed by: NURSE PRACTITIONER

## 2023-01-01 PROCEDURE — 99213 OFFICE O/P EST LOW 20 MIN: CPT | Mod: S$GLB,,, | Performed by: NURSE PRACTITIONER

## 2023-01-01 PROCEDURE — 36415 PR COLLECTION VENOUS BLOOD,VENIPUNCTURE: ICD-10-PCS | Mod: S$GLB,,, | Performed by: NURSE PRACTITIONER

## 2023-01-01 PROCEDURE — 99214 OFFICE O/P EST MOD 30 MIN: CPT | Mod: PBBFAC,PO | Performed by: STUDENT IN AN ORGANIZED HEALTH CARE EDUCATION/TRAINING PROGRAM

## 2023-01-01 PROCEDURE — 97112 NEUROMUSCULAR REEDUCATION: CPT | Mod: PN,CQ

## 2023-01-01 PROCEDURE — 99999 PR PBB SHADOW E&M-EST. PATIENT-LVL II: ICD-10-PCS | Mod: PBBFAC,,, | Performed by: DIETITIAN, REGISTERED

## 2023-01-01 PROCEDURE — 97110 THERAPEUTIC EXERCISES: CPT | Mod: PN,CQ

## 2023-01-01 PROCEDURE — 36415 COLL VENOUS BLD VENIPUNCTURE: CPT | Mod: PO | Performed by: EMERGENCY MEDICINE

## 2023-01-01 PROCEDURE — 31575 DIAGNOSTIC LARYNGOSCOPY: CPT | Mod: PBBFAC,PO | Performed by: STUDENT IN AN ORGANIZED HEALTH CARE EDUCATION/TRAINING PROGRAM

## 2023-01-01 PROCEDURE — 99214 PR OFFICE/OUTPT VISIT, EST, LEVL IV, 30-39 MIN: ICD-10-PCS | Mod: S$PBB,,, | Performed by: STUDENT IN AN ORGANIZED HEALTH CARE EDUCATION/TRAINING PROGRAM

## 2023-01-01 PROCEDURE — 99999 PR PBB SHADOW E&M-EST. PATIENT-LVL IV: CPT | Mod: PBBFAC,,, | Performed by: STUDENT IN AN ORGANIZED HEALTH CARE EDUCATION/TRAINING PROGRAM

## 2023-01-01 PROCEDURE — 99214 PR OFFICE/OUTPT VISIT, EST, LEVL IV, 30-39 MIN: ICD-10-PCS | Mod: S$GLB,,, | Performed by: NURSE PRACTITIONER

## 2023-01-01 PROCEDURE — 99212 OFFICE O/P EST SF 10 MIN: CPT | Mod: PBBFAC,PO | Performed by: DIETITIAN, REGISTERED

## 2023-01-01 PROCEDURE — 99214 OFFICE O/P EST MOD 30 MIN: CPT | Mod: S$PBB,,, | Performed by: STUDENT IN AN ORGANIZED HEALTH CARE EDUCATION/TRAINING PROGRAM

## 2023-01-01 PROCEDURE — 97112 NEUROMUSCULAR REEDUCATION: CPT | Mod: PN

## 2023-01-01 PROCEDURE — G0108 DIAB MANAGE TRN  PER INDIV: HCPCS | Mod: PBBFAC,PO | Performed by: DIETITIAN, REGISTERED

## 2023-01-01 PROCEDURE — 31575 DIAGNOSTIC LARYNGOSCOPY: CPT | Mod: S$PBB,,, | Performed by: STUDENT IN AN ORGANIZED HEALTH CARE EDUCATION/TRAINING PROGRAM

## 2023-01-01 PROCEDURE — 99214 PR OFFICE/OUTPT VISIT, EST, LEVL IV, 30-39 MIN: ICD-10-PCS | Mod: 25,S$PBB,, | Performed by: STUDENT IN AN ORGANIZED HEALTH CARE EDUCATION/TRAINING PROGRAM

## 2023-01-01 PROCEDURE — 80053 COMPREHEN METABOLIC PANEL: CPT | Performed by: EMERGENCY MEDICINE

## 2023-01-01 PROCEDURE — 97110 THERAPEUTIC EXERCISES: CPT | Mod: PN

## 2023-01-01 PROCEDURE — 99999 PR PBB SHADOW E&M-EST. PATIENT-LVL IV: ICD-10-PCS | Mod: PBBFAC,,, | Performed by: STUDENT IN AN ORGANIZED HEALTH CARE EDUCATION/TRAINING PROGRAM

## 2023-01-01 PROCEDURE — 84153 ASSAY OF PSA TOTAL: CPT | Performed by: NURSE PRACTITIONER

## 2023-01-01 PROCEDURE — 99211 OFF/OP EST MAY X REQ PHY/QHP: CPT | Mod: PBBFAC,PO | Performed by: DIETITIAN, REGISTERED

## 2023-01-01 PROCEDURE — 99999 PR PBB SHADOW E&M-EST. PATIENT-LVL I: CPT | Mod: PBBFAC,,, | Performed by: DIETITIAN, REGISTERED

## 2023-01-01 PROCEDURE — 80053 COMPREHEN METABOLIC PANEL: CPT | Performed by: NURSE PRACTITIONER

## 2023-01-01 PROCEDURE — 36415 COLL VENOUS BLD VENIPUNCTURE: CPT | Mod: S$GLB,,, | Performed by: NURSE PRACTITIONER

## 2023-01-01 PROCEDURE — 99999 PR PBB SHADOW E&M-EST. PATIENT-LVL I: ICD-10-PCS | Mod: PBBFAC,,, | Performed by: DIETITIAN, REGISTERED

## 2023-01-01 PROCEDURE — 31575 PR LARYNGOSCOPY, FLEXIBLE; DIAGNOSTIC: ICD-10-PCS | Mod: S$PBB,,, | Performed by: STUDENT IN AN ORGANIZED HEALTH CARE EDUCATION/TRAINING PROGRAM

## 2023-01-01 PROCEDURE — 99214 OFFICE O/P EST MOD 30 MIN: CPT | Mod: 25,S$PBB,, | Performed by: STUDENT IN AN ORGANIZED HEALTH CARE EDUCATION/TRAINING PROGRAM

## 2023-01-01 PROCEDURE — 82962 GLUCOSE BLOOD TEST: CPT | Mod: ,,, | Performed by: NURSE PRACTITIONER

## 2023-01-01 PROCEDURE — 97162 PT EVAL MOD COMPLEX 30 MIN: CPT | Mod: PN

## 2023-01-01 RX ORDER — FAMOTIDINE 40 MG/1
40 TABLET, FILM COATED ORAL NIGHTLY
Qty: 90 TABLET | Refills: 3 | Status: SHIPPED | OUTPATIENT
Start: 2023-01-01 | End: 2023-01-01 | Stop reason: ALTCHOICE

## 2023-01-01 RX ORDER — INSULIN PUMP SYRINGE, 3 ML
EACH MISCELLANEOUS
Qty: 1 EACH | Refills: 0 | Status: ON HOLD | OUTPATIENT
Start: 2023-01-01 | End: 2023-01-01 | Stop reason: HOSPADM

## 2023-01-01 RX ORDER — PEN NEEDLE, DIABETIC 30 GX3/16"
NEEDLE, DISPOSABLE MISCELLANEOUS
Qty: 150 EACH | Refills: 6 | Status: ON HOLD | OUTPATIENT
Start: 2023-01-01 | End: 2023-01-01 | Stop reason: HOSPADM

## 2023-01-01 RX ORDER — MUPIROCIN 20 MG/G
OINTMENT TOPICAL 2 TIMES DAILY
Qty: 30 G | Refills: 2 | Status: SHIPPED | OUTPATIENT
Start: 2023-01-01

## 2023-01-01 RX ORDER — BLOOD-GLUCOSE SENSOR
EACH MISCELLANEOUS
Qty: 9 EACH | Refills: 4 | Status: ON HOLD | OUTPATIENT
Start: 2023-01-01 | End: 2023-01-01 | Stop reason: HOSPADM

## 2023-01-01 RX ORDER — LINAGLIPTIN 5 MG/1
5 TABLET, FILM COATED ORAL DAILY
Qty: 90 TABLET | Refills: 2 | Status: SHIPPED | OUTPATIENT
Start: 2023-01-01 | End: 2024-05-24

## 2023-01-01 RX ORDER — NYSTATIN 100000 [USP'U]/ML
4 SUSPENSION ORAL 4 TIMES DAILY
Qty: 160 ML | Refills: 0 | Status: SHIPPED | OUTPATIENT
Start: 2023-01-01 | End: 2023-01-01

## 2023-01-01 RX ORDER — AMOXICILLIN 500 MG/1
500 CAPSULE ORAL EVERY 12 HOURS
Qty: 20 CAPSULE | Refills: 0 | Status: SHIPPED | OUTPATIENT
Start: 2023-01-01 | End: 2023-01-01 | Stop reason: ALTCHOICE

## 2023-01-01 RX ORDER — FLASH GLUCOSE SCANNING READER
1 EACH MISCELLANEOUS CONTINUOUS
Qty: 1 EACH | Refills: 0 | Status: ON HOLD | OUTPATIENT
Start: 2023-01-01 | End: 2023-01-01 | Stop reason: HOSPADM

## 2023-01-01 RX ORDER — COVID-19 MOLECULAR TEST ASSAY
KIT MISCELLANEOUS
COMMUNITY
Start: 2022-01-01 | End: 2023-01-01

## 2023-01-01 RX ORDER — FLASH GLUCOSE SENSOR
1 KIT MISCELLANEOUS
Qty: 2 KIT | Refills: 6 | Status: ON HOLD | OUTPATIENT
Start: 2023-01-01 | End: 2023-01-01 | Stop reason: HOSPADM

## 2023-01-01 RX ORDER — INSULIN ASPART 100 [IU]/ML
INJECTION, SOLUTION INTRAVENOUS; SUBCUTANEOUS
Qty: 15 ML | Refills: 6 | Status: SHIPPED | OUTPATIENT
Start: 2023-01-01 | End: 2023-01-01 | Stop reason: SDUPTHER

## 2023-01-01 RX ORDER — NYSTATIN 100000 [USP'U]/ML
4 SUSPENSION ORAL 4 TIMES DAILY
Qty: 473 ML | Refills: 1 | Status: SHIPPED | OUTPATIENT
Start: 2023-01-01 | End: 2023-01-01

## 2023-01-01 RX ORDER — LEVOCETIRIZINE DIHYDROCHLORIDE 5 MG/1
5 TABLET, FILM COATED ORAL NIGHTLY
Qty: 90 TABLET | Refills: 3 | Status: SHIPPED | OUTPATIENT
Start: 2023-01-01 | End: 2023-01-01 | Stop reason: ALTCHOICE

## 2023-01-01 RX ORDER — INSULIN LISPRO 100 [IU]/ML
INJECTION, SOLUTION INTRAVENOUS; SUBCUTANEOUS
Qty: 15 ML | Refills: 6 | Status: SHIPPED | OUTPATIENT
Start: 2023-01-01 | End: 2023-01-01

## 2023-01-01 RX ORDER — BLOOD-GLUCOSE,RECEIVER,CONT
EACH MISCELLANEOUS
Qty: 1 EACH | Refills: 0 | Status: ON HOLD | OUTPATIENT
Start: 2023-01-01 | End: 2023-01-01 | Stop reason: HOSPADM

## 2023-01-01 RX ORDER — FUROSEMIDE 20 MG/1
20 TABLET ORAL DAILY PRN
Qty: 60 TABLET | Refills: 2 | Status: SHIPPED | OUTPATIENT
Start: 2023-01-01 | End: 2023-01-01 | Stop reason: ALTCHOICE

## 2023-02-02 NOTE — PATIENT INSTRUCTIONS
Thrush today likely worsening symptoms with phlegm and reflux but issues prior. Nystatin, plus xyzal plus pepcid added today.       No evidence of vascular or granulomatous disease on scope in Novemeber 2022 in nose or larynx. Has had positive allergy testing in past so suspect this is more allergic rhinitis, sinusitis issues.      Continue sinus regimen- rinses BID, flonase and astelin BID. Can get can of saline if not able to keep up with rinses. Keep sprays where resting during day    Add daily antihistamine and keep singulair.    Continue nexium, add pepcid at night.

## 2023-02-02 NOTE — PROGRESS NOTES
"  Otolaryngology Clinic Note    Subjective:       Patient ID: Chris Quiroz is a 72 y.o. male.    Chief Complaint: Follow-up and Sinus Problem      2/2/23: RTC. Doing sinus rinses and sprays when physically able, not daily on full regimen. Having difficulty getting around, walking, standing up.   Getting Nucala injections with pulm. Still taking prednisone 80 mg daily and daily bactrim and nexium.   Has cup of phlegm with him. Coating his throat and mouth. Trouble with drinking water. Worse past week. Phlegm catches pill in throat and causes pain, then more pain in stomach. Usually does not have issues with weather changes. Has had issues with nucala he thinks.     11/2/22  History of Present Illness: Chris Quiroz is a 72 y.o. male presenting with recent workup at [facility] for chronic cough, swallowing issue, dyspnea. 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 Was a former smoker, quit 2001.   Sinus: headaches, PND, coughing phlegm, congestion. He is currently on bactrim for 30 days and steroids per pulmonologist. He has had prior allergy shots without effect, stopped recently was on for less than a year. (Thinks it was a bit of everything, definitely cats.) Has tried flonase, thinks he has tried other sprays. Saline. Has tried daily antihistamine. Now PRN. Taking singulair for past year. Sputum is greyish. No time of the year is worse.   Denies nosebleeds or overdrying. No hyposmia.  Denies swallowing issues. Had normal swallow study. Coughing unrelated to swallowing.   Coughing is day and night, less so now with nebs, has helped with cough and wheezing.     Pulm note: 10/26/22: "Today the patient reports that he is feeling terrible.  Reports that he has had a chronic history of cough productive of gray/green sputum that started approximately 8 years ago with associated shortness of breath, wheezing.  He says that in the past he has been treated for allergies with " allergy shots, nasal sprays, and Breo inhaler but this did not improve his symptoms.  He also had intermittent courses of oral corticosteroids which he says also have not really helped with his symptoms.  He endorses shortness of breath especially with exertion, says that he can  currently walk  feet before having to stop and catch his breath and this has been getting progressively worse.   He is a former smoker quit in 2001, smoked 2 packs per day for approximately 30 years.  No other use of drugs, vaping, marijuana.  He has a dog at home no other pets.  Works in a theater.  Denies any history to mold exposure reports he does have some autoimmune disease that runs in the family but is unsure of a particular diagnosis.  He endorses a 40 lb weight loss over the past 4-5 months because he says whenever he tries to eat he has a coughing spell. Prior CT scan in 2020 showing subpleural reticulation/fibrosis without honeycombing worse on the right with some associated traction bronchiectasis.      He did not have a formal workup for ILD until recently when he went to [facility] 1 month ago for another opinion.  Patient had extensive workup which was significant for a p-ANCA positivity, IgE level 1869, peripheral eosinophilia, elevated inflammatory markers, elevated exhaled nitric oxide.  Also had a swallow study with normal findings but a lateral pharyngeal pouch.  Evaluated by ENT with no concerns for aspiration.  Had a 6 minute walk test with desaturation to 85%.  Overnight oximetry with severe sleep disorder breathing.  TTE showed grade 1/3 left ventricular diastolic dysfunction, elevated mean arterial pressure estimated at 28.  A repeat CT done on 09/20/2022 showed fibrotic appearing opacities with CT pattern indeterminate for UIP with mild air trapping and peribronchovascular extension.  Right lung with greater involvement than left.  A presumptive diagnosis of EGPA was made at [facility] with recommendations  "for further evaluation with repeat ANCA profile to include MPO and PR3 testing as well as CT sinus with referral to ENT for possible sinus biopsy and consideration of bronchoscopy with BAL and transbronchial biopsy."      Past Surgical History:   Procedure Laterality Date    COLONOSCOPY      ESOPHAGOGASTRODUODENOSCOPY Left 12/7/2018    Procedure: EGD (ESOPHAGOGASTRODUODENOSCOPY);  Surgeon: Josiah Cuadra MD;  Location: [facility];  Service: Endoscopy;  Laterality: Left;    ESOPHAGOGASTRODUODENOSCOPY N/A 2/28/2021    Procedure: EGD (ESOPHAGOGASTRODUODENOSCOPY);  Surgeon: James Martines Jr., MD;  Location: [facility];  Service: Endoscopy;  Laterality: N/A;    Right Hand Surgery      TONSILLECTOMY      UMBILICAL HERNIA REPAIR N/A 5/19/2021    Procedure: REPAIR, HERNIA, UMBILICAL WITH MESH;  Surgeon: Hardik Ruiz MD;  Location: [facility];  Service: General;  Laterality: N/A;     Past Medical History:   Diagnosis Date    Anxiety     Anxiety     Atherosclerosis of coronary artery     per CT scan dated 7/13/18    Depression     Diverticulosis of intestine without bleeding     GERD (gastroesophageal reflux disease)     HTN (hypertension)     Hyperlipidemia     Hypertension     Plantar fasciitis     Thoracic aorta atherosclerosis     per CT dated 7/13/18. sent for scanning    Vitamin D deficiency     Vitamin D deficiency      Social Determinants of Health     Tobacco Use: Medium Risk    Smoking Tobacco Use: Former    Smokeless Tobacco Use: Never    Passive Exposure: Not on file   Alcohol Use: Not on file   Financial Resource Strain: Not on file   Food Insecurity: Not on file   Transportation Needs: Not on file   Physical Activity: Not on file   Stress: Not on file   Social Connections: Not on file   Housing Stability: Not on file   Depression: Not on file     Review of patient's allergies indicates:   Allergen Reactions    Dog dander Shortness Of Breath     Pt states " my lungs fill up and I cough"    Juniper " Shortness Of Breath     States lungs fill up with fluid    Ragweed pollen      Other reaction(s): Cough    Nsaids (non-steroidal anti-inflammatory drug) Other (See Comments)     Gastric ulcers    Cat dander      Current Outpatient Medications   Medication Instructions    acetaminophen (TYLENOL) 650 mg, Oral, Every 6 hours PRN    albuterol-ipratropium (DUO-NEB) 2.5 mg-0.5 mg/3 mL nebulizer solution TAKE 3 ML BY NEBULIZATION EVERY 6 HOURS AS NEEDED FOR WHEEZE RESCUE Strength: 0.5 MG-3 MG(2.5 mg base)/3 mL    azelastine (ASTELIN) 137 mcg, Nasal, 2 times daily    BINAXNOW COVID-19 AG SELF TEST Kit use as directed    diphenhydrAMINE (BENADRYL) 25 mg, Oral, 2 times daily PRN    esomeprazole (NEXIUM) 40 MG capsule 1 capsule, Oral, Daily    fluticasone propionate (FLONASE) 50 mcg, Each Nostril, Daily    furosemide (LASIX) 20 mg, Oral, Daily PRN    glucosamine-chondroitin 500-400 mg tablet 1 tablet, Oral, As needed (PRN)    melatonin (MELATIN) 2 mg, Oral, Nightly    mepolizumab (NUCALA) 300 mg, Subcutaneous, Every 28 days    montelukast (SINGULAIR) 10 mg, Oral, Nightly    predniSONE (DELTASONE) 80 mg, Oral, Daily    rosuvastatin (CRESTOR) 10 mg, Oral, Daily    sildenafiL (VIAGRA) 50 mg, Oral, Daily PRN    sulfamethoxazole-trimethoprim 400-80mg (BACTRIM,SEPTRA) 400-80 mg per tablet 1 tablet, Oral, Daily    TRINTELLIX 10 mg, Oral, Daily         14 point ROS below  Answers submitted by the patient for this visit:  Review of Symptoms Questionnaire  (Submitted on 11/2/2022)  Fatigue (Tiredness)?: Yes  appetite change : Yes  Unexpected weight loss?: Yes  facial swelling: Yes  sinus pressure : Yes  postnasal drip: Yes  Light sensitivity / Light hurts the eyes?: Yes  Snoring?: Yes  shortness of breath: Yes  wheezing: Yes  cough: Yes  Foot swelling?: Yes  None of these: Yes  None of these: Yes  back pain: Yes  neck pain: Yes  rash: Yes  Seasonal Allergies?: Yes  None of these : Yes  dizziness: Yes  headaches: Yes  tremors:  Yes  Light-headedness: Yes  None of these: Yes  decreased concentration: Yes  Feeling depressed?: Yes  nervous/ anxious: Yes  sleep disturbance: Yes                Objective:      There were no vitals filed for this visit.    General: NAD, well appearing  Eyes: Normal conjunctiva and lids  Face: symmetric, nerve intact  Nose: The nose is without any evidence of any deformity. The nasal mucosa is moist. The septum is midline. There is no evidence of septal hematoma. The turbinates are without abnormality.   Ears: The ears are with normal-appearing pinna. Examination of the canals is normal appearing bilaterally. There is no drainage or erythema noted. The tympanic membranes are normal appearing with pearly color, normal-appearing landmarks and normal light reflex. Hearing is grossly intact.  Mouth: No obvious abnormalities to the lips. The teeth are unremarkable. The gingivae are without any obvious evidence of infection or lesion. The oral mucosa is covered in scrapeable white plaques consistent with thrush. There are no obvious masses to the hard or soft palate.   Oropharynx: The uvula is midline.  The tongue is midline. The posterior pharynx is without erythema or exudate. The tonsils are normal appearing.  Salivary glands: The salivary glands are symmetric and not enlarged, no masses  Neck: No lymphadenopathy, trachea midline, thryoid not enlarged.  Psych: Normal mood and affect.   Neuro: Grossly intact  Speech: fluent    Scope 11/2022    - Nose WNL, spur on floor on left and right, no granulomas. Does have boggy inferior turbinates.   - Nasopharynx- WNL, no masses  - Oropharynx- BOT symmetric, no masses  - Hypopharynx and piriform sinuses- no masses on trumpet maneuver  - Supraglottis- Arytenoids intact, no masses, not edematous or erythematous  - Glottis- Bilateral vocal folds intact with full motion, no masses  - Glottic closure- complete       Assessment and Plan:       1. Oropharyngeal candidiasis    2.  Eosinophilic granulomatosis with polyangiitis (EGPA)    3. Allergic sinusitis    4. Laryngopharyngeal reflux (LPR)    5. DNS (deviated nasal septum)      Thrush today likley worsening symptoms with phlegm and reflux but issues prior. Nystatin, plus xyzal plus pepcid added today.       No evidence of vascular or granulomatous disease on scope in Novemeber 2022 in nose or larynx. Has had positive allergy testing in past so suspect this is more allergic rhinitis, sinusitis issues.      Continue sinus regimen- rinses BID, flonase and astelin BID. Can get can of saline if not able to keep up with rinses. Keep sprays where resting.     Add daily antihistamine and keep singulair    Continue nexium, add pepcid.     RTC:1 month    Plan of care was discussed in detail with the patient, who agreed with the plan as above. All questions were answered in detail.     Anjali Glass MD  Otolaryngology

## 2023-02-06 NOTE — TELEPHONE ENCOUNTER
S/w pt and he states that he spilled his rx and Dr. Glass sent a new rx to [facility]. However, [facility] told pt that he cannot get it for 10 days.    I s/w [facility] and they stated that the rx will not be covered by insurance for 10 days but pt can pay cash price of $91.    Advised pt of cash price, he states that he will go  the rx.

## 2023-02-06 NOTE — TELEPHONE ENCOUNTER
----- Message from Jazzmine Joss sent at 2/6/2023 12:50 PM CST -----  Regarding: SAME DAY REFILL REQUEST  Contact: Patient  Type:  RX Refill Request    Who Called: Patient    Refill or New Rx: refill    RX Name and Strength: nystatin (MYCOSTATIN) 100,000 unit/mL suspension    How is the patient currently taking it? (ex. 1XDay)    Is this a 30 day or 90 day RX:    Preferred Pharmacy with phone number:     [facility] [address] [place] [address]  Phone: [phone] Fax: [phone]      Would the patient rather a call back or a response via [facility]? CALL BACK    Best Call Back Number: [phone]    Additional Information: states that [facility] told him they can fill after 10 days; would like sent somewhere else; please advise

## 2023-02-24 PROBLEM — R29.6 FREQUENT FALLS: Status: ACTIVE | Noted: 2023-01-01

## 2023-02-24 PROBLEM — R26.81 GAIT INSTABILITY: Status: ACTIVE | Noted: 2023-01-01

## 2023-02-24 PROBLEM — R26.89 IMPAIRMENT OF BALANCE: Status: ACTIVE | Noted: 2023-01-01

## 2023-02-24 PROBLEM — R29.898 LEG WEAKNESS, BILATERAL: Status: ACTIVE | Noted: 2023-01-01

## 2023-02-24 PROBLEM — Z74.09 DECREASED MOBILITY AND ENDURANCE: Status: ACTIVE | Noted: 2023-01-01

## 2023-02-24 NOTE — PLAN OF CARE
"[facility]  Physical Therapy Neurological Rehabilitation Initial Evaluation    Name: Crhis Quiroz  Clinic Number: [id number]    Therapy Diagnosis:   Encounter Diagnoses   Name Primary?    Gait instability     Leg weakness, bilateral     Frequent falls     Impairment of balance     Decreased mobility and endurance      Physician: Neeru Bolivar*    Physician Orders: PT Eval and Treat   Medical Diagnosis from Referral: R26.81 (ICD-10-CM) - Gait instability  Evaluation Date: 2/23/2023  Authorization Period Expiration: 2/15/24  Plan of Care Expiration: 4/21/23  Visit # / Visits authorized: 1/ 1    Time In: 1035  Time Out: 1115  Total Billable Time: 40 minutes    Precautions: Standard and Fall    Subjective   Date of onset: Around October   History of current condition - Chris reports: "I was put on medication for a condition that I have." Patient reports that his weakness and problems walking have gotten worse since new medication. Patient reports difficulties with balance, weakness in both legs, lightheadedness (that comes and goes), and difficulty getting up and down.     Patient reports he most often experiences lightheadedness upon standing. Reports intermittent instances of "whole body shaking, like convulsions" that require him to suddenly return to sitting.        Medical History:   Past Medical History:   Diagnosis Date    Anxiety     Anxiety     Atherosclerosis of coronary artery     per CT scan dated 7/13/18    Depression     Diverticulosis of intestine without bleeding     GERD (gastroesophageal reflux disease)     HTN (hypertension)     Hyperlipidemia     Hypertension     Plantar fasciitis     Thoracic aorta atherosclerosis     per CT dated 7/13/18. sent for scanning    Vitamin D deficiency     Vitamin D deficiency        Surgical History:   Chris Quiroz  has a past surgical history that includes Colonoscopy; Esophagogastroduodenoscopy (Left, 12/7/2018); " "Esophagogastroduodenoscopy (N/A, 2/28/2021); Tonsillectomy; Right Hand Surgery; and Umbilical hernia repair (N/A, 5/19/2021).    Medications:   Chris has a current medication list which includes the following prescription(s): acetaminophen, albuterol-ipratropium, azelastine, binaxnow covid-19 ag self test, diphenhydramine, esomeprazole, famotidine, fluticasone propionate, furosemide, glucosamine-chondroitin, levocetirizine, melatonin, mepolizumab, montelukast, prednisone, rosuvastatin, sildenafil, sulfamethoxazole-trimethoprim 400-80mg, and trintellix, and the following Facility-Administered Medications: mepolizumab, mepolizumab, and mepolizumab.    Allergies:   Review of patient's allergies indicates:   Allergen Reactions    Dog dander Shortness Of Breath     Pt states " my lungs fill up and I cough"    Juniper Shortness Of Breath     States lungs fill up with fluid    Ragweed pollen      Other reaction(s): Cough    Nsaids (non-steroidal anti-inflammatory drug) Other (See Comments)     Gastric ulcers    Cat dander         Imaging:   No pertinent recent imaging available.     Prior Therapy: none for this issue   Social History: lives alone   Falls: 1-2x / week; 7-8 near falls/week    DME: straight cane    Home Environment: single story home with 3 steps to enter (single rail)    Family Present at time of Eval: none present  Occupation: Retired writer, still working some   Prior Level of Function: independent, driving, (I) with walking   Current Level of Function: Patient ambulates with a straight cane. He reports frequent falls. He is having difficulty with stairs (using rail and cane). Patient is still driving but states he is having "a lot of trouble getting and out of car"     Pain:  Current 0/10, worst pain level not reported   Location: Pain all over due to recent fall in gravel parking lot    *Patient also reports flare ups of Fibromyalgia     Patient's goals: to improve strength and balance and reduce falls. " Patient also reports he wants to build strength to be able to get into/out of his car more easily.     Objective     Vitals:   Seated:   Blood Pressure: 99/62 mmHg  Heart Rate:79 bpm    Seated:   Blood Pressure:119/76 mmHg  Heart Rate:97 bpm       Transfers:     Transfers Level of Assistance  Comments   Roll Right Adama. With increased time   Roll Left Adama. With increased time   Roll Supine Adama. With increased time   Roll Prone Adama. With increased time   Supine to Sit Adama. With increased time   Sit to Supine Adama. With increased time   Sit to Stand Adama. Using bilateral upper extremities    Stand to Sit Adama. Using bilateral upper extremities    Transfer from bed to chair Adama. Stand pivot transfer using bilateral upper extremities and straight cane         Strength:      Right Lower Extremity Strength Grade Left Lower Extremity Strength Grade   Hip Flexion 3/5 Hip Flexion 3-/5   Hip Extension 3/5 Hip Extension 2/5   Hip Abduction 3/5 Hip Abduction 3/5   Knee Flexion 3/5 Knee Flexion 3/5   Knee Extension 5/5 Knee Extension 5/5   Ankle Dorsiflexion 4+/5 Ankle Dorsiflexion 5/5   Ankle Plantarflexion 3+/5 Ankle Plantarflexion 5/5     Flexibility: not assessed this date         Light touch sensation:    Right Lower Extremity: Not assessed this date   Left Lower Extremity: Not assessed this date         Balance:    Static sitting balance:Normal  Dynamic sitting balance: Normal    Static standing Balance: Good  Dynamic standing balance: Poor      SLS: see Ng Balance Scale      Tandem stance: unable     Ambulation:     Gait Assessment:   - AD used: SC  - Assistance: modified independent   - Distance: limited community distances  - Speed: not formally assessed this date   - Stairs: Not assessed this date; will be assessed in next treatment session     Gait Analysis:  Upon observation of gait, patient demonstrates deviations including but not limited to: decreased gait speed, increased base of support, forward trunk  flexion, decreased floor clearance bilaterally     Impairments contributing to deviations:  decreased lower extremity strength and impaired balance       Outcome Measures:     SPAIN Balance Assessment    1. Sitting to Standing   3 - able to stand independely using hands  2. Standing Unsupported   4 - able to stand safely 2 minutes without hold  3. Sitting Unsupported   4 - able to sit safely and securely 2 minutes  4. Standing to Sitting   3 - controls descent by using hands  5. Pivot Transfer   3 - able to transfer safely with definite use of hands  6. Standing with Eyes Closed   3 - able to stand 10 seconds with supervision  7. Standing with Feet Together   4 - able to place feet together independently and stand 1 minute safely  8. Reaching Forward with Outstretched Arm   3 - can reach forward 12 cm/5 inches safely  9. Retrieving Object from Floor   3 - able to pick slipper but needs supervision  10. Turning to Look Behind   4 - looks behind from both sides and weights shifts well  11. Turning 360 Degrees   4 - able to turn 360 in seconds or less  12. Placing Alternate Foot on Step   4 - able to stand independently safely and complete 8 steps in 20 seconds  13. Standing with One Foot in Front   3 - able to place foot ahead of other independently and hold 30 seconds  14. Standing on One Foot   2 - able to lift leg indepentdently and hold = or < 3 seconds  Total: 47  Maximum: 56       Evaluation   Timed Up and Go Not tested this date   < 20 sec safe for independent transfers, < 30 sec safe for dependent transfers/assist required   10 Meter Walk Test Not tested this date    Functional Gait Assessment  Not tested this date    [place] Balance Test 47/56   5 Times Sit to Stand Patient is unable to stand from chair without use of bilateral upper extremities for assistance         FOTO Neuromuscular Condition Survey:       Therapist reviewed FOTO scores for Chris Quiroz on 2/23/2023.   FOTO documents entered into  "EPIC - see Media section.    Functional Status Score: 43                TREATMENT   Treatment Time In: N/A  Treatment Time Out: N/A  Total Treatment time separate from Evaluation: 0 minutes    No treatment provided this date secondary to time needed to gather subjective history, complete objective testing, assess functional mobility and perform standardized outcome measures.       Home Exercises and Patient Education Provided    Education provided:   - Patient was provided with education re: goals and plan of care for physical therapy. Patient verbalized understanding and agreement with plan.       Written Home Exercises Provided: to be provided in first follow-up treatment session     Assessment   Chris is a 72 y.o. male referred to outpatient Physical Therapy with a medical diagnosis of Gait instability. Patient presents with signs and symptoms consistent with diagnosis and outlined in "thearpy diagnoses" section of initial evaluation. Additionally, he is limited in safety and independence with functional mobility and self-care secondary to above impairments. Patient reports weekly falls and multiple near falls each week. he presents with decreased strength in bilateral lower extremity. During today's evaluation, he performed the Ng Balance Scale to assess his standing balance. He scored a 47/56 which indicates a minimal risk for falls. Physical therapist feels that patient may have more difficulties with balance during dynamic gait activities as this is when he reports his falls are occurring. Plan to have him completed the Functional Gait Assessment in next treatment session to screen for additional impairments. Mr. Quiroz is a good candidate for outpatient physical therapy to address impairments identified in today's evaluation, increase his/her capacity for community level mobility  and reduce her risk for further falls.    Upon completion of evaluation, patient was provided with education regarding goals " and plan of care with which he verbalized understanding and agreement.       Patient prognosis is Good.   Patient will benefit from skilled outpatient Physical Therapy to address the deficits stated above and in the chart below, provide patient/family education, and to maximize patient's level of independence.     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  Fibromyalgia     Personal Factors:   no deficits     high   Examination  Body Structures and Functions, activity limitations and participation restrictions that may impact the plan of care Body Regions:   lower extremities    Body Systems:    strength  gross coordinated movement  balance  gait  transfers  heart rate  blood pressure    Participation Restrictions:   Difficulty getting into out of car, difficulty with stair negotiation, ambulating with straight cane, multiple falls     Activity limitations:   Learning and applying knowledge  no deficits    General Tasks and Commands  no deficits    Communication  no deficits    Mobility  lifting and carrying objects  walking    Self care  no deficits    Domestic Life  shopping    Interactions/Relationships  no deficits    Life Areas  no deficits    Community and Social Life  community life  recreation and leisure         moderate   Clinical Presentation evolving clinical presentation with changing clinical characteristics moderate   Decision Making/ Complexity Score: moderate     Goals:  Short Term Goals: (4 weeks) Date established: Status:    1. Patient will be provided with an  individualized home exercise program.  2/23/23 New   2.  Patient will demonstrate improve endurance and activity tolerance as evidenced by ability to perform Nu-step x 10 minutes without rests breaks with heart rate post-activity equivalent to 50-80% of maximum heart rate.  2/23/23 New   3. Patient will participate in the Functional Gait Assessment to further evaluate his balance and falls risk while performing dynamic gait activities.  2/23/23 New           Long Term Goals: (8 weeks) Date established: Status:    1. Patient will be independent and compliant with updated home exercise program. 2/23/23   New   2. Patient will demonstrated improved score on the Ng Balance Test to 51/56 to demonstrate improved functional mobility, balance and decreased risk for falls. (Minimal detectable change for older adults = 3.3)      2/23/23   New   3. Patient will increased lower extremity strength as evidenced by increase in manual muscle test  by 1/2 grade as appropriate to decrease gait deviations consistently.  2/23/23   New     4. The patient will be provided with any equipment and/or orthotic evaluations and recommendations as indicated on an ongoing basis prior to discharge from therapy.  2/23/23 New   5. Patient will demonstrate improve endurance and activity tolerance as evidenced by ability to perform Nu-step x 15 minutes without rests breaks with heart rate post-activity equivalent to 50-80% of maximum heart rate.  2/23/23 New   6. Patient will improve his FOTO score from 43 to at least 57 for improved self perception of functional mobility.(score 0-100, high score indicates greater level of function)             Plan   Plan of care Certification: 2/23/2023 to 4/21/23.    Outpatient Physical Therapy 2 times weekly for 8 weeks to include the following interventions: Electrical Stimulation (as indicated and cleared for contraindications), Gait Training, Manual Therapy, Moist Heat/ Ice, Neuromuscular Re-ed, Orthotic  Management and Training, Patient Education, Therapeutic Activities, and Therapeutic Exercise.     IVONNE RIVERA, PT

## 2023-02-27 NOTE — PROGRESS NOTES
[facility]   Physical Therapy Treatment Note     Name: Chris Quiroz  Clinic Number: [id number]    Therapy Diagnosis:   Encounter Diagnoses   Name Primary?    Gait instability Yes    Leg weakness, bilateral     Frequent falls     Impairment of balance     Decreased mobility and endurance      Physician: Neeru Bolivar*    Visit Date: 2/27/2023    Physician Orders: PT Eval and Treat   Medical Diagnosis from Referral: R26.81 (ICD-10-CM) - Gait instability  Evaluation Date: 2/23/2023  Authorization Period Expiration: 2/15/24  Plan of Care Expiration: 4/21/23  Visit # / Visits authorized: 1/30    PTA Visit #: 1/5     Time In: 1745  Time Out: 1830  Total Billable Time: 45 minutes    Precautions: Standard and Fall     SUBJECTIVE     Pt reports: Hurting all over, some days worse than others..  He  had not received   home exercise program.  Response to previous treatment: no problems stated  Functional change: ongoing    Pain: 3/10  Location:  all over     OBJECTIVE     Objective Measures updated at progress report unless specified.     Treatment     Chris received the treatments listed below:      therapeutic exercises to develop strength, endurance, and range of motion for 25 minutes including:  SciFit Level 1 12 minutes with Bilateral Upper Extremities     Sit to stand 5 times with Bilateral hands on chair arms  Stand to sit 5 times with Bilateral hands on knees    Seated:  Bilateral Ankle pumps 20 times  Long arc quads 10 times Right Left alternating verbal cues for proper form  Hip flexion 10 times Right Left alternating    neuromuscular re-education activities to improve: Balance, Coordination, Proprioception, and Posture for 20 minutes. The following activities were included:    Parallel bars:  Bilateral heel raises 10 times  Mini squats 10 times maximal verbal cues for proper form  Hip abduction with toe tap 10 times Right Left alternating  Hip extension with toe tap 10  times Right Left alternating  Hamstring curls 10 times Right Left alternating    Patient Education and Home Exercises     Home Exercises Provided and Patient Education Provided     Education provided:   - Patient provided with verbal and demonstrative instruction for all activities performed in today's session.  - Perform all exercises pain free.    Written Home Exercises Provided: yes. Exercises were reviewed and Chris was able to demonstrate them prior to the end of the session.  Chris demonstrated fair  understanding of the education provided. See EMR under Patient Instructions for exercises provided during therapy sessions    ASSESSMENT     Chris provided good participation and effort during today's session with treatment focused on lower extremity strengthening/endurance and balance activities. Chris tolerated activities with cues for proper form with good follow through and had better mechanics with sit to stand versus mini squats.    Chris Is progressing towards his goals.   Pt prognosis is Fair.     Pt will continue to benefit from skilled outpatient physical therapy to address the deficits listed in the problem list box on initial evaluation, provide pt/family education and to maximize pt's level of independence in the home and community environment.     Pt's spiritual, cultural and educational needs considered and pt agreeable to plan of care and goals.     Anticipated barriers to physical therapy: none identified at this time     Goals:     Short Term Goals: (4 weeks) Date established: Status:    1. Patient will be provided with an individualized home exercise program.  2/23/23 progressing   2.  Patient will demonstrate improve endurance and activity tolerance as evidenced by ability to perform Nu-step x 10 minutes without rests breaks with heart rate post-activity equivalent to 50-80% of maximum heart rate.  2/23/23 progressing   3. Patient will participate in the Functional Gait Assessment to further  evaluate his balance and falls risk while performing dynamic gait activities.  2/23/23 ongoing               Long Term Goals: (8 weeks) Date established: Status:    1. Patient will be independent and compliant with updated home exercise program. 2/23/23    ongoing   2. Patient will demonstrated improved score on the Ng Balance Test to 51/56 to demonstrate improved functional mobility, balance and decreased risk for falls. (Minimal detectable change for older adults = 3.3)        2/23/23    ongoing   3. Patient will increased lower extremity strength as evidenced by increase in manual muscle test  by 1/2 grade as appropriate to decrease gait deviations consistently.  2/23/23    ongoing      4. The patient will be provided with any equipment and/or orthotic evaluations and recommendations as indicated on an ongoing basis prior to discharge from therapy.  2/23/23 ongoing   5. Patient will demonstrate improve endurance and activity tolerance as evidenced by ability to perform Nu-step x 15 minutes without rests breaks with heart rate post-activity equivalent to 50-80% of maximum heart rate.  2/23/23 progressing   6. Patient will improve his FOTO score from 43 to at least 57 for improved self perception of functional mobility.(score 0-100, high score indicates greater level of function)    2/23/23  ongoing        PLAN     Plan of care Certification: 2/23/2023 to 4/21/23.     Outpatient Physical Therapy 2 times weekly for 8 weeks to include the following interventions: Electrical Stimulation (as indicated and cleared for contraindications), Gait Training, Manual Therapy, Moist Heat/ Ice, Neuromuscular Re-ed, Orthotic Management and Training, Patient Education, Therapeutic Activities, and Therapeutic Exercise.     Delilah Gibson, PTA

## 2023-03-01 NOTE — PROGRESS NOTES
PT/PTA met face to face to discuss pt's treatment plan and progress towards established goals. Pt will be seen by a physical therapist minimally every 6th visit or every 30 days.    Delilah Gibson PTA

## 2023-03-02 NOTE — PATIENT INSTRUCTIONS
Continue sinus regimen- rinses twice a day, continue flonase and astelin twice a day. Can get can of saline if not able to keep up with rinses. Keep sprays where resting.     Continue daily antihistamine, xyzal and keep singulair    Continue nexium in morning, continue pepcid at night.     Will get swallow study and esophagram.

## 2023-03-02 NOTE — PROGRESS NOTES
Otolaryngology Clinic Note    Subjective:       Patient ID: Chris Quiroz is a 72 y.o. male.    Chief Complaint: Follow-up (1mo trent sinus- pt doing much better)    3/2/23: RTC. Feeling somewhat better after nystatin. Still with issues swallowing. Could not swallow water today, usually has issues with pills. Thick phlegm seems to be coming from nose and cannot clear in throat. Has been doing rinses and sprays more regularly and singulair and antihistamine. Navage helps.   Taking nexium and pepcid.     2/2/23: RTC. Doing sinus rinses and sprays when physically able, not daily on full regimen. Having difficulty getting around, walking, standing up.   Getting Nucala injections with pulm. Still taking prednisone 80 mg daily and daily bactrim and nexium.   Has cup of phlegm with him. Coating his throat and mouth. Trouble with drinking water. Worse past week. Phlegm catches pill in throat and causes pain, then more pain in stomach. Usually does not have issues with weather changes. Has had issues with nucala he thinks.     11/2/22  History of Present Illness: Chris Quiroz is a 72 y.o. male presenting with recent workup at [facility] for chronic cough, swallowing issue, dyspnea. 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 Was a former smoker, quit 2001.   Sinus: headaches, PND, coughing phlegm, congestion. He is currently on bactrim for 30 days and steroids per pulmonologist. He has had prior allergy shots without effect, stopped recently was on for less than a year. (Thinks it was a bit of everything, definitely cats.) Has tried flonase, thinks he has tried other sprays. Saline. Has tried daily antihistamine. Now PRN. Taking singulair for past year. Sputum is greyish. No time of the year is worse.   Denies nosebleeds or overdrying. No hyposmia.  Denies swallowing issues. Had normal swallow study. Coughing unrelated to swallowing.   Coughing is day and night, less so now  "with nebs, has helped with cough and wheezing.     Pulm note: 10/26/22: "Today the patient reports that he is feeling terrible.  Reports that he has had a chronic history of cough productive of gray/green sputum that started approximately 8 years ago with associated shortness of breath, wheezing.  He says that in the past he has been treated for allergies with allergy shots, nasal sprays, and Breo inhaler but this did not improve his symptoms.  He also had intermittent courses of oral corticosteroids which he says also have not really helped with his symptoms.  He endorses shortness of breath especially with exertion, says that he can  currently walk  feet before having to stop and catch his breath and this has been getting progressively worse.   He is a former smoker quit in 2001, smoked 2 packs per day for approximately 30 years.  No other use of drugs, vaping, marijuana.  He has a dog at home no other pets.  Works in a theater.  Denies any history to mold exposure reports he does have some autoimmune disease that runs in the family but is unsure of a particular diagnosis.  He endorses a 40 lb weight loss over the past 4-5 months because he says whenever he tries to eat he has a coughing spell. Prior CT scan in 2020 showing subpleural reticulation/fibrosis without honeycombing worse on the right with some associated traction bronchiectasis.      He did not have a formal workup for ILD until recently when he went to [facility] 1 month ago for another opinion.  Patient had extensive workup which was significant for a p-ANCA positivity, IgE level 1869, peripheral eosinophilia, elevated inflammatory markers, elevated exhaled nitric oxide.  Also had a swallow study with normal findings but a lateral pharyngeal pouch.  Evaluated by ENT with no concerns for aspiration.  Had a 6 minute walk test with desaturation to 85%.  Overnight oximetry with severe sleep disorder breathing.  TTE showed grade 1/3 left " "ventricular diastolic dysfunction, elevated mean arterial pressure estimated at 28.  A repeat CT done on 09/20/2022 showed fibrotic appearing opacities with CT pattern indeterminate for UIP with mild air trapping and peribronchovascular extension.  Right lung with greater involvement than left.  A presumptive diagnosis of EGPA was made at [facility] with recommendations for further evaluation with repeat ANCA profile to include MPO and PR3 testing as well as CT sinus with referral to ENT for possible sinus biopsy and consideration of bronchoscopy with BAL and transbronchial biopsy."      Past Surgical History:   Procedure Laterality Date    COLONOSCOPY      ESOPHAGOGASTRODUODENOSCOPY Left 12/7/2018    Procedure: EGD (ESOPHAGOGASTRODUODENOSCOPY);  Surgeon: Josiah Cuadra MD;  Location: [facility];  Service: Endoscopy;  Laterality: Left;    ESOPHAGOGASTRODUODENOSCOPY N/A 2/28/2021    Procedure: EGD (ESOPHAGOGASTRODUODENOSCOPY);  Surgeon: James Martines Jr., MD;  Location: [facility];  Service: Endoscopy;  Laterality: N/A;    Right Hand Surgery      TONSILLECTOMY      UMBILICAL HERNIA REPAIR N/A 5/19/2021    Procedure: REPAIR, HERNIA, UMBILICAL WITH MESH;  Surgeon: Hardik Ruiz MD;  Location: [facility];  Service: General;  Laterality: N/A;     Past Medical History:   Diagnosis Date    Anxiety     Anxiety     Atherosclerosis of coronary artery     per CT scan dated 7/13/18    Depression     Diverticulosis of intestine without bleeding     GERD (gastroesophageal reflux disease)     HTN (hypertension)     Hyperlipidemia     Hypertension     Plantar fasciitis     Thoracic aorta atherosclerosis     per CT dated 7/13/18. sent for scanning    Vitamin D deficiency     Vitamin D deficiency      Social Determinants of Health     Tobacco Use: Medium Risk    Smoking Tobacco Use: Former    Smokeless Tobacco Use: Never    Passive Exposure: Not on file   Alcohol Use: Not on file   Financial Resource Strain: Not on file   Food " Insecurity: Not on file   Transportation Needs: Not on file   Physical Activity: Not on file   Stress: Not on file   Social Connections: Not on file   Housing Stability: Not on file   Depression: Not on file     Review of patient's allergies indicates:   Allergen Reactions    Juniper Shortness Of Breath     States lungs fill up with fluid    Nsaids (non-steroidal anti-inflammatory drug) Other (See Comments)     Gastric ulcers    Cat dander      Current Outpatient Medications   Medication Instructions    acetaminophen (TYLENOL) 650 mg, Oral, Every 6 hours PRN    albuterol-ipratropium (DUO-NEB) 2.5 mg-0.5 mg/3 mL nebulizer solution TAKE 3 ML BY NEBULIZATION EVERY 6 HOURS AS NEEDED FOR WHEEZE RESCUE Strength: 0.5 MG-3 MG(2.5 mg base)/3 mL    azelastine (ASTELIN) 137 mcg, Nasal, 2 times daily    BINAXNOW COVID-19 AG SELF TEST Kit use as directed    diphenhydrAMINE (BENADRYL) 25 mg, Oral, 2 times daily PRN    esomeprazole (NEXIUM) 40 MG capsule 1 capsule, Oral, Daily    famotidine (PEPCID) 40 mg, Oral, Nightly    fluticasone propionate (FLONASE) 50 mcg, Each Nostril, Daily    furosemide (LASIX) 20 mg, Oral, Daily PRN    glucosamine-chondroitin 500-400 mg tablet 1 tablet, Oral, As needed (PRN)    levocetirizine (XYZAL) 5 mg, Oral, Nightly    melatonin (MELATIN) 2 mg, Oral, Nightly    mepolizumab (NUCALA) 300 mg, Subcutaneous, Every 28 days    montelukast (SINGULAIR) 10 mg, Oral, Nightly    predniSONE (DELTASONE) 20 MG tablet Take 3 tablets (60 mg total) by mouth once daily for 30 days, THEN 2 tablets (40 mg total) once daily for 30 days, THEN 1 tablet (20 mg total) once daily for 30 days, THEN 0.5 tablets (10 mg total) once daily.    rosuvastatin (CRESTOR) 10 mg, Oral, Daily    sildenafiL (VIAGRA) 50 mg, Oral, Daily PRN    sulfamethoxazole-trimethoprim 400-80mg (BACTRIM,SEPTRA) 400-80 mg per tablet 1 tablet, Oral, Daily    TRINTELLIX 10 mg, Oral, Daily         ENT ROS negative except as stated above.         Objective:       There were no vitals filed for this visit.    General: NAD, well appearing  Eyes: Normal conjunctiva and lids  Face: symmetric, nerve intact  Nose: The nose is without any evidence of any deformity. The nasal mucosa is moist. The septum is midline. There is no evidence of septal hematoma. The turbinates are without abnormality.   Ears: The ears are with normal-appearing pinna. Examination of the canals is normal appearing bilaterally. There is no drainage or erythema noted. The tympanic membranes are normal appearing with pearly color, normal-appearing landmarks and normal light reflex. Hearing is grossly intact.  Mouth: No obvious abnormalities to the lips. The teeth are unremarkable. The gingivae are without any obvious evidence of infection or lesion. The oral mucosa is covered in scrapeable white plaques consistent with thrush. There are no obvious masses to the hard or soft palate. Has not scrapable small white patches on tongue.   Oropharynx: The uvula is midline.  The tongue is midline. The posterior pharynx is without erythema or exudate. The tonsils are normal appearing.  Salivary glands: The salivary glands are symmetric and not enlarged, no masses  Neck: No lymphadenopathy, trachea midline, thryoid not enlarged.  Psych: Normal mood and affect.   Neuro: Grossly intact  Speech: fluent    Procedure: Flexible laryngoscopy  Indications: follow up thrush  Verbal consent obtained  Anesthesia: lidocaine and phenylephrine nasal spray  Procedure: Scope was passed into right or left nare, to nasopharynx and down to visualize the glottis. Findings below. Patient tolerated procedure well.     - Nose WNL  - Nasopharynx- WNL, no masses  - Oropharynx- BOT symmetric, no masses  - Hypopharynx and piriform sinuses- no masses on trumpet maneuver  - Supraglottis- Arytenoids intact, no masses, not edematous or erythematous  - Glottis- Bilateral vocal folds intact with full motion, no masses  - Glottic closure- complete          Assessment and Plan:       1. Eosinophilic granulomatosis with polyangiitis (EGPA)    2. Laryngopharyngeal reflux (LPR)    3. Pharyngeal dysphagia        Thrush resolved. Still with phlegm and dysphagia.       No evidence of vascular or granulomatous disease on scope in nose or larynx. Has had positive allergy testing in past so suspect this is more allergic rhinitis, sinusitis issues.      Continue sinus regimen- rinses BID, continue flonase and astelin BID. Can get can of saline if not able to keep up with rinses. Keep sprays where resting.     Add daily antihistamine, xyzal and keep singulair    Continue nexium, continue pepcid at night.     Will get swallow study and esophagram.     RTC: will contact after swallow studies. Plan to recheck in 6 months. Contact sooner if needed.     Plan of care was discussed in detail with the patient, who agreed with the plan as above. All questions were answered in detail.     Anjali Glass MD  Otolaryngology

## 2023-03-07 NOTE — PROGRESS NOTES
"[facility]   Physical Therapy Treatment Note     Name: Chris Qiuroz  Clinic Number: [id number]    Therapy Diagnosis:   Encounter Diagnoses   Name Primary?    Gait instability Yes    Leg weakness, bilateral     Frequent falls     Impairment of balance     Decreased mobility and endurance      Physician: Neeru Bolivar*    Visit Date: 3/7/2023    Physician Orders: PT Eval and Treat   Medical Diagnosis from Referral: R26.81 (ICD-10-CM) - Gait instability  Evaluation Date: 2/23/2023  Authorization Period Expiration: 2/15/24  Plan of Care Expiration: 4/21/23  Visit # / Visits authorized: 2/30    PTA Visit #: 2/5     Time In: 0845  Time Out: 0928  Total Billable Time: 43    Precautions: Standard and Fall     SUBJECTIVE     Pt reports: "I have no pain", having dizziness with initial stand but "gets better once standing".  He reports was compliant home exercise program.  Response to previous treatment: no problems stated  Functional change: ongoing    Pain: 0/10  Location: Not Applicable     OBJECTIVE     Objective Measures updated at progress report unless specified.     Treatment     Chris received the treatments listed below:      therapeutic exercises to develop strength, endurance, and range of motion for 23 minutes including:    SciFit Level 1 12 minutes with Bilateral Upper Extremities     Sit:  Hamstring stretch with foot on stool performing dorsiflexion 30 times Right Left     Sit to stand with cushion in seat 6 times with Bilateral hands on chair arms with verbal cues to increase anterior lean  Stand to sit with cushion in seat 6 times with Bilateral hands on knees    neuromuscular re-education activities to improve: Balance, Coordination, Proprioception, and Posture for 20 minutes. The following activities were included:    Parallel bars:  Bilateral heel raises 10 times on foam cushion with light upper extremity support  Bilateral balance on foam 2 minutes  Step ups 6 " inch box Left/right 10 times with light upper extremity support    Toe  taps on 4 inch box 5 times Right Left alternating in front of chair.     (Activity time includes skilled set-up and positioning as well as therapeutic rest)    Patient Education and Home Exercises     Home Exercises Provided and Patient Education Provided     Education provided:   - Patient provided with verbal and demonstrative instruction for all activities performed in today's session.  -  front of seat when dizzy until dizziness subsides before walking, return to sit if not subsiding    Written Home Exercises Provided: Patient instructed to cont prior HEP.  See EMR under Patient Instructions for exercises provided during therapy sessions    ASSESSMENT     Chris provided good participation and effort during today's session with treatment focused on lower extremity strengthening/endurance and balance activities. Improved sit<>stand with elevated seat, good follow through with instruction for all activities.    Chris Is progressing towards his goals.   Pt prognosis is Fair.     Pt will continue to benefit from skilled outpatient physical therapy to address the deficits listed in the problem list box on initial evaluation, provide pt/family education and to maximize pt's level of independence in the home and community environment.     Pt's spiritual, cultural and educational needs considered and pt agreeable to plan of care and goals.     Anticipated barriers to physical therapy: none identified at this time     Goals:     Short Term Goals: (4 weeks) Date established: Status:    1. Patient will be provided with an individualized home exercise program.  2/23/23 progressing   2.  Patient will demonstrate improve endurance and activity tolerance as evidenced by ability to perform Nu-step x 10 minutes without rests breaks with heart rate post-activity equivalent to 50-80% of maximum heart rate.  2/23/23 progressing   3. Patient will  participate in the Functional Gait Assessment to further evaluate his balance and falls risk while performing dynamic gait activities.  2/23/23 ongoing               Long Term Goals: (8 weeks) Date established: Status:    1. Patient will be independent and compliant with updated home exercise program. 2/23/23    ongoing   2. Patient will demonstrated improved score on the Ng Balance Test to 51/56 to demonstrate improved functional mobility, balance and decreased risk for falls. (Minimal detectable change for older adults = 3.3)        2/23/23    ongoing   3. Patient will increased lower extremity strength as evidenced by increase in manual muscle test  by 1/2 grade as appropriate to decrease gait deviations consistently.  2/23/23    ongoing      4. The patient will be provided with any equipment and/or orthotic evaluations and recommendations as indicated on an ongoing basis prior to discharge from therapy.  2/23/23 ongoing   5. Patient will demonstrate improve endurance and activity tolerance as evidenced by ability to perform Nu-step x 15 minutes without rests breaks with heart rate post-activity equivalent to 50-80% of maximum heart rate.  2/23/23 progressing   6. Patient will improve his FOTO score from 43 to at least 57 for improved self perception of functional mobility.(score 0-100, high score indicates greater level of function)    2/23/23  ongoing        PLAN     Plan of care Certification: 2/23/2023 to 4/21/23.     Outpatient Physical Therapy 2 times weekly for 8 weeks to include the following interventions: Electrical Stimulation (as indicated and cleared for contraindications), Gait Training, Manual Therapy, Moist Heat/ Ice, Neuromuscular Re-ed, Orthotic Management and Training, Patient Education, Therapeutic Activities, and Therapeutic Exercise.     Delilah Gibson, PTA

## 2023-03-08 NOTE — PROGRESS NOTES
[facility]   Physical Therapy Treatment Note     Name: Chris Quiroz  Clinic Number: [id number]    Therapy Diagnosis:   Encounter Diagnoses   Name Primary?    Gait instability Yes    Leg weakness, bilateral     Frequent falls     Impairment of balance     Decreased mobility and endurance        Physician: Neeru Bolivar*    Visit Date: 3/8/2023    Physician Orders: PT Eval and Treat   Medical Diagnosis from Referral: R26.81 (ICD-10-CM) - Gait instability  Evaluation Date: 2/23/2023  Authorization Period Expiration: 2/15/24  Plan of Care Expiration: 4/21/23  Visit # / Visits authorized: 3/30    PTA Visit #: 3/5     Time In: 1116  Time Out: 1200  Total Billable Time: 44 minutes     Precautions: Standard and Fall     SUBJECTIVE     Pt reports: Feeling a difference at home.  He reports was compliant home exercise program.  Response to previous treatment: no problems stated  Functional change: ongoing    Pain: 0/10  Location: Not Applicable     OBJECTIVE     Objective Measures updated at progress report unless specified.     Treatment     Chris received the treatments listed below:      therapeutic exercises to develop strength, endurance, and range of motion for 20 minutes including:    SciFit Level 2 10 minutes with Bilateral Upper Extremities     Sit:  Hamstring stretch with foot on stool performing dorsiflexion 30 times Right Left     Sit to stand on elevated mat 3 sets of 5 with verbal cues for anterior lean.     neuromuscular re-education activities to improve: Balance, Coordination, Proprioception, and Posture for 24 minutes. The following activities were included:    Parallel bars:  Bilateral heel raises 10 times on foam cushion with light upper extremity support  Static stand on foam cushion 2 minutes with minimal sway  Step ups 7 inch box Left/right 10 times with light upper extremity support  Toe taps with flimsy plastic cup Right Left alternating 10 times with upper  extremity same side support   Tandem stance on narrow foam cushion Right Left leading 30 seconds 2 times     (Activity time includes skilled set-up and positioning as well as therapeutic rest)    Patient Education and Home Exercises     Home Exercises Provided and Patient Education Provided     Education provided:   - Patient provided with verbal and demonstrative instruction for all activities performed in today's session.    Written Home Exercises Provided: Patient instructed to cont prior HEP.  See EMR under Patient Instructions for exercises provided during therapy sessions    ASSESSMENT     Chris provided good participation and effort during today's session with treatment focused on lower extremity strengthening/endurance and balance activities. Improved sit<>stand with elevated seat, good follow through with instruction for all activities. Some dizziness when standing with good follow through by letting dizziness subside before moving forward.    Chris Is progressing towards his goals.   Pt prognosis is Fair.     Pt will continue to benefit from skilled outpatient physical therapy to address the deficits listed in the problem list box on initial evaluation, provide pt/family education and to maximize pt's level of independence in the home and community environment.     Pt's spiritual, cultural and educational needs considered and pt agreeable to plan of care and goals.     Anticipated barriers to physical therapy: none identified at this time     Goals:     Short Term Goals: (4 weeks) Date established: Status:    1. Patient will be provided with an individualized home exercise program.  2/23/23 progressing   2.  Patient will demonstrate improve endurance and activity tolerance as evidenced by ability to perform Nu-step x 10 minutes without rests breaks with heart rate post-activity equivalent to 50-80% of maximum heart rate.  2/23/23 progressing   3. Patient will participate in the Functional Gait Assessment to  further evaluate his balance and falls risk while performing dynamic gait activities.  2/23/23 ongoing               Long Term Goals: (8 weeks) Date established: Status:    1. Patient will be independent and compliant with updated home exercise program. 2/23/23    ongoing   2. Patient will demonstrated improved score on the Ng Balance Test to 51/56 to demonstrate improved functional mobility, balance and decreased risk for falls. (Minimal detectable change for older adults = 3.3)        2/23/23    ongoing   3. Patient will increased lower extremity strength as evidenced by increase in manual muscle test  by 1/2 grade as appropriate to decrease gait deviations consistently.  2/23/23    ongoing      4. The patient will be provided with any equipment and/or orthotic evaluations and recommendations as indicated on an ongoing basis prior to discharge from therapy.  2/23/23 ongoing   5. Patient will demonstrate improve endurance and activity tolerance as evidenced by ability to perform Nu-step x 15 minutes without rests breaks with heart rate post-activity equivalent to 50-80% of maximum heart rate.  2/23/23 progressing   6. Patient will improve his FOTO score from 43 to at least 57 for improved self perception of functional mobility.(score 0-100, high score indicates greater level of function)    2/23/23  ongoing        PLAN     Plan of care Certification: 2/23/2023 to 4/21/23.     Outpatient Physical Therapy 2 times weekly for 8 weeks to include the following interventions: Electrical Stimulation (as indicated and cleared for contraindications), Gait Training, Manual Therapy, Moist Heat/ Ice, Neuromuscular Re-ed, Orthotic Management and Training, Patient Education, Therapeutic Activities, and Therapeutic Exercise.     Delilah Gibson, PTA

## 2023-03-17 NOTE — PROGRESS NOTES
[facility]   Physical Therapy Treatment Note     Name: Chris Quiroz  Clinic Number: [id number]    Therapy Diagnosis:   Encounter Diagnoses   Name Primary?    Gait instability Yes    Leg weakness, bilateral     Frequent falls     Impairment of balance     Decreased mobility and endurance        Physician: Neeru Bolivar*    Visit Date: 3/17/2023    Physician Orders: PT Eval and Treat   Medical Diagnosis from Referral: R26.81 (ICD-10-CM) - Gait instability  Evaluation Date: 2/23/2023  Authorization Period Expiration: 2/15/24  Plan of Care Expiration: 4/21/23  Visit # / Visits authorized: 5/30    PTA Visit #: 1/5     Time In: 1025  Time Out: 1110  Total Billable Time: 45 minutes     Precautions: Standard and Fall     SUBJECTIVE     Pt reports: Bishop was able to go up 3 stairs at home without taking a break. Reports has not fallen this week.    He reports was compliant home exercise program.  Response to previous treatment: no problems stated  Functional change: ongoing    Pain: 0/10  Location: Not Applicable     OBJECTIVE     Objective Measures updated at progress report unless specified.     Treatment     Chris received the treatments listed below:      therapeutic exercises to develop strength, endurance, and range of motion for 20 minutes including:    NuStep Level 3 15 minutes with Bilateral Upper Extremities     Shuttle:  50# Bilateral leg press 3 minutes with verbal cues for decreased speed   25# Unilateral leg press right left 1.5 minutes each     neuromuscular re-education activities to improve: Balance, Coordination, Proprioception, and Posture for 25 minutes. The following activities were included:    Seated Trunk flexion with green Theraball 2 minutes.     Sit to stand on elevated mat 2 sets of 5 with verbal cues for anterior lean.     Parallel bars:  Step ups on 6 inch step right left 1 minute each with Light bilateral upper extremity support.   Static standing  feet apart on foam cushion 2 minutes.     (Activity time includes skilled set-up and positioning as well as therapeutic rest)    Patient Education and Home Exercises     Home Exercises Provided and Patient Education Provided     Education provided:   - Patient provided with verbal and demonstrative instruction for all activities performed in today's session.    Written Home Exercises Provided: Patient instructed to cont prior HEP.  See EMR under Patient Instructions for exercises provided during therapy sessions    ASSESSMENT     Chris provided good participation and effort during today's session with treatment focused on lower extremity strengthening/endurance and balance activities. Chris showed an increase in time on the NuStep and resistance, as well as tolerated strengthening exercises on the Shuttle with no need for a break. Needed some verbal cues for nose over toes while doing sit to stands as well as increasing Bilateral knee flexion stand to sit with good follow through.     Chris Is progressing towards his goals.   Pt prognosis is Fair.     Pt will continue to benefit from skilled outpatient physical therapy to address the deficits listed in the problem list box on initial evaluation, provide pt/family education and to maximize pt's level of independence in the home and community environment.     Pt's spiritual, cultural and educational needs considered and pt agreeable to plan of care and goals.     Anticipated barriers to physical therapy: none identified at this time     Goals:     Short Term Goals: (4 weeks) Date established: Status:    1. Patient will be provided with an individualized home exercise program.  2/23/23 progressing   2.  Patient will demonstrate improve endurance and activity tolerance as evidenced by ability to perform Nu-step x 10 minutes without rests breaks with heart rate post-activity equivalent to 50-80% of maximum heart rate.  2/23/23 progressing   3. Patient will participate in  the Functional Gait Assessment to further evaluate his balance and falls risk while performing dynamic gait activities.  2/23/23 ongoing               Long Term Goals: (8 weeks) Date established: Status:    1. Patient will be independent and compliant with updated home exercise program. 2/23/23    ongoing   2. Patient will demonstrated improved score on the Ng Balance Test to 51/56 to demonstrate improved functional mobility, balance and decreased risk for falls. (Minimal detectable change for older adults = 3.3)        2/23/23    ongoing   3. Patient will increased lower extremity strength as evidenced by increase in manual muscle test  by 1/2 grade as appropriate to decrease gait deviations consistently.  2/23/23    ongoing      4. The patient will be provided with any equipment and/or orthotic evaluations and recommendations as indicated on an ongoing basis prior to discharge from therapy.  2/23/23 ongoing   5. Patient will demonstrate improve endurance and activity tolerance as evidenced by ability to perform Nu-step x 15 minutes without rests breaks with heart rate post-activity equivalent to 50-80% of maximum heart rate.  2/23/23 progressing   6. Patient will improve his FOTO score from 43 to at least 57 for improved self perception of functional mobility.(score 0-100, high score indicates greater level of function)    2/23/23  ongoing        PLAN     Plan of care Certification: 2/23/2023 to 4/21/23.     Outpatient Physical Therapy 2 times weekly for 8 weeks to include the following interventions: Electrical Stimulation (as indicated and cleared for contraindications), Gait Training, Manual Therapy, Moist Heat/ Ice, Neuromuscular Re-ed, Orthotic Management and Training, Patient Education, Therapeutic Activities, and Therapeutic Exercise.     I certify that I was present in the room directing PATRICIA Badillo  in service delivery and guiding them using my skilled judgment. As the co-signing therapist I have  reviewed the students documentation and am responsible for the treatment, assessment, and plan.      Delilah Gibson, Physical Therapist Assistant

## 2023-03-24 NOTE — PROGRESS NOTES
Patient to clinic and became angry with suggestion to place personal items in appropriate slot. Patient then refused therapy session and left the clinic.

## 2023-03-28 PROBLEM — F10.21 ALCOHOL USE DISORDER, SEVERE, IN EARLY REMISSION: Status: RESOLVED | Noted: 2020-03-20 | Resolved: 2023-01-01

## 2023-03-28 PROBLEM — I27.29: Status: ACTIVE | Noted: 2023-01-01

## 2023-03-28 PROBLEM — I77.6: Status: ACTIVE | Noted: 2023-01-01

## 2023-03-28 NOTE — PROGRESS NOTES
Subjective:       Patient ID: Chris Quiroz is a 72 y.o. male.    Chief Complaint: Abdominal Pain    HPI here with concerns for abdominal pain. States has had this in the past and was diagnosed with Peptic ulcers. States he was treated with antibiotics and it resolved. States eating or drinking makes him have upper abdomen, epigastric type pain. He is on nexium and pepcid.     Fell yesterday. States he fell backwards. States nothing hurts.      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 He is seeing Dr. Mccray in Pulmonology.  He is doing Prednisone taper. On Nucala injections. He feels that since being on the high dose steroids he is having more muscle weakness, edema, elevated glucose. States feels tired all the time. Gait is unsteady.     Multiple chronic issues to review. See ROS/assessment and plan      The following portion of the patients history was reviewed and updated as appropriate: allergies, current medications, past medical and surgical history. Past social history and problem list reviewed. Family PMH and Past social history reviewed. Tobacco, Illicit drug use reviewed.      Review of patient's allergies indicates:   Allergen Reactions    Juniper Shortness Of Breath     States lungs fill up with fluid    Nsaids (non-steroidal anti-inflammatory drug) Other (See Comments)     Gastric ulcers    Cat dander          Current Outpatient Medications:     acetaminophen (TYLENOL) 325 MG tablet, Take 2 tablets (650 mg total) by mouth every 6 (six) hours as needed for Pain., Disp: 60 tablet, Rfl: 0    albuterol-ipratropium (DUO-NEB) 2.5 mg-0.5 mg/3 mL nebulizer solution, TAKE 3 ML BY NEBULIZATION EVERY 6 HOURS AS NEEDED FOR WHEEZE RESCUE Strength: 0.5 MG-3 MG(2.5 mg base)/3 mL, Disp: 360 mL, Rfl: 3    azelastine (ASTELIN) 137 mcg (0.1 %) nasal spray, 1 spray (137 mcg total) by Nasal route 2 (two)  times daily., Disp: 30 mL, Rfl: 11    BINAXNOW COVID-19 AG SELF TEST Kit, use as directed, Disp: , Rfl:     diphenhydrAMINE (BENADRYL) 25 mg capsule, Take 25 mg by mouth 2 (two) times daily as needed., Disp: , Rfl:     esomeprazole (NEXIUM) 40 MG capsule, Take 1 capsule by mouth once daily., Disp: , Rfl:     famotidine (PEPCID) 40 MG tablet, Take 1 tablet (40 mg total) by mouth every evening., Disp: 90 tablet, Rfl: 3    fluticasone propionate (FLONASE) 50 mcg/actuation nasal spray, 1 spray (50 mcg total) by Each Nostril route once daily., Disp: 18.2 mL, Rfl: 3    furosemide (LASIX) 20 MG tablet, Take 1 tablet (20 mg total) by mouth daily as needed (lower extremity edema)., Disp: 60 tablet, Rfl: 2    glucosamine-chondroitin 500-400 mg tablet, Take 1 tablet by mouth as needed. , Disp: , Rfl:     levocetirizine (XYZAL) 5 MG tablet, Take 1 tablet (5 mg total) by mouth every evening., Disp: 90 tablet, Rfl: 3    melatonin 5 mg Tab, Take 2 mg by mouth nightly. , Disp: , Rfl:     mepolizumab (NUCALA) 100 mg/mL Syrg, Inject 3 mLs (300 mg total) into the skin every 28 days., Disp: 300 mL, Rfl: 0    montelukast (SINGULAIR) 10 mg tablet, Take 1 tablet (10 mg total) by mouth every evening., Disp: 30 tablet, Rfl: 6    predniSONE (DELTASONE) 20 MG tablet, Take 3.5 tablets (70 mg total) by mouth once daily., Disp: 105 tablet, Rfl: 2    rosuvastatin (CRESTOR) 10 MG tablet, Take 1 tablet (10 mg total) by mouth once daily., Disp: 90 tablet, Rfl: 3    sildenafiL (VIAGRA) 50 MG tablet, Take 50 mg by mouth daily as needed., Disp: , Rfl:     sulfamethoxazole-trimethoprim 400-80mg (BACTRIM,SEPTRA) 400-80 mg per tablet, Take 1 tablet by mouth once daily., Disp: 30 tablet, Rfl: 3    vortioxetine (TRINTELLIX) 10 mg Tab, Take 1 tablet (10 mg total) by mouth once daily., Disp: 90 tablet, Rfl: 3    Current Facility-Administered Medications:     mepolizumab (NUCALA) injection 100 mg, 100 mg, Subcutaneous, Q28 Days, Joesph Fofana MD, 100 mg at  03/20/23 1416    mepolizumab (NUCALA) injection 100 mg, 100 mg, Subcutaneous, Q28 Days, Joesph Fofana MD, 100 mg at 03/20/23 1416    mepolizumab (NUCALA) injection 100 mg, 100 mg, Subcutaneous, Q28 Days, Joesph Fofana MD, 100 mg at 03/20/23 1414    Past Medical History:   Diagnosis Date    Anxiety     Anxiety     Atherosclerosis of coronary artery     per CT scan dated 7/13/18    Depression     Diverticulosis of intestine without bleeding     GERD (gastroesophageal reflux disease)     HTN (hypertension)     Hyperlipidemia     Hypertension     Plantar fasciitis     Thoracic aorta atherosclerosis     per CT dated 7/13/18. sent for scanning    Vitamin D deficiency     Vitamin D deficiency        Past Surgical History:   Procedure Laterality Date    COLONOSCOPY      ESOPHAGOGASTRODUODENOSCOPY Left 12/7/2018    Procedure: EGD (ESOPHAGOGASTRODUODENOSCOPY);  Surgeon: Josiah Cuadra MD;  Location: [facility];  Service: Endoscopy;  Laterality: Left;    ESOPHAGOGASTRODUODENOSCOPY N/A 2/28/2021    Procedure: EGD (ESOPHAGOGASTRODUODENOSCOPY);  Surgeon: James Martines Jr., MD;  Location: [facility];  Service: Endoscopy;  Laterality: N/A;    Right Hand Surgery      TONSILLECTOMY      UMBILICAL HERNIA REPAIR N/A 5/19/2021    Procedure: REPAIR, HERNIA, UMBILICAL WITH MESH;  Surgeon: Hardik Ruiz MD;  Location: [facility];  Service: General;  Laterality: N/A;       Social History     Socioeconomic History    Marital status:    Tobacco Use    Smoking status: Former     Packs/day: 2.00     Types: Cigarettes    Smokeless tobacco: Never    Tobacco comments:     Age Started: 22 Age Quit: 51   Substance and Sexual Activity    Alcohol use: Not Currently     Comment: once in a while    Drug use: No     Comment: Tried  CBD     Sexual activity: Never     Review of Systems   Constitutional:  Positive for activity change, appetite change (due to discomfort with eating/drinking) and fatigue. Negative for fever.   HENT:  Positive  "for rhinorrhea and sore throat. Negative for congestion and trouble swallowing.    Eyes:  Negative for visual disturbance.   Respiratory:  Negative for cough, chest tightness, shortness of breath and wheezing.    Cardiovascular:  Negative for chest pain, palpitations and leg swelling.   Gastrointestinal:  Positive for abdominal pain. Negative for blood in stool, diarrhea, nausea and vomiting.   Genitourinary:  Negative for difficulty urinating.   Musculoskeletal:  Positive for arthralgias and gait problem. Negative for back pain.   Skin:  Negative for rash.   Neurological:  Positive for weakness and light-headedness. Negative for dizziness and headaches.   Hematological:  Negative for adenopathy. Does not bruise/bleed easily.   Psychiatric/Behavioral:  Positive for agitation and dysphoric mood. Negative for decreased concentration and sleep disturbance. The patient is not nervous/anxious.      Objective:      /62   Pulse 97   Temp 98.2 °F (36.8 °C)   Resp 20   Ht 5' 10" (1.778 m)   Wt 84.4 kg (185 lb 15.3 oz)   SpO2 96%   BMI 26.68 kg/m²      Physical Exam  Constitutional:       Appearance: Normal appearance. He is well-developed. He is ill-appearing.   HENT:      Head: Normocephalic.   Eyes:      Conjunctiva/sclera: Conjunctivae normal.      Pupils: Pupils are equal, round, and reactive to light.   Neck:      Thyroid: No thyromegaly.      Vascular: No carotid bruit or JVD.   Cardiovascular:      Rate and Rhythm: Normal rate and regular rhythm.      Pulses: Normal pulses.      Heart sounds: Normal heart sounds and S1 normal. No murmur heard.    No gallop.   Pulmonary:      Effort: Pulmonary effort is normal.      Breath sounds: Wheezing present. No decreased breath sounds.   Abdominal:      General: Bowel sounds are normal.      Palpations: Abdomen is soft.      Tenderness: There is abdominal tenderness in the epigastric area. There is no right CVA tenderness or left CVA tenderness.   Musculoskeletal:   "       General: Normal range of motion.      Cervical back: Normal range of motion and neck supple.      Right lower leg: No edema.      Left lower leg: No edema.      Comments: Gait is more unsteady.    Skin:     General: Skin is warm and dry.      Capillary Refill: Capillary refill takes less than 2 seconds.      Coloration: Skin is pale.      Findings: No rash.   Neurological:      Mental Status: He is alert and oriented to person, place, and time.   Psychiatric:         Attention and Perception: Attention normal.         Mood and Affect: Affect normal. Mood is anxious and depressed.         Speech: Speech normal.         Behavior: Behavior normal.      Comments: Expressed his agitation with physical therapy at [facility]. States he got kicked out of therapy. He is in weekly therapy. Was on Trintillex but did not tolerate.        Assessment:       1. Epigastric pain    2. History of peptic ulcer    3. Eosinophilic granulomatosis with polyangiitis (EGPA)    4. Decreased mobility and endurance    5. Pulmonary hypertension associated with vasculitis    6. Thrombocytopenia, unspecified    7. Moderate episode of recurrent major depressive disorder    8. Chronic obstructive pulmonary disease, unspecified COPD type    9. Alcohol use disorder, moderate, in sustained remission    10. Primary hypertension    11. Prediabetes    12. Anxiety        Plan:       Epigastric pain: possible return of ulcers. He is on a lot of medications that can cause stomach upset. On Nexium and pepcid.     History of peptic ulcer: amoxicillin BID for 10 days. Follow up with Gastroenterololgy    Eosinophilic granulomatosis with polyangiitis (EGPA): He expresses anxiety related to this diagnosis.  States finally knows why his issues have been so long and progressing. Misdiagnosed in [place]. Expresses that this is not curable and that just trying to slow the progression and quality of life.     Decreased mobility and endurance: falling more often. Gait  is more unsteady. Wide stance. He was in PT but got kicked out due to argument with staff.  Using cane for support. He has handicap paperwork to take to the [facility]    Pulmonary hypertension associated with vasculitis: followed by Pulmonology, followed by Cardiology.     Thrombocytopenia, unspecified: stable.    Moderate episode of recurrent major depressive disorder: he is seeing therapist weekly. He did not tolerate last antidepressant. Feels therapy helps.    Chronic obstructive pulmonary disease, unspecified COPD type: followed by Pulmonology. Using inhalers    Alcohol use disorder, moderate, in sustained remission: not drinking often.     Primary hypertension: he is not on BP medication. Not needed. BP has been on lower side.     Prediabetes: he is on high doses of Prednisone for prolonged time. In the process of trying to wean down.     Anxiety: in therapy. Not taking any anxiety medications.     Other orders  -     amoxicillin (AMOXIL) 500 MG capsule; Take 1 capsule (500 mg total) by mouth every 12 (twelve) hours.  Dispense: 20 capsule; Refill: 0       Continue current medication  Take medications only as prescribed  Healthy diet, exercise  Adequate rest  Adequate hydration  Avoid allergens  Avoid excessive caffeine      Follow up 3 months.

## 2023-05-08 NOTE — PROGRESS NOTES
Subjective:       Patient ID: Chris Quiroz is a 72 y.o. male.    Chief Complaint: Follow-up    Follow-up  Associated symptoms include arthralgias and weakness. Pertinent negatives include no abdominal pain, chest pain, congestion, coughing, fatigue, fever, headaches, nausea, rash or vomiting.     Here for follow up. He has several issues to review.    He had labs done this morning, results are not available yet.  He is prediabetic but having more excessive thirst, frequent urination. He has been on high dose prednisone since October. He will probably need control of his glucose levels with medication due to high readings from the prednisone. He states that right now the steroids are going to be long term. His last CMP showed a glucose level of 348, that was in February. Will see what labs drawn this morning show. States he has follow up with Pulmonology next week.     He has skin abrasions to his arms that are not healing well. States he has been applying topical lidocaine ointment to keep them from itching. Will give him some bactroban ointment. No indication of infection.     Gait remains unsteady. He is using a cane for support. No recent falls. He is doing the exercises that PT showed him to do.     States he was starting to have more anxiety so he has restarted the Trintellix. He is seeing a therapist weekly. States that really helps.     See ROS.    The following portion of the patients history was reviewed and updated as appropriate: allergies, current medications, past medical and surgical history. Past social history and problem list reviewed. Family PMH and Past social history reviewed. Tobacco, Illicit drug use reviewed.      Review of patient's allergies indicates:   Allergen Reactions    Juniper Shortness Of Breath     States lungs fill up with fluid    Nsaids (non-steroidal anti-inflammatory drug) Other (See Comments)     Gastric ulcers    Cat dander          Current Outpatient Medications:      acetaminophen (TYLENOL) 325 MG tablet, Take 2 tablets (650 mg total) by mouth every 6 (six) hours as needed for Pain., Disp: 60 tablet, Rfl: 0    albuterol-ipratropium (DUO-NEB) 2.5 mg-0.5 mg/3 mL nebulizer solution, TAKE 3 ML BY NEBULIZATION EVERY 6 HOURS AS NEEDED FOR WHEEZE RESCUE Strength: 0.5 MG-3 MG(2.5 mg base)/3 mL, Disp: 360 mL, Rfl: 3    amoxicillin (AMOXIL) 500 MG capsule, Take 1 capsule (500 mg total) by mouth every 12 (twelve) hours., Disp: 20 capsule, Rfl: 0    azelastine (ASTELIN) 137 mcg (0.1 %) nasal spray, 1 spray (137 mcg total) by Nasal route 2 (two) times daily., Disp: 30 mL, Rfl: 11    diphenhydrAMINE (BENADRYL) 25 mg capsule, Take 25 mg by mouth 2 (two) times daily as needed., Disp: , Rfl:     esomeprazole (NEXIUM) 40 MG capsule, Take 1 capsule by mouth once daily., Disp: , Rfl:     fluticasone propionate (FLONASE) 50 mcg/actuation nasal spray, 1 spray (50 mcg total) by Each Nostril route once daily., Disp: 18.2 mL, Rfl: 3    furosemide (LASIX) 20 MG tablet, Take 1 tablet (20 mg total) by mouth daily as needed (lower extremity edema)., Disp: 60 tablet, Rfl: 2    mepolizumab (NUCALA) 100 mg/mL Syrg, Inject 3 mLs (300 mg total) into the skin every 28 days., Disp: 300 mL, Rfl: 0    montelukast (SINGULAIR) 10 mg tablet, Take 1 tablet (10 mg total) by mouth every evening., Disp: 30 tablet, Rfl: 6    predniSONE (DELTASONE) 20 MG tablet, Take 3.5 tablets (70 mg total) by mouth once daily., Disp: 105 tablet, Rfl: 2    rosuvastatin (CRESTOR) 10 MG tablet, Take 1 tablet (10 mg total) by mouth once daily., Disp: 90 tablet, Rfl: 3    vortioxetine (TRINTELLIX) 10 mg Tab, Take 1 tablet (10 mg total) by mouth once daily., Disp: 90 tablet, Rfl: 3    famotidine (PEPCID) 40 MG tablet, Take 1 tablet (40 mg total) by mouth every evening. (Patient not taking: Reported on 5/8/2023), Disp: 90 tablet, Rfl: 3    glucosamine-chondroitin 500-400 mg tablet, Take 1 tablet by mouth as needed. , Disp: , Rfl:      levocetirizine (XYZAL) 5 MG tablet, Take 1 tablet (5 mg total) by mouth every evening. (Patient not taking: Reported on 5/8/2023), Disp: 90 tablet, Rfl: 3    melatonin 5 mg Tab, Take 2 mg by mouth nightly. , Disp: , Rfl:     sildenafiL (VIAGRA) 50 MG tablet, Take 50 mg by mouth daily as needed., Disp: , Rfl:     sulfamethoxazole-trimethoprim 400-80mg (BACTRIM,SEPTRA) 400-80 mg per tablet, Take 1 tablet by mouth once daily. (Patient not taking: Reported on 3/28/2023), Disp: 30 tablet, Rfl: 3    Current Facility-Administered Medications:     mepolizumab (NUCALA) injection 100 mg, 100 mg, Subcutaneous, Q28 Days, Joesph Fofana MD, 100 mg at 04/17/23 1006    mepolizumab (NUCALA) injection 100 mg, 100 mg, Subcutaneous, Q28 Days, Joesph Fofana MD, 100 mg at 04/17/23 1005    mepolizumab (NUCALA) injection 100 mg, 100 mg, Subcutaneous, Q28 Days, Joesph Fofana MD, 100 mg at 04/17/23 1005    Past Medical History:   Diagnosis Date    Anxiety     Anxiety     Atherosclerosis of coronary artery     per CT scan dated 7/13/18    Depression     Diverticulosis of intestine without bleeding     GERD (gastroesophageal reflux disease)     HTN (hypertension)     Hyperlipidemia     Hypertension     Plantar fasciitis     Thoracic aorta atherosclerosis     per CT dated 7/13/18. sent for scanning    Vitamin D deficiency     Vitamin D deficiency        Past Surgical History:   Procedure Laterality Date    COLONOSCOPY      ESOPHAGOGASTRODUODENOSCOPY Left 12/7/2018    Procedure: EGD (ESOPHAGOGASTRODUODENOSCOPY);  Surgeon: Josiah Cuadra MD;  Location: [facility];  Service: Endoscopy;  Laterality: Left;    ESOPHAGOGASTRODUODENOSCOPY N/A 2/28/2021    Procedure: EGD (ESOPHAGOGASTRODUODENOSCOPY);  Surgeon: James Martines Jr., MD;  Location: [facility];  Service: Endoscopy;  Laterality: N/A;    Right Hand Surgery      TONSILLECTOMY      UMBILICAL HERNIA REPAIR N/A 5/19/2021    Procedure: REPAIR, HERNIA, UMBILICAL WITH MESH;  Surgeon: Hardik  "TERESE Ruiz MD;  Location: [facility] OR;  Service: General;  Laterality: N/A;       Social History     Socioeconomic History    Marital status:    Tobacco Use    Smoking status: Former     Packs/day: 2.00     Types: Cigarettes    Smokeless tobacco: Never    Tobacco comments:     Age Started: 22 Age Quit: 51   Substance and Sexual Activity    Alcohol use: Not Currently     Comment: once in a while    Drug use: No     Comment: Tried  CBD     Sexual activity: Never         Review of Systems   Constitutional:  Positive for activity change and appetite change (states eating a lot of sweets and drinking a lot of caffeine since not drinking ETOH). Negative for fatigue and fever.   HENT:  Positive for rhinorrhea. Negative for congestion and trouble swallowing.    Eyes:  Negative for visual disturbance.   Respiratory:  Positive for shortness of breath. Negative for cough, chest tightness and wheezing.    Cardiovascular:  Positive for palpitations. Negative for chest pain and leg swelling.   Gastrointestinal:  Negative for abdominal pain, blood in stool, diarrhea, nausea and vomiting.   Genitourinary:  Negative for difficulty urinating.   Musculoskeletal:  Positive for arthralgias and gait problem. Negative for back pain.   Skin:  Negative for rash.        Lesions to arms   Neurological:  Positive for weakness. Negative for dizziness and headaches.   Hematological:  Negative for adenopathy. Does not bruise/bleed easily.   Psychiatric/Behavioral:  Positive for dysphoric mood. Negative for decreased concentration and sleep disturbance. The patient is nervous/anxious.      Objective:      /68 (BP Location: Left arm, Patient Position: Sitting)   Pulse 104   Temp 98.2 °F (36.8 °C)   Resp 16   Ht 5' 10" (1.778 m)   Wt 83 kg (182 lb 14 oz)   SpO2 97%   BMI 26.24 kg/m²      Physical Exam  Constitutional:       Appearance: Normal appearance. He is overweight.   HENT:      Head: Normocephalic.   Neck:      Thyroid: No " thyromegaly.      Vascular: No carotid bruit.   Cardiovascular:      Rate and Rhythm: Normal rate and regular rhythm.      Pulses: Normal pulses.      Heart sounds: Normal heart sounds.   No systolic murmur is present.   No diastolic murmur is present.   Pulmonary:      Effort: Pulmonary effort is normal.      Breath sounds: Normal breath sounds. No wheezing.   Abdominal:      General: Bowel sounds are normal.      Tenderness: There is no abdominal tenderness.   Musculoskeletal:      Cervical back: Normal range of motion.      Right lower leg: No edema.      Left lower leg: No edema.      Comments: Gait steady with use of a cane for support   Skin:     General: Skin is warm and dry.      Capillary Refill: Capillary refill takes less than 2 seconds.      Comments: Multiple skin abrasions, wounds to bilateral hands and arms. No signs of infection.    Neurological:      General: No focal deficit present.      Mental Status: He is alert.   Psychiatric:         Attention and Perception: Attention and perception normal.         Mood and Affect: Mood and affect normal.         Speech: Speech normal.         Behavior: Behavior normal.       Assessment:       1. Primary hypertension    2. Prostate cancer screening    3. Mixed hyperlipidemia    4. Prediabetes    5. Eosinophilic granulomatosis with polyangiitis (EGPA)    6. Chronic obstructive pulmonary disease, unspecified COPD type    7. Impairment of balance    8. Moderate episode of recurrent major depressive disorder    9. Alcohol use disorder, moderate, in sustained remission        Plan:       Primary hypertension: Good Control. Continue current medications.     Prostate cancer screening  -     PSA, Screening    Mixed hyperlipidemia: tolerating statin.  Good Control. Continue current medications.     Lab Results   Component Value Date    LDLCALC 58.6 (L) 11/16/2022        Prediabetes: his glucose levels have been elevated since being on high dose steroids. May need to  start on medication for control. Will see what labs show.    Eosinophilic granulomatosis with polyangiitis (EGPA): continue to follow with Pulmonology    Chronic obstructive pulmonary disease, unspecified COPD type: on steroids. Using inhaler    Impairment of balance: does not want to go back to PT. Using cane for stability. Doing home exercises.    Moderate episode of recurrent major depressive disorder: seeing therapist weekly. On Trintellix.     Alcohol use disorder, moderate, in sustained remission: not drinking     Other orders  -     mupirocin (BACTROBAN) 2 % ointment; Apply topically 2 (two) times daily.  Dispense: 30 g; Refill: 2       Continue current medication  Take medications only as prescribed  Healthy diet, exercise  Adequate rest  Adequate hydration  Avoid allergens  Avoid excessive caffeine      Follow up 2 months.

## 2023-05-09 NOTE — PROGRESS NOTES
Subjective:       Patient ID: Chris Quiroz is a 72 y.o. male.    Chief Complaint: Diabetes    HPI here for follow up after labs completed. His HgbA1c is >14. Here to discuss medications. He has been on high dose steroids. He was prediabetic before starting the steroid treatment. Will be important to get this under control. Discussed treatment options with Dr. Michael. Recommended starting on Glyxambi.  He will need to monitor his glucose levels twice daily and follow up with me in one week with readings. Will refer to Diabetic educator. Discussed importance for diet.  See ROS.    The following portion of the patients history was reviewed and updated as appropriate: allergies, current medications, past medical and surgical history. Past social history and problem list reviewed. Family PMH and Past social history reviewed. Tobacco, Illicit drug use reviewed.      Review of patient's allergies indicates:   Allergen Reactions    Juniper Shortness Of Breath     States lungs fill up with fluid    Nsaids (non-steroidal anti-inflammatory drug) Other (See Comments)     Gastric ulcers    Cat dander          Current Outpatient Medications:     acetaminophen (TYLENOL) 325 MG tablet, Take 2 tablets (650 mg total) by mouth every 6 (six) hours as needed for Pain., Disp: 60 tablet, Rfl: 0    albuterol-ipratropium (DUO-NEB) 2.5 mg-0.5 mg/3 mL nebulizer solution, TAKE 3 ML BY NEBULIZATION EVERY 6 HOURS AS NEEDED FOR WHEEZE RESCUE Strength: 0.5 MG-3 MG(2.5 mg base)/3 mL, Disp: 360 mL, Rfl: 3    azelastine (ASTELIN) 137 mcg (0.1 %) nasal spray, 1 spray (137 mcg total) by Nasal route 2 (two) times daily., Disp: 30 mL, Rfl: 11    diphenhydrAMINE (BENADRYL) 25 mg capsule, Take 25 mg by mouth 2 (two) times daily as needed., Disp: , Rfl:     esomeprazole (NEXIUM) 40 MG capsule, Take 1 capsule by mouth once daily., Disp: , Rfl:     fluticasone propionate (FLONASE) 50 mcg/actuation nasal spray, 1 spray (50 mcg total) by Each Nostril  route once daily., Disp: 18.2 mL, Rfl: 3    glucosamine-chondroitin 500-400 mg tablet, Take 1 tablet by mouth as needed. , Disp: , Rfl:     melatonin 5 mg Tab, Take 2 mg by mouth nightly. , Disp: , Rfl:     mepolizumab (NUCALA) 100 mg/mL Syrg, Inject 3 mLs (300 mg total) into the skin every 28 days., Disp: 300 mL, Rfl: 0    montelukast (SINGULAIR) 10 mg tablet, Take 1 tablet (10 mg total) by mouth every evening., Disp: 30 tablet, Rfl: 6    mupirocin (BACTROBAN) 2 % ointment, Apply topically 2 (two) times daily., Disp: 30 g, Rfl: 2    predniSONE (DELTASONE) 20 MG tablet, Take 3.5 tablets (70 mg total) by mouth once daily., Disp: 105 tablet, Rfl: 2    rosuvastatin (CRESTOR) 10 MG tablet, Take 1 tablet (10 mg total) by mouth once daily., Disp: 90 tablet, Rfl: 3    sildenafiL (VIAGRA) 50 MG tablet, Take 50 mg by mouth daily as needed., Disp: , Rfl:     vortioxetine (TRINTELLIX) 10 mg Tab, Take 1 tablet (10 mg total) by mouth once daily., Disp: 90 tablet, Rfl: 3    blood sugar diagnostic Strp, For twice daily glucose checks.  Strips and lancets, Disp: 60 strip, Rfl: 6    blood-glucose meter (BLOOD GLUCOSE MONITORING) kit, Use as instructed for BID glucose checks, Disp: 1 each, Rfl: 0    empagliflozin-linagliptin (GLYXAMBI) 10-5 mg Tab, Take 1 tablet by mouth once daily., Disp: 30 tablet, Rfl: 4    Current Facility-Administered Medications:     mepolizumab (NUCALA) injection 100 mg, 100 mg, Subcutaneous, Q28 Days, Joesph Fofana MD, 100 mg at 04/17/23 1006    mepolizumab (NUCALA) injection 100 mg, 100 mg, Subcutaneous, Q28 Days, Joesph Fofana MD, 100 mg at 04/17/23 1005    mepolizumab (NUCALA) injection 100 mg, 100 mg, Subcutaneous, Q28 Days, Joesph Fofana MD, 100 mg at 04/17/23 1005    Past Medical History:   Diagnosis Date    Anxiety     Anxiety     Atherosclerosis of coronary artery     per CT scan dated 7/13/18    Depression     Diverticulosis of intestine without bleeding     GERD (gastroesophageal reflux disease)      HTN (hypertension)     Hyperlipidemia     Hypertension     Plantar fasciitis     Thoracic aorta atherosclerosis     per CT dated 7/13/18. sent for scanning    Vitamin D deficiency     Vitamin D deficiency        Past Surgical History:   Procedure Laterality Date    COLONOSCOPY      ESOPHAGOGASTRODUODENOSCOPY Left 12/7/2018    Procedure: EGD (ESOPHAGOGASTRODUODENOSCOPY);  Surgeon: Josiah Cuadra MD;  Location: [facility];  Service: Endoscopy;  Laterality: Left;    ESOPHAGOGASTRODUODENOSCOPY N/A 2/28/2021    Procedure: EGD (ESOPHAGOGASTRODUODENOSCOPY);  Surgeon: James Martines Jr., MD;  Location: [facility];  Service: Endoscopy;  Laterality: N/A;    Right Hand Surgery      TONSILLECTOMY      UMBILICAL HERNIA REPAIR N/A 5/19/2021    Procedure: REPAIR, HERNIA, UMBILICAL WITH MESH;  Surgeon: Hardik Ruiz MD;  Location: [facility] OR;  Service: General;  Laterality: N/A;       Social History     Socioeconomic History    Marital status:    Tobacco Use    Smoking status: Former     Packs/day: 2.00     Types: Cigarettes    Smokeless tobacco: Never    Tobacco comments:     Age Started: 22 Age Quit: 51   Substance and Sexual Activity    Alcohol use: Not Currently     Comment: once in a while    Drug use: No     Comment: Tried  CBD     Sexual activity: Never     Review of Systems    Constitutional:  Positive for activity change and appetite change (states eating a lot of sweets and drinking a lot of caffeine since not drinking ETOH). Negative for fatigue and fever.   HENT:  Positive for rhinorrhea. Negative for congestion and trouble swallowing.    Eyes:  Negative for visual disturbance.   Respiratory:  Positive for shortness of breath. Negative for cough, chest tightness and wheezing.    Cardiovascular:  Positive for palpitations. Negative for chest pain and leg swelling.   Gastrointestinal:  Negative for abdominal pain, blood in stool, diarrhea, nausea and vomiting.   Genitourinary:  Negative for difficulty  "urinating.   Musculoskeletal:  Positive for arthralgias and gait problem. Negative for back pain.   Skin:  Negative for rash.        Lesions to arms   Neurological:  Positive for weakness. Negative for dizziness and headaches.   Hematological:  Negative for adenopathy. Does not bruise/bleed easily.   Psychiatric/Behavioral:  Positive for dysphoric mood. Negative for decreased concentration and sleep disturbance. The patient is nervous/anxious.       Objective:      /68   Pulse 92   Temp 98.4 °F (36.9 °C)   Resp 20   Ht 5' 10" (1.778 m)   Wt 86.1 kg (189 lb 14.8 oz)   SpO2 97%   BMI 27.25 kg/m²      Physical Exam    Constitutional:       Appearance: Normal appearance. He is overweight.   HENT:      Head: Normocephalic.   Neck:      Thyroid: No thyromegaly.      Vascular: No carotid bruit.   Cardiovascular:      Rate and Rhythm: Normal rate and regular rhythm.      Pulses: Normal pulses.      Heart sounds: Normal heart sounds.   No systolic murmur is present.   No diastolic murmur is present.   Pulmonary:      Effort: Pulmonary effort is normal.      Breath sounds: Normal breath sounds. No wheezing.   Abdominal:      General: Bowel sounds are normal.      Tenderness: There is no abdominal tenderness.   Musculoskeletal:      Cervical back: Normal range of motion.      Right lower leg: No edema.      Left lower leg: No edema.      Comments: Gait steady with use of a cane for support   Skin:     General: Skin is warm and dry.      Capillary Refill: Capillary refill takes less than 2 seconds.      Comments: Multiple skin abrasions, wounds to bilateral hands and arms. No signs of infection.    Neurological:      General: No focal deficit present.      Mental Status: He is alert.   Psychiatric:         Attention and Perception: Attention and perception normal.         Mood and Affect: Mood and affect normal.         Speech: Speech normal.         Behavior: Behavior normal.   Assessment:       1. Steroid-induced " diabetes mellitus, initial encounter        Plan:       Steroid-induced diabetes mellitus, initial encounter  -     empagliflozin-linagliptin (GLYXAMBI) 10-5 mg Tab; Take 1 tablet by mouth once daily.  Dispense: 30 tablet; Refill: 4  -     Ambulatory referral/consult to Diabetes Education; Future; Expected date: 05/16/2023  -     blood-glucose meter (BLOOD GLUCOSE MONITORING) kit; Use as instructed for BID glucose checks  Dispense: 1 each; Refill: 0  -     blood sugar diagnostic Strp; For twice daily glucose checks.  Strips and lancets  Dispense: 60 strip; Refill: 6    I spent a total of 20 minutes on the day of the visit.     This includes face to face time with the patient, as well as non-face to face time preparing for and completing the visit (review of prior diagnostic testing and clinical notes, obtaining or reviewing history, documenting clinical information in the EMR, independently interpreting and communicating results to the patient/family and coordinating ongoing care).         Continue current medication  Take medications only as prescribed  Healthy diet, exercise  Adequate rest  Adequate hydration  Avoid allergens  Avoid excessive caffeine      Follow up one week

## 2023-05-09 NOTE — TELEPHONE ENCOUNTER
Called pt and read him [facility]'s pt portal message.  Appt scheduled with [facility] for today.  Pt verbalizes understanding.

## 2023-05-11 NOTE — PROGRESS NOTES
Diabetes Care Specialist Progress Note  Author: Viviana Blake RD, CDE  Date: 5/11/2023    Program Intake  Reason for Diabetes Program Visit:: Initial Diabetes Assessment  Current diabetes risk level:: high  In the last 12 months, have you:: none    Lab Results   Component Value Date    HGBA1C >14.0 (H) 05/08/2023     Clinical    Problem Review  Reviewed Problem List with Patient: yes  Active comorbidities affecting diabetes self-care.: yes  Comorbidities: Hypertension, Cardiovascular Disease, Other (comment) (hyperlipidemia)  Reviewed health maintenance: yes    Clinical Assessment  Current Diabetes Treatment: Oral Medication (glyxambi 10-5 mg (1 tablet daily--prescribed by PCP on 5/9/23 and pharmacy will be able to fill later today and patient to initiate tomorrow morning)--high dose steroids initiated in Oct 2022 and patient remains on daily oral steroids, current dose 60 mg)  Have you ever experienced hypoglycemia (low blood sugar)?: no  Have you ever experienced hyperglycemia (high blood sugar)?: no    Medication Information  How do you obtain your medications?: Patient drives  How many days a week do you miss your medications?: Never  Do you use a pill box or medication chart to help you manage your medications?: No  Do you sometimes have difficulty refilling your medications?: No  Medication adherence impacting ability to self-manage diabetes?: No    Labs  Do you have regular lab work to monitor your medications?: No (Patient now resuming regular PCP care, missed last 1-2 years)  Lab Compliance Barriers: No    Nutritional Status  Diet: Regular (Irregular meal pattern--may eat one full meal daily (ex-wife prepares) but snacks throughout the day. Does report increased milkshakes and Kristian's pieces up until PCP visit 3 days ago.)  Meal Plan 24 Hour Recall: Breakfast, Snack (Eats 1-2 meals daily, increased small snacking throughout the day. Drinks V8 juice, water, pineapple/orange juice each morning, and makes  smoothies with milk)  Meal Plan 24 Hour Recall - Breakfast: oatmeal made with milk, water or buttermilk (adds butter to oatmeal) OR grapenuts with whole milk + yogurt added to top  Meal Plan 24 Hour Recall - Snack: No lunch or dinner--snacked on avocado, tomatoes, strawberries  Change in appetite?: Yes (Decreased as he reports taste is diminished)  Dentation:: Intact  Recent Changes in Weight: No Recent Weight Change  Current nutritional status an area of need that is impacting patient's ability to self-manage diabetes?: Yes (Discussed further reducing intake of sweetened beverages (simple sugar))    Additional Social History    Support  Does anyone support you with your diabetes care?: yes  Who supports you?: self  Who takes you to your medical appointments?: self  Does the current support meet the patient's needs?: Yes  Is Support an area impacting ability to self-manage diabetes?: No    Access to Mass Media & Technology  Does the patient have access to any of the following devices or technologies?: Smart phone  Media or technology needs impacting ability to self-manage diabetes?: No    Cognitive/Behavioral Health  Alert and Oriented: Yes  Difficulty Thinking: No  Requires Prompting: No  Requires assistance for routine expression?: No  Cognitive or behavioral barriers impacting ability to self-manage diabetes?: No    Culture/Nondenominational  Culture or Religion beliefs that may impact ability to access healthcare: No    Communication  Language preference: English  Hearing Problems: No  Vision Problems: Yes  Vision problem type:: Decreased Vision  Vision Assistance: Glasses  Communication needs impacting ability to self-manage diabetes?: No    Health Literacy  Preferred Learning Method: Face to Face, Reading Materials  How often do you need to have someone help you read instructions, pamphlets, or written material from your doctor or pharmacy?: Never  Health literacy needs impacting ability to self-manage diabetes?:  No    Diabetes Self-Management Skills Assessment    Diabetes Disease Process/Treatment Options  Patient/caregiver able to state what happens when someone has diabetes.: yes (Patient states he comes from a family with plenty of diabetes present)  Patient/caregiver knows what type of diabetes they have.: yes  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)  Patient/caregiver able to identify at least three signs and symptoms of diabetes.: no  Patient able to identify at least three risk factors for diabetes.: yes  Identified risk factors:: family history, age over 40  Diabetes Disease Process/Treatment Options: Skills Assessment Completed: Yes  Assessment indicates:: Instruction Needed  Area of need?: Yes    Nutrition/Healthy Eating  Challenges to healthy eating:: snacking between meals and at night, other (see comments) (increase intake of sweets)  Method of carbohydrate measurement:: no knowledge of what carbs are  Patient can identify foods that impact blood sugar.: no (see comments) (patient states he has no idea how he should be eating)  Nutrition/Healthy Eating Skills Assessment Completed:: Yes  Assessment indicates:: Knowledge deficit, Instruction Needed  Area of need?: Yes    Physical Activity/Exercise  Physical Activity/Exercise Skills Assessment Completed: : No  Deferred due to:: Time    Medications  Patient is able to describe current diabetes management routine.: yes  Diabetes management routine:: diet, oral medications (will  DM medication (Glyxambi) prescribed today and initiate tomorrow)  Patient is able to identify current diabetes medications, dosages, and appropriate timing of medications.: yes  Patient understands the purpose of the medications taken for diabetes.: no (discussed how glyxambi works to decrease glucose levels)  Patient reports problems or concerns with current medication regimen.: no  Medication Skills Assessment Completed:: Yes  Assessment  indicates:: Adequate understanding  Area of need?: No    Home Blood Glucose Monitoring  Patient states that blood sugar is checked at home daily.: no  Reasons for not monitoring:: unsure how to use equipment (patient did not bring glucometer to visit for training--he states his ex wife will stop by today to assist with use of glucometer and initiate home glucose monitoring)  Home Blood Glucose Monitoring Skills Assessment Completed: : Yes  Assessment indicates:: Instruction Needed  Area of need?: Yes    Acute Complications  Acute Complications Skills Assessment Completed: : No  Deferred due to:: Time    Chronic Complications  Patient can identify major chronic complications of diabetes.: yes  Stated chronic complications:: heart disease/heart attack  Patient can identify ways to prevent or delay diabetes complications.: yes  Stated ways to prevent complications:: healthy eating and regular activity, controlling blood sugar  Patient is aware that having diabetes increases risk of heart disease?: Yes  Patient is aware that heart disease is the leading cause of death and disability in people with diabetes?: Yes  Patient able to state risk factors for heart disease?: Yes  Patient stated risk factors for heart disease:: Having diabetes, High blood pressure, High cholesterol, Diet  Patient is taking statin?: Yes  Chronic Complications Skills Assessment Completed: : Yes  Assessment indicates:: Adequate understanding  Area of need?: No    Psychosocial/Coping  Patient can identify ways of coping with chronic disease.: yes  Patient-stated ways of coping with chronic disease:: support from loved ones  Psychosocial/Coping Skills Assessment Completed: : Yes  Assessment indicates:: Adequate understanding  Area of need?: No    Assessment Summary and Plan    Based on today's diabetes care assessment, the following areas of need were identified:      Social 5/11/2023   Support No   Access to Mass Media/Tech No   Cognitive/Behavioral  Health No   Culture/Druze No   Communication No   Health Literacy No      Clinical 5/11/2023   Medication Adherence No   Lab Compliance No   Nutritional Status Yes--has already reduced intake of simple sugar over past 3 days but will work to further eliminate sweetened beverages      Diabetes Self-Management Skills 5/11/2023   Diabetes Disease Process/Treatment Options Yes--see care plan, diagnosis of steroid induced diabetes based on 5/8/23 A1c of >14%, high dose steroids initiated Oct 2022.   Nutrition/Healthy Eating Yes--see care plan   Medication No--will initiate Glyxambi tomorrow, recently prescribed   Home Blood Glucose Monitoring Yes--see care plan, new glucometer received   Chronic Complications No   Psychosocial/Coping No      Today's interventions were provided through individual discussion, instruction, and written materials were provided.      Patient verbalized understanding of instruction and written materials.  Pt was able to return back demonstration of instructions today. Patient understood key points, needs reinforcement and further instruction.     Diabetes Self-Management Care Plan:    Today's Diabetes Self-Management Care Plan was developed with Chris's input. Chris has agreed to work toward the following goal(s) to improve his/her overall diabetes control.      Care Plan: Diabetes Management   Updates made since 4/11/2023 12:00 AM     Problem: Blood Glucose Self-Monitoring         Goal: Patient agrees to check and record blood sugars 2 times per day--fasting in the morning and before dinner.  Has new glucometer at home and ex-wife will assist with use and begin tonight. Bring completed glucose logs to all visits for review.    Start Date: 5/11/2023   Expected End Date: 6/2/2023   Priority: High   Barriers: Knowledge deficit     Task: Reviewed the importance of self-monitoring blood glucose and keeping logs. Completed 5/11/2023        Task: Instructed on how to self-monitor blood glucose using  a home glucometer, how to properly dispose of used strips and lancets after use, and how to appropriately store meter and supplies. Completed 5/11/2023        Task: Provided patient with blood glucose logs, reviewed appropriate timing and frequency to SMBG, education on parameters on when to notify provider and advised patient to bring logs to all appts with PCP/Endocrinologist/Diabetes Care Specialist. Completed 5/11/2023        Task: Discussed ways to minimize pain when monitoring blood glucose. Completed 5/11/2023        Problem: Healthy Eating         Goal: Eliminate simple carbohdrates (fruit juice, smoothies).  Begin incorporating protein source at all meals and snacks. Reviewed plate method (balanced eating).    Start Date: 5/11/2023   Expected End Date: 6/2/2023   Priority: Medium   Barriers: Knowledge deficit     Task: Reviewed the sources and role of Carbohydrate, Protein, and Fat and how each nutrient impacts blood sugar. Completed 5/11/2023        Task: Provided visual examples using dry measuring cups, food models, and other familiar objects such as computer mouse, deck or cards, tennis ball etc. to help with visualization of portions. Completed 5/11/2023        Task: Explained how to count carbohydrates using the food label and the use of dry measuring cups for accurate carb counting. Completed 5/11/2023        Task: Recommended replacing beverages containing high sugar content with noncaloric/sugar free options and/or water. Completed 5/11/2023        Task: Review the importance of balancing carbohydrates with each meal using portion control techniques to count servings of carbohydrate and label reading to identify serving size and amount of total carbs per serving. Completed 5/11/2023        Task: Provided Sample plate method and reviewed the use of the plate to estimate amounts of carbohydrate per meal. Completed 5/11/2023        Problem: Disease Process         Goal: Patient agrees to take steps  toward understanding the diabetes disease process and treatment options by initiating DM medications and improving diet to reduce carbohydrate intake.    Start Date: 5/11/2023   Expected End Date: 6/2/2023   Priority: Low   Barriers: No Barriers Identified     Task: Provided a basic introduction of the diabetes disease process, diagnosis, progression, and how diabetes can be successfully managed. Completed 5/11/2023        Task: Reviewed the following risk factors associated with diabetes: Being overweight, family history, reduced activity, ethnicity, age over 40, past history of Gestational DM Completed 5/11/2023        Task: Reviewed the following common signs and symptoms of diabetes:  Increased Thirst, Frequent Urination, Fatigue, Sexual Dysfunction, Blurred Vision, Frequent and Slow healing of infections Completed 5/11/2023        Task: Reviewed different types of diabetes Type 1, Type 2, Gestational, Prediabetes, and other forms, and described how each type is managed and reviewed different myths and stereotypes commonly associated with diabetes. Completed 5/11/2023        Task: Emphasized on the importance of controlling diabetes to prevent complications and reviewed both the short-term and long-term effects of uncontrolled diabetes. Completed 5/11/2023        Follow Up Plan     Follow up in 22 days (on 6/2/2023) for continued DSMES--has PCP follow up 5/19/23.  Reassess monitoring, DM meds, and diet. Assess physical activity (does ambulate with a cane) and acute complications of diabetes.  Patient reports high dose oral steroids initiated in October 2022 and he has had symptoms of diabetes for a few months.  Current oral prednisone dose 60 mg daily. Will initiate glyxambi tomorrow (DM medication prescribed by PCP at 5/9/23 visit and pharmacy took a couple of days to get in stock).  Obtained new glucometer--did not bring to visit for help with how to use, he states his ex-wife will be over today and can assist  with use of glucometer.  Patient to start checking glucose levels today--recommend checking glucose 2 times daily (fasting and before evening meal). Goal glucose 100-150 mg/dL.  Patient has made diet changes over past 3 days stating he was overdoing milkshakes and Kristian's pieces.  Patient not aware how to eat so much of appointment spent today meal planning. Priority to stop all sweetened beverages (fruit juice, etc) now.  Initiate a more balanced approach to eating by including protein source at meals and snacks--avoid meals and/or snacks (currently snacking on strawberries) with only carbs.  Practiced meal planning and patient feels he can incorporate better foods into his diet.      Today's care plan and follow up schedule was discussed with patient.  Chris verbalized understanding of the care plan, goals, and agrees to follow up plan.        The patient was encouraged to communicate with his/her health care provider/physician and care team regarding his/her condition(s) and treatment.  I provided the patient with my contact information today and encouraged to contact me via phone or [facility] Patient Portal as needed.     Length of Visit   Total Time: 60 Minutes

## 2023-05-19 PROBLEM — R73.03 PREDIABETES: Status: RESOLVED | Noted: 2022-09-19 | Resolved: 2023-01-01

## 2023-05-19 PROBLEM — E11.8 DIABETES MELLITUS TYPE 2 WITH COMPLICATIONS: Status: ACTIVE | Noted: 2023-01-01

## 2023-05-19 NOTE — PROGRESS NOTES
Subjective:       Patient ID: Chris Quiroz is a 72 y.o. male.    Chief Complaint: 1 week f/u    HPI recent diagnosis of DM. Started on medication. Here for one week follow up. He only started on medication a few days ago. He is watching his diet, hydrating well. He is tolerating the medication so far. See ROS.    The following portion of the patients history was reviewed and updated as appropriate: allergies, current medications, past medical and surgical history. Past social history and problem list reviewed. Family PMH and Past social history reviewed. Tobacco, Illicit drug use reviewed.      Review of patient's allergies indicates:   Allergen Reactions    Juniper Shortness Of Breath     States lungs fill up with fluid    Nsaids (non-steroidal anti-inflammatory drug) Other (See Comments)     Gastric ulcers    Cat dander          Current Outpatient Medications:     acetaminophen (TYLENOL) 325 MG tablet, Take 2 tablets (650 mg total) by mouth every 6 (six) hours as needed for Pain., Disp: 60 tablet, Rfl: 0    albuterol-ipratropium (DUO-NEB) 2.5 mg-0.5 mg/3 mL nebulizer solution, TAKE 3 ML BY NEBULIZATION EVERY 6 HOURS AS NEEDED FOR WHEEZE RESCUE Strength: 0.5 MG-3 MG(2.5 mg base)/3 mL, Disp: 360 mL, Rfl: 3    azelastine (ASTELIN) 137 mcg (0.1 %) nasal spray, 1 spray (137 mcg total) by Nasal route 2 (two) times daily., Disp: 30 mL, Rfl: 11    blood sugar diagnostic Strp, For twice daily glucose checks.  Strips and lancets, Disp: 60 strip, Rfl: 6    blood-glucose meter (BLOOD GLUCOSE MONITORING) kit, Use as instructed for BID glucose checks, Disp: 1 each, Rfl: 0    diphenhydrAMINE (BENADRYL) 25 mg capsule, Take 25 mg by mouth 2 (two) times daily as needed., Disp: , Rfl:     empagliflozin-linagliptin (GLYXAMBI) 10-5 mg Tab, Take 1 tablet by mouth once daily., Disp: 30 tablet, Rfl: 4    esomeprazole (NEXIUM) 40 MG capsule, Take 1 capsule by mouth once daily., Disp: , Rfl:     fluticasone propionate (FLONASE) 50  mcg/actuation nasal spray, 1 spray (50 mcg total) by Each Nostril route once daily., Disp: 18.2 mL, Rfl: 3    mepolizumab (NUCALA) 100 mg/mL Syrg, Inject 3 mLs (300 mg total) into the skin every 28 days., Disp: 300 mL, Rfl: 0    montelukast (SINGULAIR) 10 mg tablet, Take 1 tablet (10 mg total) by mouth every evening., Disp: 30 tablet, Rfl: 6    mupirocin (BACTROBAN) 2 % ointment, Apply topically 2 (two) times daily., Disp: 30 g, Rfl: 2    predniSONE (DELTASONE) 20 MG tablet, Take 3.5 tablets (70 mg total) by mouth once daily., Disp: 105 tablet, Rfl: 2    rosuvastatin (CRESTOR) 10 MG tablet, Take 1 tablet (10 mg total) by mouth once daily., Disp: 90 tablet, Rfl: 3    sildenafiL (VIAGRA) 50 MG tablet, Take 50 mg by mouth daily as needed., Disp: , Rfl:     vortioxetine (TRINTELLIX) 10 mg Tab, Take 1 tablet (10 mg total) by mouth once daily., Disp: 90 tablet, Rfl: 3    glucosamine-chondroitin 500-400 mg tablet, Take 1 tablet by mouth as needed. , Disp: , Rfl:     melatonin 5 mg Tab, Take 2 mg by mouth nightly. , Disp: , Rfl:     Current Facility-Administered Medications:     mepolizumab (NUCALA) injection 100 mg, 100 mg, Subcutaneous, Q28 Days, Joesph Fofana MD, 100 mg at 04/17/23 1006    mepolizumab (NUCALA) injection 100 mg, 100 mg, Subcutaneous, Q28 Days, Joesph Fofana MD, 100 mg at 04/17/23 1005    mepolizumab (NUCALA) injection 100 mg, 100 mg, Subcutaneous, Q28 Days, Joesph Fofana MD, 100 mg at 04/17/23 1005    Past Medical History:   Diagnosis Date    Anxiety     Anxiety     Atherosclerosis of coronary artery     per CT scan dated 7/13/18    Depression     Diverticulosis of intestine without bleeding     GERD (gastroesophageal reflux disease)     HTN (hypertension)     Hyperlipidemia     Hypertension     Plantar fasciitis     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 sent for scanning    Vitamin D deficiency     Vitamin D deficiency        Past  "Surgical History:   Procedure Laterality Date    COLONOSCOPY      ESOPHAGOGASTRODUODENOSCOPY Left 12/7/2018    Procedure: EGD (ESOPHAGOGASTRODUODENOSCOPY);  Surgeon: Josiah Cuadra MD;  Location: [facility];  Service: Endoscopy;  Laterality: Left;    ESOPHAGOGASTRODUODENOSCOPY N/A 2/28/2021    Procedure: EGD (ESOPHAGOGASTRODUODENOSCOPY);  Surgeon: James Martines Jr., MD;  Location: [facility];  Service: Endoscopy;  Laterality: N/A;    Right Hand Surgery      TONSILLECTOMY      UMBILICAL HERNIA REPAIR N/A 5/19/2021    Procedure: REPAIR, HERNIA, UMBILICAL WITH MESH;  Surgeon: Hardik Ruiz MD;  Location: [facility];  Service: General;  Laterality: N/A;       Social History     Socioeconomic History    Marital status:    Tobacco Use    Smoking status: Former     Packs/day: 2.00     Types: Cigarettes    Smokeless tobacco: Never    Tobacco comments:     Age Started: 22 Age Quit: 51   Substance and Sexual Activity    Alcohol use: Not Currently     Comment: once in a while    Drug use: No     Comment: Tried  CBD     Sexual activity: Never     Review of Systems   Constitutional:  Positive for fatigue. Negative for fever.   Respiratory:  Negative for cough and shortness of breath.    Cardiovascular:  Negative for chest pain and palpitations.   Gastrointestinal:  Negative for abdominal pain, diarrhea and nausea.   Musculoskeletal:  Negative for arthralgias.   Neurological:  Negative for headaches.   Psychiatric/Behavioral:  Positive for dysphoric mood. Negative for sleep disturbance. The patient is nervous/anxious.      Objective:      /76 (BP Location: Left arm, Patient Position: Sitting, BP Method: Medium (Manual))   Temp 98.1 °F (36.7 °C) (Oral)   Ht 5' 10" (1.778 m)   Wt 81.4 kg (179 lb 5.5 oz)   SpO2 97%   BMI 25.73 kg/m²      Physical Exam  Constitutional:       Appearance: Normal appearance.   HENT:      Head: Normocephalic.   Neck:      Thyroid: No thyromegaly.      Vascular: No carotid bruit. "   Cardiovascular:      Rate and Rhythm: Normal rate and regular rhythm.      Pulses: Normal pulses.      Heart sounds: Normal heart sounds.   Pulmonary:      Effort: Pulmonary effort is normal.      Breath sounds: Normal breath sounds.   Abdominal:      General: Bowel sounds are normal.      Tenderness: There is no abdominal tenderness.   Musculoskeletal:      Cervical back: Normal range of motion.      Right lower leg: No edema.      Left lower leg: No edema.      Comments: Gait normal   Skin:     General: Skin is warm and dry.      Capillary Refill: Capillary refill takes less than 2 seconds.   Neurological:      General: No focal deficit present.      Mental Status: He is alert.   Psychiatric:         Attention and Perception: Attention and perception normal.         Mood and Affect: Mood and affect normal.         Speech: Speech normal.         Behavior: Behavior normal.       Assessment:       1. Diabetes mellitus type 2 with complications        Plan:       Diabetes mellitus type 2 with complications: follow up in one week. Monitor glucose readings. Schedule with Endocrinology  -     Ambulatory referral/consult to Endocrinology; Future; Expected date: 05/26/2023       Continue current medication  Take medications only as prescribed  Healthy diet, exercise  Adequate rest  Adequate hydration  Avoid allergens  Avoid excessive caffeine      Follow up one week

## 2023-06-20 NOTE — TELEPHONE ENCOUNTER
"Called patient to reschedule a follow up on October 30th, 2023. Patient states, " Don't worry about it, I ain't coming back."    Asked patient if that meant he is no longer needing  services. Response was "right"  [place]   "

## 2023-06-26 NOTE — PROGRESS NOTES
2023 Care Everywhere updates requested and reviewed.  Immunizations reconciled. Media reports reviewed.  Duplicate HM overrides and  orders removed.  Overdue HM topic chart audit and/or requested.  Overdue lab testing linked to upcoming lab appointments if applies.    Future Appointments   Date Time Provider Department Center   2023 10:00 AM Viviana Blake RD, CDE [facility] DIABETE [place]   7/10/2023 10:30 AM Neeru Bolivar, IGNACIO [facility] FAM MED [place]   [facility]   2023  2:00 PM Marizol Flores, DANI, NP [facility] ENDO [place]       Health Maintenance Due   Topic Date Due    TETANUS VACCINE  Never done    Shingles Vaccine (1 of 2) Never done    COVID-19 Vaccine (5 - Pfizer series) 2023

## 2023-07-06 NOTE — PROGRESS NOTES
Diabetes Care Specialist Progress Note  Author: Viviana Blake RD, CDE  Date: 7/6/2023    Program Intake  Reason for Diabetes Program Visit:: Intervention  Type of Intervention:: Individual  Individual: Education  Education: Pattern Management  Current diabetes risk level:: high  In the last 12 months, have you:: none    Lab Results   Component Value Date    HGBA1C >14.0 (H) 05/08/2023     Clinical    Problem Review  Reviewed Problem List with Patient: yes  Active comorbidities affecting diabetes self-care.: yes  Comorbidities: Hypertension  Reviewed health maintenance: yes    Clinical Assessment  Current Diabetes Treatment: Oral Medication, Diet (Jardiance 10 mg, Tradjenta 5 mg (previously on glyxambi but due to high insurance cost of combo medication, now taking separate))  Have you ever experienced hypoglycemia (low blood sugar)?: no  Have you ever experienced hyperglycemia (high blood sugar)?: no    Medication Information  How do you obtain your medications?: Patient drives  How many days a week do you miss your medications?: Never  Do you sometimes have difficulty refilling your medications?: No  Medication adherence impacting ability to self-manage diabetes?: No    Labs  Do you have regular lab work to monitor your medications?: Yes  Type of Regular Lab Work: A1c, Cholesterol  [facility]  Lab Compliance Barriers: No    Additional Social History    Support  Does anyone support you with your diabetes care?: yes  Who supports you?: self, other (see comments) (ex wife assists in care, may prepare some meals)  Who takes you to your medical appointments?: self  Does the current support meet the patient's needs?: Yes  Is Support an area impacting ability to self-manage diabetes?: No    Access to Mass Media & Technology  Does the patient have access to any of the following devices or technologies?: Smart phone  Media or technology needs impacting ability to self-manage diabetes?:  No    Cognitive/Behavioral Health  Alert and Oriented: Yes  Difficulty Thinking: No  Requires Prompting: No  Requires assistance for routine expression?: No  Cognitive or behavioral barriers impacting ability to self-manage diabetes?: No    Culture/Yarsanism  Culture or Restorationism beliefs that may impact ability to access healthcare: No    Communication  Language preference: English  Hearing Problems: No  Vision Problems: Yes  Vision problem type:: Decreased Vision  Vision Assistance: Glasses  Communication needs impacting ability to self-manage diabetes?: No    Health Literacy  Preferred Learning Method: Face to Face, Hands On  How often do you need to have someone help you read instructions, pamphlets, or written material from your doctor or pharmacy?: Never  Health literacy needs impacting ability to self-manage diabetes?: No    Diabetes Self-Management Skills Assessment    Physical Activity/Exercise  Patient's daily activity level:: lightly active  Patient formally exercises outside of work.: no  Reasons for not exercising:: physically unable to exercise currently (ambulates with cane for support but does well walking)  Patient can identify forms of physical activity.: yes  Stated forms of physical activity:: moving to burn calories, seated/chair exercises  Patient can identify reasons why exercise/physical activity is important in diabetes management.: yes  Identified reasons:: lowers risk of heart disease and stroke, lowers blood glucose, blood pressure, and cholesterol  Physical Activity/Exercise Skills Assessment Completed: : Yes  Assessment indicates:: Adequate understanding  Area of need?: No    Medications  Patient is able to describe current diabetes management routine.: yes  Diabetes management routine:: diet, oral medications  Patient is able to identify current diabetes medications, dosages, and appropriate timing of medications.: yes  Patient understands the purpose of the medications taken for  diabetes.: yes  Patient reports problems or concerns with current medication regimen.: no  Medication Skills Assessment Completed:: Yes  Assessment indicates:: Adequate understanding  Area of need?: No    Home Blood Glucose Monitoring  Patient states that blood sugar is checked at home daily.: no  Reasons for not monitoring:: unsure how to use equipment  Home Blood Glucose Monitoring Skills Assessment Completed: : Yes  Assessment indicates:: Instruction Needed  Area of need?: Yes    Assessment Summary and Plan    Based on today's diabetes care assessment, the following areas of need were identified:      Social 7/6/2023   Support No   Access to Mass Media/Tech No   Cognitive/Behavioral Health No   Culture/Muslim No   Communication No   Health Literacy No      Clinical 7/6/2023   Medication Adherence No   Lab Compliance No      Diabetes Self-Management Skills 7/6/2023   Physical Activity/Exercise No   Medication No   Home Blood Glucose Monitoring Yes--see care plan      Today's interventions were provided through individual discussion, instruction, and written materials were provided.      Patient verbalized understanding of instruction and written materials.  Pt was able to return back demonstration of instructions today. Patient understood key points, needs reinforcement and further instruction.     Diabetes Self-Management Care Plan:    Today's Diabetes Self-Management Care Plan was developed with Chris's input. Chris has agreed to work toward the following goal(s) to improve his/her overall diabetes control.      Care Plan: Diabetes Management   Updates made since 6/6/2023 12:00 AM     Problem: Blood Glucose Self-Monitoring         Goal: Patient agrees to check and record blood sugars 2 times per day--fasting in the morning and before dinner.  Has new glucometer at home and ex-wife will assist with use and begin tonight. Bring completed glucose logs to all visits for review.    Start Date: 5/11/2023   Expected  End Date: 6/2/2023   This Visit's Progress: Not met   Priority: High   Barriers: Knowledge deficit   Note:    7/6/23:  Patient has not been monitoring his glucose--states he has been through 2-3 glucometers at home and each one malfunctioned.  Reports ex wife tried to assist and she also was unable to obtain glucose reading with purchased glucometer. He was frustrated and threw the glucometers away.  Instructed patient he could have brought meters to pharmacy where purchased or contacted diabetes care specialist for glucometer training.      Sample glucometer provided today at visit (Accu Chek Guide) and able to check glucose without issue--glucose was 214 mg/dL 1-hour post prandial (all protein breakfast of brisket).  Educated patient on use of glucometer and he was also provided with glucose log to check glucose 2 times daily (fasting and before dinner) to bring to upcoming PCP visit on 7/10/23 for review. Patient verbalizes understanding about how to use sample glucometer provided.  Unable to determine patient's current diabetes control without any glucose readings. Patient also set to establish with Endocrine on 7/26/23 and would benefit from having glucose readings to bring to that visit to determine if current DM medication regimen appropriate.        Problem: Healthy Eating         Goal: Eliminate simple carbohdrates (fruit juice, smoothies).  Begin incorporating protein source at all meals and snacks. Reviewed plate method (balanced eating). Completed 7/6/2023   Start Date: 5/11/2023   Expected End Date: 6/2/2023   This Visit's Progress: Met   Priority: Medium   Barriers: Knowledge deficit   Note:    7/6/23:  Patient states he has eliminated sweetened beverages (juices, smoothies). He is trying to choose balanced meals by including protein source + carb at each meal/snack.  Additional weight loss of 5-6 lbs over past 6-8 weeks, patient states weight loss unintentional.        Problem: Disease Process          Goal: Patient agrees to take steps toward understanding the diabetes disease process and treatment options by initiating DM medications and improving diet to reduce carbohydrate intake. Completed 7/6/2023   Start Date: 5/11/2023   Expected End Date: 6/2/2023   This Visit's Progress: Met   Priority: Low   Barriers: No Barriers Identified     Follow Up Plan     Follow up if symptoms worsen or fail to improve, for continued DSMES--patient educated on glucometer use today (sample glucometer provided at visit as patient has thrown away home glucometer after getting frustrated with inability to use) so that he can initiate home monitoring prior to upcoming 7/10/23 PCP follow up and subsequent new endocrinology visit on 7/26/23.  Glucose logs provided. Unable to determine whether patient's current DM medications are appropriate or if diabetes control is improving or worsening without home glucose readings. Next Hgb A1c due after 8/8/23--glucose levels of 214 mg/dL today taken with sample glucometer 1 hr after all protein breakfast  indicative of improvement in glucose levels but not yet at goal.  Did review basics of balanced eating and patient trying to incorporate lean protein at meals/snacks. He has eliminated fruit juice and simple carbs from diet.      Today's care plan and follow up schedule was discussed with patient.  Chris verbalized understanding of the care plan, goals, and agrees to follow up plan.        The patient was encouraged to communicate with his/her health care provider/physician and care team regarding his/her condition(s) and treatment.  I provided the patient with my contact information today and encouraged to contact me via phone or [facility] Patient Portal as needed.     Length of Visit   Total Time: 45 Minutes

## 2023-07-10 NOTE — PROGRESS NOTES
Subjective:       Patient ID: Chris Quiroz is a 72 y.o. male.    Chief Complaint: Follow-up    HPI here for follow up. Glucose levels are high due to high doses of daily steroids. He is still on 70 mg prednisone daily. States he has had trouble getting a follow up with Pulmonology. Readings at home from . He is having trouble using the glucometer due to his coordination issues.  States he cannot seem to get an accurate reading. We  discussed the need for TID glucose checks with sliding scale coverage.  I am concerned that he has been on the high dose steroids now for two months. He states he has not been able to wean off without getting extremely sick. 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 He feels his coordination is getting worse. States he is extremely weak. Has to use a cane now for balance. He is getting more confused. States he cannot concentrate anymore. Dizzy more often. He states he has sent multiple messages to Pulmonology asking about his life expectancy but has not received an answer. I will call Pulmonology and see how soon someone can see him.  ( Able to schedule him for tomorrow morning with NP in their clinic).     See ROS    The following portion of the patients history was reviewed and updated as appropriate: allergies, current medications, past medical and surgical history. Past social history and problem list reviewed. Family PMH and Past social history reviewed. Tobacco, Illicit drug use reviewed.      Review of patient's allergies indicates:   Allergen Reactions    Juniper Shortness Of Breath     States lungs fill up with fluid    Nsaids (non-steroidal anti-inflammatory drug) Other (See Comments)     Gastric ulcers    Cat dander          Current Outpatient Medications:     acetaminophen (TYLENOL) 325 MG tablet, Take 2 tablets (650 mg total) by mouth every 6 (six) hours as needed for Pain., Disp: 60 tablet, Rfl: 0    albuterol-ipratropium (DUO-NEB)  2.5 mg-0.5 mg/3 mL nebulizer solution, TAKE 3 ML BY NEBULIZATION EVERY 6 HOURS AS NEEDED FOR WHEEZE RESCUE Strength: 0.5 MG-3 MG(2.5 mg base)/3 mL, Disp: 360 mL, Rfl: 3    azelastine (ASTELIN) 137 mcg (0.1 %) nasal spray, 1 spray (137 mcg total) by Nasal route 2 (two) times daily., Disp: 30 mL, Rfl: 11    blood sugar diagnostic Strp, For twice daily glucose checks.  Strips and lancets, Disp: 60 strip, Rfl: 6    blood-glucose meter (BLOOD GLUCOSE MONITORING) kit, Use as instructed for BID glucose checks, Disp: 1 each, Rfl: 0    diphenhydrAMINE (BENADRYL) 25 mg capsule, Take 25 mg by mouth 2 (two) times daily as needed., Disp: , Rfl:     empagliflozin (JARDIANCE) 10 mg tablet, Take 1 tablet (10 mg total) by mouth once daily., Disp: 90 tablet, Rfl: 2    esomeprazole (NEXIUM) 40 MG capsule, Take 1 capsule by mouth once daily., Disp: , Rfl:     fluticasone propionate (FLONASE) 50 mcg/actuation nasal spray, 1 spray (50 mcg total) by Each Nostril route once daily., Disp: 18.2 mL, Rfl: 3    glucosamine-chondroitin 500-400 mg tablet, Take 1 tablet by mouth as needed. , Disp: , Rfl:     linaGLIPtin (TRADJENTA) 5 mg Tab tablet, Take 1 tablet (5 mg total) by mouth once daily., Disp: 90 tablet, Rfl: 2    melatonin 5 mg Tab, Take 2 mg by mouth nightly. , Disp: , Rfl:     mepolizumab (NUCALA) 100 mg/mL Syrg, Inject 3 mLs (300 mg total) into the skin every 28 days., Disp: 300 mL, Rfl: 0    montelukast (SINGULAIR) 10 mg tablet, Take 1 tablet (10 mg total) by mouth every evening., Disp: 30 tablet, Rfl: 6    mupirocin (BACTROBAN) 2 % ointment, Apply topically 2 (two) times daily., Disp: 30 g, Rfl: 2    predniSONE (DELTASONE) 20 MG tablet, Take 3.5 tablets (70 mg total) by mouth once daily., Disp: 105 tablet, Rfl: 2    rosuvastatin (CRESTOR) 10 MG tablet, Take 1 tablet (10 mg total) by mouth once daily., Disp: 90 tablet, Rfl: 3    sildenafiL (VIAGRA) 50 MG tablet, Take 50 mg by mouth daily as needed., Disp: , Rfl:      sulfamethoxazole-trimethoprim 800-160mg (BACTRIM DS) 800-160 mg Tab, Take 1 tablet by mouth every Mon, Wed, Fri., Disp: 90 tablet, Rfl: 1    vortioxetine (TRINTELLIX) 10 mg Tab, Take 1 tablet (10 mg total) by mouth once daily., Disp: 90 tablet, Rfl: 3    Current Facility-Administered Medications:     mepolizumab (NUCALA) injection 100 mg, 100 mg, Subcutaneous, Q28 Days, Joesph Fofana MD, 100 mg at 06/19/23 1429    mepolizumab (NUCALA) injection 100 mg, 100 mg, Subcutaneous, Q28 Days, Joesph Fofana MD, 100 mg at 06/19/23 1428    mepolizumab (NUCALA) injection 100 mg, 100 mg, Subcutaneous, Q28 Days, Joesph Fofana MD, 100 mg at 06/19/23 1427    Past Medical History:   Diagnosis Date    Anxiety     Anxiety     Atherosclerosis of coronary artery     per CT scan dated 7/13/18    Depression     Diverticulosis of intestine without bleeding     GERD (gastroesophageal reflux disease)     HTN (hypertension)     Hyperlipidemia     Hypertension     Plantar fasciitis     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 sent for scanning    Vitamin D deficiency     Vitamin D deficiency        Past Surgical History:   Procedure Laterality Date    COLONOSCOPY      ESOPHAGOGASTRODUODENOSCOPY Left 12/7/2018    Procedure: EGD (ESOPHAGOGASTRODUODENOSCOPY);  Surgeon: Josiah Cuadra MD;  Location: [facility];  Service: Endoscopy;  Laterality: Left;    ESOPHAGOGASTRODUODENOSCOPY N/A 2/28/2021    Procedure: EGD (ESOPHAGOGASTRODUODENOSCOPY);  Surgeon: James Martines Jr., MD;  Location: [facility];  Service: Endoscopy;  Laterality: N/A;    Right Hand Surgery      TONSILLECTOMY      UMBILICAL HERNIA REPAIR N/A 5/19/2021    Procedure: REPAIR, HERNIA, UMBILICAL WITH MESH;  Surgeon: Hardik Ruiz MD;  Location: [facility];  Service: General;  Laterality: N/A;       Social History     Socioeconomic History    Marital status:    Tobacco Use    Smoking status: Former      "Packs/day: 2.00     Types: Cigarettes    Smokeless tobacco: Never    Tobacco comments:     Age Started: 22 Age Quit: 51   Substance and Sexual Activity    Alcohol use: Not Currently     Comment: once in a while    Drug use: No     Comment: Tried  CBD     Sexual activity: Never     Review of Systems   Constitutional:  Positive for activity change and fatigue. Negative for fever.   HENT: Negative.     Respiratory:  Positive for shortness of breath and wheezing. Negative for cough and chest tightness.    Cardiovascular:  Negative for chest pain, palpitations and leg swelling.   Gastrointestinal:  Negative for abdominal pain, diarrhea, nausea and vomiting.   Genitourinary: Negative.    Musculoskeletal:  Positive for arthralgias, gait problem and myalgias.   Skin:         Skin abrasion to arms. Bruising easily   Neurological:  Positive for weakness and light-headedness. Negative for headaches.   Hematological:  Bruises/bleeds easily.   Psychiatric/Behavioral:  Positive for decreased concentration. Negative for dysphoric mood and sleep disturbance. The patient is not nervous/anxious.      Objective:      /70   Pulse (!) 111   Temp 97.8 °F (36.6 °C) (Oral)   Resp 20   Ht 5' 10" (1.778 m)   Wt 83.3 kg (183 lb 12.1 oz)   SpO2 97%   BMI 26.37 kg/m²      Physical Exam  Constitutional:       Appearance: Normal appearance. He is normal weight.   HENT:      Head: Normocephalic.   Eyes:      Pupils: Pupils are equal, round, and reactive to light.   Neck:      Thyroid: No thyromegaly.      Vascular: No carotid bruit.   Cardiovascular:      Rate and Rhythm: Normal rate and regular rhythm.      Pulses: Normal pulses.      Heart sounds: Normal heart sounds. No murmur heard.  Pulmonary:      Effort: Pulmonary effort is normal.      Breath sounds: Wheezing (mild end exp wheeze) present.   Abdominal:      General: Bowel sounds are normal.      Tenderness: There is no abdominal tenderness.   Musculoskeletal:      Cervical " back: Normal range of motion.      Right lower leg: No edema.      Left lower leg: No edema.      Comments: Gait is slow, steady with use of a cane for support.   Skin:     General: Skin is warm and dry.      Capillary Refill: Capillary refill takes less than 2 seconds.      Coloration: Skin is pale.      Findings: Bruising present.      Comments: Abrasion to top of left wrist. healing   Neurological:      General: No focal deficit present.      Mental Status: He is alert.   Psychiatric:         Attention and Perception: Attention and perception normal.         Mood and Affect: Mood and affect normal.         Speech: Speech is delayed.         Behavior: Behavior is slowed.       Assessment:       1. Diabetes mellitus type 2 with complications    2. Cognitive changes    3. Chronic obstructive pulmonary disease, unspecified COPD type    4. Mixed hyperlipidemia    5. Primary hypertension    6. Eosinophilic granulomatosis with polyangiitis (EGPA)    7. Gait instability    8. Skin abrasion        Plan:       Diabetes mellitus type 2 with complications: he needs to continuous glucose monitor due to his ability to accurately use glucometer. He has appointment with Endocrinology on 7/24. He will probably need sliding scale with insulin. Will see what HgbA1c shows and if we can get james monitor approved. He needs to get off the high dose steroids.  -      Hemoglobin A1C  -     Comprehensive Metabolic Panel  -     flash glucose scanning reader (FREESTYLE JAMES 14 DAY READER) Misc; 1 Device by Misc.(Non-Drug; Combo Route) route continuous.  Dispense: 1 each; Refill: 0  -     flash glucose sensor (FREESTYLE JAMES 14 DAY SENSOR) Kit; 1 Device by Misc.(Non-Drug; Combo Route) route every 14 (fourteen) days.  Dispense: 2 kit; Refill: 6    Cognitive changes: seems to be declining more since last visit.     Chronic obstructive pulmonary disease, unspecified COPD type: using inhaler. Has follow up with Pulmonology tomorrow    Mixed  hyperlipidemia: not tolerating crestor.     Lab Results   Component Value Date    LDLCALC 58.6 (L) 11/16/2022        Primary hypertension: good BP control.     Eosinophilic granulomatosis with polyangiitis (EGPA): has follow up with Pulmonology tomorrow    Gait instability: use cane for support. May need PT.     Skin abrasion: keep clean, dry. Healing well.      Continue current medication  Take medications only as prescribed  Healthy diet, exercise  Adequate rest  Adequate hydration  Avoid allergens  Avoid excessive caffeine      Follow up 2 months, sooner if needed.

## 2023-07-11 PROBLEM — J84.9 ILD (INTERSTITIAL LUNG DISEASE): Status: ACTIVE | Noted: 2023-01-01

## 2023-07-11 PROBLEM — J47.9 TRACTION BRONCHIECTASIS: Status: ACTIVE | Noted: 2023-01-01

## 2023-07-11 PROBLEM — J84.10 PULMONARY FIBROSIS: Status: ACTIVE | Noted: 2023-01-01

## 2023-07-11 PROBLEM — I51.89 DIASTOLIC DYSFUNCTION: Status: ACTIVE | Noted: 2023-01-01

## 2023-07-11 NOTE — PROGRESS NOTES
CC: This 72 y.o. male presents for management of diabetes and chronic conditions pending review including HTN, HLP, CAD    HPI: He was recently diagnosed with T2DM vis steroid induced. Has never been hospitalized r/t DM.  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 and has been receiving high dose steroids for past 2 months bg   Difficulty checking bg as he is weak and having issues with coordination and dexterity    He did see pulmonary yesterday and is to be slowly weaned down on steroids every 14-21 days and transitioned to cellcept  He has met with education and what he took from that visit is that he should avoid all carbs and meat  So he mostly has not been eating with exception of nuts and raisins   DM edu note reviewed and what discussed was eliminating empty carbohydrates like juice  He did break down and eat oatmeal this am  monitoring BG at home: 94 x 1- 200-300s  Bg is office 90 mins pp oatmeal is 277  Exercise: none  He is taking his medications regularly     CURRENT DM MEDS: tradjenta 5 mg qd, jardiance 10 mg qd  Patient is a writer    ROS:   Gen: appears tired  Eyes: + visual disturbances  Resp: + SOB     Cardiac: No palpitations, chest pain,    GI: No nausea or vomiting, diarrhea, constipation  /GYN: 2+ nocturia, burning or pain.   MS/Neuro: Denies numbness/ tingling in BLE; walks w a cane, speech clear  Psych: Denies drug/ETOH abuse, +depression.  Other systems: negative.    PE:  GENERAL: Well developed,    PSYCH: AAOx3,  conversant,    EYES: Conjunctiva, corneas clear  NECK: Supple, trachea midline   ABDOMEN: Soft, non-tender, non-distended   NEURO: walks w cane  SKIN:  no acanthosis nigracans. Abrasions noted to bilateral arms, no drainage- pink      Personally reviewed Past Medical, Surgical, Social History.    /66 (BP Location: Left arm, Patient Position: Sitting, BP Method: Large (Manual))    "Pulse 108   Ht 5' 10" (1.778 m)   Wt 79.9 kg (176 lb 4.1 oz)   BMI 25.29 kg/m²      Personally reviewed the below labs:      Chemistry        Component Value Date/Time     07/10/2023 1123    K 3.9 07/10/2023 1123     07/10/2023 1123    CO2 26 07/10/2023 1123    BUN 23 07/10/2023 1123    CREATININE 0.8 07/10/2023 1123     (H) 07/10/2023 1123        Component Value Date/Time    CALCIUM 8.9 07/10/2023 1123    ALKPHOS 63 07/10/2023 1123    AST 15 07/10/2023 1123    ALT 26 07/10/2023 1123    BILITOT 1.2 (H) 07/10/2023 1123    ESTGFRAFRICA >60.0 05/04/2022 1001    EGFRNONAA >60.0 05/04/2022 1001            Lab Results   Component Value Date    TSH 1.418 02/19/2020       Recent Labs   Lab 11/16/22  0725   LDL Cholesterol 58.6 L    H   Cholesterol 172        No results found for this or any previous visit.  No results found for this or any previous visit.    No results found for: MICALBCREAT    Hemoglobin A1C   Date Value Ref Range Status   07/10/2023 9.8 (H) 4.0 - 5.6 % Final     Comment:     ADA Screening Guidelines:  5.7-6.4%  Consistent with prediabetes  >or=6.5%  Consistent with diabetes    High levels of fetal hemoglobin interfere with the HbA1C  assay. Heterozygous hemoglobin variants (HbS, HgC, etc)do  not significantly interfere with this assay.   However, presence of multiple variants may affect accuracy.     05/08/2023 >14.0 (H) 4.0 - 5.6 % Final     Comment:     ADA Screening Guidelines:  5.7-6.4%  Consistent with prediabetes  >or=6.5%  Consistent with diabetes    High levels of fetal hemoglobin interfere with the HbA1C  assay. Heterozygous hemoglobin variants (HbS, HgC, etc)do  not significantly interfere with this assay.   However, presence of multiple variants may affect accuracy.     05/11/2021 6.2 (H) 4.0 - 5.6 % Final     Comment:     ADA Screening Guidelines:  5.7-6.4%  Consistent with prediabetes  >or=6.5%  Consistent with diabetes    High levels of fetal hemoglobin interfere " with the HbA1C  assay. Heterozygous hemoglobin variants (HbS, HgC, etc)do  not significantly interfere with this assay.   However, presence of multiple variants may affect accuracy.          ASSESSMENT and PLAN:      1. T2DM with hyperglycemia- likely steroid induced, did have pre-diabetes noted back in 2021 via lab  Patient is currently with a roller coaster of emotions- sometimes refusing to do any care regarding diabetes as it's illiciting so much stress and restrictions  Reviewed dm edu not w patient- we would only like him to avoid juice  He can eat a normal diet including carbohydrates  He can have 15-45 cho with meals  He should not eliminate CHO w meals as he is taking jardiance and this may induce euglycemic DKA    He was prescribed [place]  sensor by PCP- he will cash pay for this today in order to start monitoring his bg  He would much rather have a device where bg are automatically pushed so I will send in for Dexcom g7- this should be covered under ins- but will likely take 2-3 w to receive as will be shipped to him and needs to go through DME side of his ins  Demonstration and administration of insulin pen done and demo returned by patient in office  He is to start Novolog SSI /25  Only take Novolog 5-15 mins prior to a meal, rotate injection sites  His ex-wife is present with him at today's visit  They are amendable to today's treatment plan    2. HTN - controlled, continue meds as previously prescribed and monitor.     3. HLP -  on statin therapy     4. Eosinophilic granulomatosis with polyangiitis- will start steroid taper down as prescribed yesterday,  needs to  Rxs from pharmacy, following w pulmonology        Follow-up: in 4 weeks with lab prior

## 2023-07-12 PROBLEM — E11.65 TYPE 2 DIABETES MELLITUS WITH HYPERGLYCEMIA, WITHOUT LONG-TERM CURRENT USE OF INSULIN: Status: ACTIVE | Noted: 2023-01-01

## 2023-07-12 PROBLEM — E11.8 DIABETES MELLITUS TYPE 2 WITH COMPLICATIONS: Status: RESOLVED | Noted: 2023-01-01 | Resolved: 2023-01-01

## 2023-08-09 NOTE — TELEPHONE ENCOUNTER
Bg log received  I'm satisfied w these readings  Looks like steroids are coming down every 2 weeks so bg should only continue to get better

## 2023-08-10 NOTE — PROGRESS NOTES
Subjective:       Patient ID: Chris Quiroz is a 72 y.o. male.    Chief Complaint: Follow-up    HPI here for follow up. He actually looks much better today than he has in months. He appears stronger, less SOB. He states he is feeling a lot better since being weaned off the steroids. He was given prescription for Cellcept but he states he is not going to take that. States he read the potential side effects and refuses to take it. States he is also not going to take the Nucala injections anymore because they make him feel so bad. He has requested a follow up appointment at [facility] for second opinion. He has follow up scheduled with Pulmonology.    He saw Endocrinology for his steroid induced diabetes. He got the DexCom monitor so is able to monitor his glucose levels easier. He was started on sliding scale insulin but he did not get this filled. States he refuses to use insulin because insulin killed his mother and sister. When I asked if he told the Endocrinology provider he was not going to take it he said NO that she would have just laughed at him.  Advised that he has to be honest with his providers in order for us to effectively care for him.    He is not taking the crestor for his hyperlipidemia, atherosclerosis. States it causes muscle weakness and joint pain.     He is still having issues with his gait and lower extremity weakness. Cannot walk long distances due to SOB, lower extremity weakness. He would benefit from standard wheelchair in order to complete daily MRADLS.  The patient's limitations cannot be solved by using a cane or walker. The patient's home provides adequate access and a wheelchair will improve his ability to participate in MRADLS.     See ROS    The following portion of the patients history was reviewed and updated as appropriate: allergies, current medications, past medical and surgical history. Past social history and problem list reviewed. Family PMH and Past social history  reviewed. Tobacco, Illicit drug use reviewed.      Review of patient's allergies indicates:   Allergen Reactions    Juniper Shortness Of Breath     States lungs fill up with fluid    Nsaids (non-steroidal anti-inflammatory drug) Other (See Comments)     Gastric ulcers    Cat dander          Current Outpatient Medications:     acetaminophen (TYLENOL) 325 MG tablet, Take 2 tablets (650 mg total) by mouth every 6 (six) hours as needed for Pain., Disp: 60 tablet, Rfl: 0    albuterol-ipratropium (DUO-NEB) 2.5 mg-0.5 mg/3 mL nebulizer solution, TAKE 3 ML BY NEBULIZATION EVERY 6 HOURS AS NEEDED FOR WHEEZE RESCUE Strength: 0.5 MG-3 MG(2.5 mg base)/3 mL, Disp: 360 mL, Rfl: 3    blood sugar diagnostic Strp, For twice daily glucose checks.  Strips and lancets, Disp: 60 strip, Rfl: 6    blood-glucose meter (BLOOD GLUCOSE MONITORING) kit, Use as instructed for BID glucose checks, Disp: 1 each, Rfl: 0    blood-glucose meter,continuous (DEXCOM G7 ) Misc, Dispense 1 to monitor blood glucose., Disp: 1 each, Rfl: 0    blood-glucose sensor (DEXCOM G7 SENSOR) Christi, Change every 10 days, Disp: 9 each, Rfl: 4    diphenhydrAMINE (BENADRYL) 25 mg capsule, Take 25 mg by mouth 2 (two) times daily as needed., Disp: , Rfl:     empagliflozin (JARDIANCE) 10 mg tablet, Take 1 tablet (10 mg total) by mouth once daily., Disp: 90 tablet, Rfl: 2    esomeprazole (NEXIUM) 40 MG capsule, Take 1 capsule by mouth once daily., Disp: , Rfl:     flash glucose scanning reader (FREESTYLE JAMES 14 DAY READER) Misc, 1 Device by Misc.(Non-Drug; Combo Route) route continuous., Disp: 1 each, Rfl: 0    flash glucose sensor (FREESTYLE JAMES 14 DAY SENSOR) Kit, 1 Device by Misc.(Non-Drug; Combo Route) route every 14 (fourteen) days., Disp: 2 kit, Rfl: 6    fluticasone propionate (FLONASE) 50 mcg/actuation nasal spray, 1 spray (50 mcg total) by Each Nostril route once daily., Disp: 18.2 mL, Rfl: 3    linaGLIPtin (TRADJENTA) 5 mg Tab tablet, Take 1 tablet (5 mg  "total) by mouth once daily., Disp: 90 tablet, Rfl: 2    mupirocin (BACTROBAN) 2 % ointment, Apply topically 2 (two) times daily., Disp: 30 g, Rfl: 2    pen needle, diabetic (BD EDMUNDO 2ND GEN PEN NEEDLE) 32 gauge x 5/32" Ndle, Uses 3 daily, Disp: 150 each, Rfl: 6    predniSONE (DELTASONE) 10 MG tablet, Take 6.5 tablets (65 mg total) by mouth once daily for 14 days, THEN 6 tablets (60 mg total) once daily for 14 days, THEN 5.5 tablets (55 mg total) once daily for 14 days, THEN 5 tablets (50 mg total) once daily for 14 days, THEN 4.5 tablets (45 mg total) once daily for 14 days, THEN 4 tablets (40 mg total) once daily., Disp: 505 tablet, Rfl: 0    sildenafiL (VIAGRA) 50 MG tablet, Take 50 mg by mouth daily as needed., Disp: , Rfl:     vortioxetine (TRINTELLIX) 10 mg Tab, Take 1 tablet (10 mg total) by mouth once daily., Disp: 90 tablet, Rfl: 3    glucosamine-chondroitin 500-400 mg tablet, Take 1 tablet by mouth as needed. , Disp: , Rfl:   No current facility-administered medications for this visit.    Past Medical History:   Diagnosis Date    Anxiety     Anxiety     Atherosclerosis of coronary artery     per CT scan dated 7/13/18    Depression     Diverticulosis of intestine without bleeding     GERD (gastroesophageal reflux disease)     HTN (hypertension)     Hyperlipidemia     Hypertension     Plantar fasciitis     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 sent for scanning    Vitamin D deficiency     Vitamin D deficiency        Past Surgical History:   Procedure Laterality Date    COLONOSCOPY      ESOPHAGOGASTRODUODENOSCOPY Left 12/7/2018    Procedure: EGD (ESOPHAGOGASTRODUODENOSCOPY);  Surgeon: Josiah Cuadra MD;  Location: [facility];  Service: Endoscopy;  Laterality: Left;    ESOPHAGOGASTRODUODENOSCOPY N/A 2/28/2021    Procedure: EGD (ESOPHAGOGASTRODUODENOSCOPY);  Surgeon: James Martines Jr., MD;  Location: [facility];  Service: Endoscopy;  " "Laterality: N/A;    Right Hand Surgery      TONSILLECTOMY      UMBILICAL HERNIA REPAIR N/A 2021    Procedure: REPAIR, HERNIA, UMBILICAL WITH MESH;  Surgeon: Hardik Ruiz MD;  Location: [facility] OR;  Service: General;  Laterality: N/A;       Social History     Socioeconomic History    Marital status:    Tobacco Use    Smoking status: Former     Current packs/day: 0.00     Average packs/day: 2.0 packs/day for 50.0 years (100.0 ttl pk-yrs)     Types: Cigarettes     Start date: 1971     Quit date: 2021     Years since quittin.6    Smokeless tobacco: Never    Tobacco comments:     Age Started: 22 Age Quit: 51   Substance and Sexual Activity    Alcohol use: Not Currently     Comment: once in a while    Drug use: No     Comment: Tried  CBD     Sexual activity: Never     Review of Systems   Constitutional:  Positive for fatigue (improving). Negative for fever.        Activity level has improved. Feeling stronger.    Respiratory:  Positive for shortness of breath (improving). Negative for cough, chest tightness and wheezing.    Cardiovascular:  Positive for leg swelling (comes and goes. improves with elevating his legs). Negative for chest pain and palpitations.   Gastrointestinal:  Negative for abdominal pain, diarrhea, nausea and vomiting.   Genitourinary: Negative.    Musculoskeletal:  Positive for arthralgias, gait problem (lower extremity weakness, feels unstable) and myalgias.   Skin:         Bruising to arms has improved   Neurological:  Positive for weakness (greatly improved. feeling stronger). Negative for headaches.   Psychiatric/Behavioral:  Negative for dysphoric mood and sleep disturbance. The patient is not nervous/anxious.        Objective:      /68   Pulse 88   Temp 97.9 °F (36.6 °C)   Resp 16   Ht 5' 10" (1.778 m)   Wt 83.3 kg (183 lb 12.1 oz)   SpO2 97%   BMI 26.37 kg/m²      Physical Exam  Constitutional:       Appearance: Normal appearance. He is normal weight.      " Comments: He looks SO much better. More alert, stronger.    HENT:      Head: Normocephalic.   Eyes:      Pupils: Pupils are equal, round, and reactive to light.   Neck:      Thyroid: No thyromegaly.      Vascular: No carotid bruit.   Cardiovascular:      Rate and Rhythm: Normal rate and regular rhythm.      Pulses: Normal pulses.      Heart sounds: Normal heart sounds. No murmur heard.  Pulmonary:      Effort: Pulmonary effort is normal.      Breath sounds: No decreased air movement. Wheezing (very mild faint lower extremity wheeze) present. No decreased breath sounds.   Abdominal:      General: Bowel sounds are normal.      Tenderness: There is no abdominal tenderness.   Musculoskeletal:      Cervical back: Normal range of motion.      Right lower leg: No edema.      Left lower leg: No edema.      Comments:  strong, equal. Gait unsteady.    Skin:     General: Skin is warm and dry.      Capillary Refill: Capillary refill takes less than 2 seconds.      Comments: Bruises to arms are greatly improved   Neurological:      General: No focal deficit present.      Mental Status: He is alert.   Psychiatric:         Attention and Perception: Attention and perception normal.         Mood and Affect: Mood and affect normal.         Speech: Speech normal.         Behavior: Behavior normal.         Assessment:       1. Type 2 diabetes mellitus with hyperglycemia, without long-term current use of insulin    2. Drug-induced immunodeficiency    3. Eosinophilic granulomatosis with polyangiitis (EGPA)    4. Moderate episode of recurrent major depressive disorder    5. Statin intolerance    6. Gait instability        Plan:       Type 2 diabetes mellitus with hyperglycemia, without long-term current use of insulin: glucose levels look better since weaning off the steroids. Not taking the insulin. Has follow up with Endo on Monday.    Drug-induced immunodeficiency: stable. Weaning off the steroids.    Eosinophilic granulomatosis  with polyangiitis (EGPA): follow up with Pulmonology. Not taking the Cellcept or Nucala.  Waiting on appointment at [facility]    Moderate episode of recurrent major depressive disorder: stable on current medication.     Statin intolerance: causes myaglia    Gait instability: will request wheelchair for use.      Continue current medication  Take medications only as prescribed  Healthy diet, exercise  Adequate rest  Adequate hydration  Avoid allergens  Avoid excessive caffeine      Follow up 3-4 weeks.

## 2023-08-11 PROBLEM — D84.821 DRUG-INDUCED IMMUNODEFICIENCY: Status: ACTIVE | Noted: 2023-01-01

## 2023-08-11 PROBLEM — Z79.899 DRUG-INDUCED IMMUNODEFICIENCY: Status: ACTIVE | Noted: 2023-01-01

## 2023-08-11 PROBLEM — Z78.9 STATIN INTOLERANCE: Status: ACTIVE | Noted: 2023-01-01

## 2023-10-05 PROBLEM — J96.01 ACUTE RESPIRATORY FAILURE WITH HYPOXIA: Status: ACTIVE | Noted: 2023-01-01

## 2023-10-05 PROBLEM — R79.89 ELEVATED TROPONIN: Status: ACTIVE | Noted: 2023-01-01

## 2023-10-05 PROBLEM — Z71.89 ACP (ADVANCE CARE PLANNING): Status: ACTIVE | Noted: 2023-01-01

## 2023-10-05 PROBLEM — R65.10 SIRS (SYSTEMIC INFLAMMATORY RESPONSE SYNDROME): Status: ACTIVE | Noted: 2023-01-01

## 2023-10-05 PROBLEM — E11.9 TYPE 2 DIABETES MELLITUS: Status: ACTIVE | Noted: 2023-01-01

## 2023-10-05 PROBLEM — I50.33 ACUTE ON CHRONIC DIASTOLIC CHF (CONGESTIVE HEART FAILURE): Status: ACTIVE | Noted: 2023-01-01

## 2023-10-05 PROBLEM — J44.89 ASTHMA WITH COPD: Status: ACTIVE | Noted: 2021-02-27

## 2023-10-05 PROBLEM — Z20.822 SUSPECTED COVID-19 VIRUS INFECTION: Status: ACTIVE | Noted: 2023-01-01

## 2023-10-05 PROBLEM — I27.20 PULMONARY HYPERTENSION: Status: ACTIVE | Noted: 2023-01-01

## 2023-10-05 NOTE — TELEPHONE ENCOUNTER
Spoke to pt.  He states he just tested positive for covid and wanted to let Neeru know he was going to [facility] now.

## 2023-10-05 NOTE — TELEPHONE ENCOUNTER
----- Message from Alex Ugalde sent at 10/5/2023 12:11 PM CDT -----  Contact: pt  Type: Needs Medical Advice  Who Called:  pt  Best Call Back Number: [phone]    Additional Information: Pt is calling the office tested positive for covid and he is already having lung issues.Please call back and advise.

## 2023-10-07 PROBLEM — R29.810 FACIAL DROOP: Status: ACTIVE | Noted: 2023-01-01

## 2023-10-07 PROBLEM — I63.9 CVA (CEREBRAL VASCULAR ACCIDENT): Status: ACTIVE | Noted: 2023-01-01

## 2023-10-07 PROBLEM — I63.512 ACUTE ISCHEMIC LEFT MCA STROKE: Status: ACTIVE | Noted: 2023-01-01

## 2023-10-11 PROBLEM — R13.19 ESOPHAGEAL DYSPHAGIA: Status: ACTIVE | Noted: 2023-01-01

## 2023-10-13 PROBLEM — G51.0 FACIAL PARALYSIS: Status: ACTIVE | Noted: 2023-01-01

## 2023-10-13 PROBLEM — G72.9 MYOPATHY: Status: ACTIVE | Noted: 2023-01-01

## 2023-10-17 PROBLEM — F32.9 MAJOR DEPRESSIVE DISORDER: Status: ACTIVE | Noted: 2023-01-01

## 2023-10-22 PROBLEM — J96.21 ACUTE ON CHRONIC RESPIRATORY FAILURE WITH HYPOXIA: Status: ACTIVE | Noted: 2023-01-01

## 2023-10-22 PROBLEM — J44.1 COPD EXACERBATION: Status: ACTIVE | Noted: 2023-01-01

## 2023-10-23 PROBLEM — J15.69 GRAM-NEGATIVE PNEUMONIA: Status: ACTIVE | Noted: 2023-01-01

## 2023-10-23 PROBLEM — Z75.8 DISCHARGE PLANNING ISSUES: Status: ACTIVE | Noted: 2023-01-01

## 2023-10-24 NOTE — TELEPHONE ENCOUNTER
I signed the order for the wheelchair. Please print and fax to [facility] [employer] [facility]

## 2023-10-25 PROBLEM — R13.10 ODYNOPHAGIA: Status: ACTIVE | Noted: 2023-01-01

## 2023-10-26 PROBLEM — B37.81 CANDIDA ESOPHAGITIS: Status: ACTIVE | Noted: 2023-01-01

## 2025-04-22 NOTE — PROGRESS NOTES
Pt received two Allergy shots, 0.2 ml of vial 1: 100 (blue) waited in clinic 30 min, no reactions Documented in XIS.                            none

## (undated) DEVICE — SUT PROLENE 0 CT-2 BL MONO

## (undated) DEVICE — SUT MONOCYRL 4-0 PS2 UND

## (undated) DEVICE — PENCIL ROCKER SWITCH 10FT CORD

## (undated) DEVICE — COTTONBALL LG ST

## (undated) DEVICE — SPONGE DERMACEA 4X4IN 12PLY

## (undated) DEVICE — SEE MEDLINE ITEM 146417

## (undated) DEVICE — DRESSING TRANS 4X4 TEGADERM

## (undated) DEVICE — APPLICATOR CHLORAPREP ORN 26ML

## (undated) DEVICE — SEE MEDLINE ITEM 152622

## (undated) DEVICE — CLOSURE SKIN STERI STRIP 1/2X4

## (undated) DEVICE — SEE L#120831

## (undated) DEVICE — GLOVE SURG BIOGEL LATEX SZ 7.5

## (undated) DEVICE — SEE MEDLINE ITEM 157148

## (undated) DEVICE — ELECTRODE BLD 1 INCH TEFLON

## (undated) DEVICE — Device

## (undated) DEVICE — ELECTRODE REM PLYHSV RETURN 9

## (undated) DEVICE — SUT 3-0 VICRYL / SH (J416)